# Patient Record
Sex: FEMALE | Race: WHITE | Employment: OTHER | URBAN - METROPOLITAN AREA
[De-identification: names, ages, dates, MRNs, and addresses within clinical notes are randomized per-mention and may not be internally consistent; named-entity substitution may affect disease eponyms.]

---

## 2020-10-04 ENCOUNTER — APPOINTMENT (OUTPATIENT)
Dept: GENERAL RADIOLOGY | Age: 85
DRG: 853 | End: 2020-10-04
Payer: MEDICARE

## 2020-10-04 ENCOUNTER — APPOINTMENT (OUTPATIENT)
Dept: CT IMAGING | Age: 85
DRG: 853 | End: 2020-10-04
Payer: MEDICARE

## 2020-10-04 ENCOUNTER — HOSPITAL ENCOUNTER (INPATIENT)
Age: 85
LOS: 11 days | Discharge: HOSPICE | DRG: 853 | End: 2020-10-15
Attending: EMERGENCY MEDICINE | Admitting: INTERNAL MEDICINE
Payer: MEDICARE

## 2020-10-04 PROBLEM — A41.9 SEPTIC SHOCK (HCC): Status: ACTIVE | Noted: 2020-10-04

## 2020-10-04 PROBLEM — D64.9 ANEMIA: Status: ACTIVE | Noted: 2020-10-04

## 2020-10-04 PROBLEM — R19.7 DIARRHEA: Status: ACTIVE | Noted: 2020-10-04

## 2020-10-04 PROBLEM — R65.21 SEPTIC SHOCK (HCC): Status: ACTIVE | Noted: 2020-10-04

## 2020-10-04 PROBLEM — E87.1 HYPONATREMIA: Status: ACTIVE | Noted: 2020-10-04

## 2020-10-04 PROBLEM — N39.0 UTI (URINARY TRACT INFECTION) WITH PYURIA: Status: ACTIVE | Noted: 2020-10-04

## 2020-10-04 LAB
ABO/RH: NORMAL
ALBUMIN SERPL-MCNC: 1.4 G/DL (ref 3.5–5.2)
ALP BLD-CCNC: 108 U/L (ref 35–104)
ALT SERPL-CCNC: 11 U/L (ref 0–32)
ANION GAP SERPL CALCULATED.3IONS-SCNC: 7 MMOL/L (ref 7–16)
ANISOCYTOSIS: ABNORMAL
ANTIBODY SCREEN: NORMAL
APTT: 25 SEC (ref 24.5–35.1)
AST SERPL-CCNC: 9 U/L (ref 0–31)
BACTERIA: ABNORMAL /HPF
BASOPHILS ABSOLUTE: 0 E9/L (ref 0–0.2)
BASOPHILS RELATIVE PERCENT: 0 % (ref 0–2)
BILIRUB SERPL-MCNC: 0.5 MG/DL (ref 0–1.2)
BILIRUBIN URINE: ABNORMAL
BLOOD, URINE: NEGATIVE
BUN BLDV-MCNC: 34 MG/DL (ref 8–23)
BURR CELLS: ABNORMAL
CALCIUM SERPL-MCNC: 7.3 MG/DL (ref 8.6–10.2)
CHLORIDE BLD-SCNC: 97 MMOL/L (ref 98–107)
CHP ED QC CHECK: NORMAL
CLARITY: ABNORMAL
CO2: 22 MMOL/L (ref 22–29)
COLOR: ABNORMAL
CREAT SERPL-MCNC: 1.3 MG/DL (ref 0.5–1)
CRYSTALS, UA: ABNORMAL /HPF
EOSINOPHILS ABSOLUTE: 0.21 E9/L (ref 0.05–0.5)
EOSINOPHILS RELATIVE PERCENT: 1 % (ref 0–6)
GFR AFRICAN AMERICAN: 47
GFR NON-AFRICAN AMERICAN: 39 ML/MIN/1.73
GLUCOSE BLD-MCNC: 81 MG/DL (ref 74–99)
GLUCOSE URINE: NEGATIVE MG/DL
HCT VFR BLD CALC: 29.8 % (ref 34–48)
HEMOCCULT STL QL: POSITIVE
HEMOGLOBIN: 10 G/DL (ref 11.5–15.5)
INR BLD: 1.4
KETONES, URINE: ABNORMAL MG/DL
LACTIC ACID, SEPSIS: 1.2 MMOL/L (ref 0.5–1.9)
LEUKOCYTE ESTERASE, URINE: ABNORMAL
LYMPHOCYTES ABSOLUTE: 4.01 E9/L (ref 1.5–4)
LYMPHOCYTES RELATIVE PERCENT: 19 % (ref 20–42)
MCH RBC QN AUTO: 28.3 PG (ref 26–35)
MCHC RBC AUTO-ENTMCNC: 33.6 % (ref 32–34.5)
MCV RBC AUTO: 84.4 FL (ref 80–99.9)
MONOCYTES ABSOLUTE: 1.27 E9/L (ref 0.1–0.95)
MONOCYTES RELATIVE PERCENT: 6 % (ref 2–12)
NEUTROPHILS ABSOLUTE: 15.61 E9/L (ref 1.8–7.3)
NEUTROPHILS RELATIVE PERCENT: 74 % (ref 43–80)
NITRITE, URINE: POSITIVE
OVALOCYTES: ABNORMAL
PDW BLD-RTO: 18 FL (ref 11.5–15)
PH UA: 5 (ref 5–9)
PLATELET # BLD: 280 E9/L (ref 130–450)
PMV BLD AUTO: 8.9 FL (ref 7–12)
POIKILOCYTES: ABNORMAL
POTASSIUM REFLEX MAGNESIUM: 4.3 MMOL/L (ref 3.5–5)
PROTEIN UA: ABNORMAL MG/DL
PROTHROMBIN TIME: 16.6 SEC (ref 9.3–12.4)
RBC # BLD: 3.53 E12/L (ref 3.5–5.5)
RBC UA: ABNORMAL /HPF (ref 0–2)
RENAL EPITHELIAL, UA: ABNORMAL /HPF
SARS-COV-2, NAAT: NOT DETECTED
SODIUM BLD-SCNC: 126 MMOL/L (ref 132–146)
SPECIFIC GRAVITY UA: 1.02 (ref 1–1.03)
TOTAL PROTEIN: 4.1 G/DL (ref 6.4–8.3)
TROPONIN: <0.01 NG/ML (ref 0–0.03)
UROBILINOGEN, URINE: 0.2 E.U./DL
WBC # BLD: 21.1 E9/L (ref 4.5–11.5)
WBC UA: >20 /HPF (ref 0–5)

## 2020-10-04 PROCEDURE — 71045 X-RAY EXAM CHEST 1 VIEW: CPT

## 2020-10-04 PROCEDURE — 87040 BLOOD CULTURE FOR BACTERIA: CPT

## 2020-10-04 PROCEDURE — 6360000004 HC RX CONTRAST MEDICATION: Performed by: RADIOLOGY

## 2020-10-04 PROCEDURE — 74177 CT ABD & PELVIS W/CONTRAST: CPT

## 2020-10-04 PROCEDURE — 6370000000 HC RX 637 (ALT 250 FOR IP): Performed by: EMERGENCY MEDICINE

## 2020-10-04 PROCEDURE — 36556 INSERT NON-TUNNEL CV CATH: CPT

## 2020-10-04 PROCEDURE — 2580000003 HC RX 258: Performed by: EMERGENCY MEDICINE

## 2020-10-04 PROCEDURE — 85025 COMPLETE CBC W/AUTO DIFF WBC: CPT

## 2020-10-04 PROCEDURE — P9016 RBC LEUKOCYTES REDUCED: HCPCS

## 2020-10-04 PROCEDURE — 83605 ASSAY OF LACTIC ACID: CPT

## 2020-10-04 PROCEDURE — U0002 COVID-19 LAB TEST NON-CDC: HCPCS

## 2020-10-04 PROCEDURE — 85730 THROMBOPLASTIN TIME PARTIAL: CPT

## 2020-10-04 PROCEDURE — 87088 URINE BACTERIA CULTURE: CPT

## 2020-10-04 PROCEDURE — 86900 BLOOD TYPING SEROLOGIC ABO: CPT

## 2020-10-04 PROCEDURE — 80053 COMPREHEN METABOLIC PANEL: CPT

## 2020-10-04 PROCEDURE — 2580000003 HC RX 258: Performed by: STUDENT IN AN ORGANIZED HEALTH CARE EDUCATION/TRAINING PROGRAM

## 2020-10-04 PROCEDURE — 86850 RBC ANTIBODY SCREEN: CPT

## 2020-10-04 PROCEDURE — 2500000003 HC RX 250 WO HCPCS: Performed by: STUDENT IN AN ORGANIZED HEALTH CARE EDUCATION/TRAINING PROGRAM

## 2020-10-04 PROCEDURE — C9113 INJ PANTOPRAZOLE SODIUM, VIA: HCPCS | Performed by: EMERGENCY MEDICINE

## 2020-10-04 PROCEDURE — 85610 PROTHROMBIN TIME: CPT

## 2020-10-04 PROCEDURE — 99223 1ST HOSP IP/OBS HIGH 75: CPT | Performed by: INTERNAL MEDICINE

## 2020-10-04 PROCEDURE — 99285 EMERGENCY DEPT VISIT HI MDM: CPT

## 2020-10-04 PROCEDURE — 86901 BLOOD TYPING SEROLOGIC RH(D): CPT

## 2020-10-04 PROCEDURE — 86920 COMPATIBILITY TEST SPIN: CPT

## 2020-10-04 PROCEDURE — 81001 URINALYSIS AUTO W/SCOPE: CPT

## 2020-10-04 PROCEDURE — 96365 THER/PROPH/DIAG IV INF INIT: CPT

## 2020-10-04 PROCEDURE — 6360000002 HC RX W HCPCS: Performed by: EMERGENCY MEDICINE

## 2020-10-04 PROCEDURE — 84484 ASSAY OF TROPONIN QUANT: CPT

## 2020-10-04 PROCEDURE — 2000000000 HC ICU R&B

## 2020-10-04 PROCEDURE — 36415 COLL VENOUS BLD VENIPUNCTURE: CPT

## 2020-10-04 RX ORDER — ONDANSETRON 4 MG/1
4 TABLET, FILM COATED ORAL EVERY 6 HOURS PRN
Status: DISCONTINUED | OUTPATIENT
Start: 2020-10-04 | End: 2020-10-07

## 2020-10-04 RX ORDER — LISINOPRIL 20 MG/1
40 TABLET ORAL DAILY
Status: DISCONTINUED | OUTPATIENT
Start: 2020-10-05 | End: 2020-10-05

## 2020-10-04 RX ORDER — PANTOPRAZOLE SODIUM 40 MG/10ML
80 INJECTION, POWDER, LYOPHILIZED, FOR SOLUTION INTRAVENOUS ONCE
Status: COMPLETED | OUTPATIENT
Start: 2020-10-04 | End: 2020-10-04

## 2020-10-04 RX ORDER — ACETAMINOPHEN 325 MG/1
650 TABLET ORAL EVERY 6 HOURS PRN
COMMUNITY

## 2020-10-04 RX ORDER — DICYCLOMINE HYDROCHLORIDE 10 MG/1
10 CAPSULE ORAL 3 TIMES DAILY
Status: DISCONTINUED | OUTPATIENT
Start: 2020-10-05 | End: 2020-10-05

## 2020-10-04 RX ORDER — SENNA PLUS 8.6 MG/1
1 TABLET ORAL DAILY PRN
Status: DISCONTINUED | OUTPATIENT
Start: 2020-10-04 | End: 2020-10-07

## 2020-10-04 RX ORDER — LISINOPRIL 40 MG/1
40 TABLET ORAL DAILY
COMMUNITY

## 2020-10-04 RX ORDER — AMITRIPTYLINE HYDROCHLORIDE 10 MG/1
10 TABLET, FILM COATED ORAL NIGHTLY
COMMUNITY

## 2020-10-04 RX ORDER — CALCIUM CARBONATE 200(500)MG
500 TABLET,CHEWABLE ORAL DAILY
Status: DISCONTINUED | OUTPATIENT
Start: 2020-10-05 | End: 2020-10-07

## 2020-10-04 RX ORDER — PHENOL 1.4 %
1 AEROSOL, SPRAY (ML) MUCOUS MEMBRANE DAILY
COMMUNITY

## 2020-10-04 RX ORDER — DICYCLOMINE HYDROCHLORIDE 10 MG/1
10 CAPSULE ORAL EVERY 6 HOURS PRN
COMMUNITY

## 2020-10-04 RX ORDER — CEFTRIAXONE 1 G/1
1 INJECTION, POWDER, FOR SOLUTION INTRAMUSCULAR; INTRAVENOUS EVERY 24 HOURS
COMMUNITY

## 2020-10-04 RX ORDER — FERROUS SULFATE 325(65) MG
325 TABLET ORAL
COMMUNITY

## 2020-10-04 RX ORDER — FERROUS SULFATE 325(65) MG
325 TABLET ORAL
Status: DISCONTINUED | OUTPATIENT
Start: 2020-10-05 | End: 2020-10-07

## 2020-10-04 RX ORDER — TAMSULOSIN HYDROCHLORIDE 0.4 MG/1
0.4 CAPSULE ORAL NIGHTLY
Status: DISCONTINUED | OUTPATIENT
Start: 2020-10-05 | End: 2020-10-05

## 2020-10-04 RX ORDER — SOTALOL HYDROCHLORIDE 120 MG/1
120 TABLET ORAL 2 TIMES DAILY
COMMUNITY

## 2020-10-04 RX ORDER — SODIUM CHLORIDE 9 MG/ML
INJECTION, SOLUTION INTRAVENOUS CONTINUOUS
Status: DISCONTINUED | OUTPATIENT
Start: 2020-10-05 | End: 2020-10-07

## 2020-10-04 RX ORDER — SODIUM CHLORIDE, SODIUM LACTATE, POTASSIUM CHLORIDE, AND CALCIUM CHLORIDE .6; .31; .03; .02 G/100ML; G/100ML; G/100ML; G/100ML
30 INJECTION, SOLUTION INTRAVENOUS ONCE
Status: COMPLETED | OUTPATIENT
Start: 2020-10-04 | End: 2020-10-04

## 2020-10-04 RX ORDER — LOPERAMIDE HYDROCHLORIDE 2 MG/1
2 CAPSULE ORAL EVERY 12 HOURS PRN
Status: DISCONTINUED | OUTPATIENT
Start: 2020-10-04 | End: 2020-10-05

## 2020-10-04 RX ORDER — ACETAMINOPHEN 325 MG/1
650 TABLET ORAL EVERY 6 HOURS PRN
Status: DISCONTINUED | OUTPATIENT
Start: 2020-10-04 | End: 2020-10-07

## 2020-10-04 RX ORDER — CEFTRIAXONE 1 G/1
1 INJECTION, POWDER, FOR SOLUTION INTRAMUSCULAR; INTRAVENOUS EVERY 24 HOURS
Status: DISCONTINUED | OUTPATIENT
Start: 2020-10-05 | End: 2020-10-04 | Stop reason: CLARIF

## 2020-10-04 RX ORDER — MESALAMINE 500 MG/1
500 CAPSULE, EXTENDED RELEASE ORAL 4 TIMES DAILY
COMMUNITY

## 2020-10-04 RX ORDER — ACETAMINOPHEN 650 MG/1
650 SUPPOSITORY RECTAL EVERY 6 HOURS PRN
Status: DISCONTINUED | OUTPATIENT
Start: 2020-10-04 | End: 2020-10-07

## 2020-10-04 RX ORDER — SODIUM CHLORIDE 9 MG/ML
20 INJECTION INTRAVENOUS ONCE
Status: COMPLETED | OUTPATIENT
Start: 2020-10-04 | End: 2020-10-04

## 2020-10-04 RX ORDER — AMITRIPTYLINE HYDROCHLORIDE 10 MG/1
10 TABLET, FILM COATED ORAL NIGHTLY
Status: DISCONTINUED | OUTPATIENT
Start: 2020-10-05 | End: 2020-10-07

## 2020-10-04 RX ORDER — SOTALOL HYDROCHLORIDE 120 MG/1
120 TABLET ORAL 2 TIMES DAILY
Status: DISCONTINUED | OUTPATIENT
Start: 2020-10-05 | End: 2020-10-07

## 2020-10-04 RX ORDER — LANOLIN ALCOHOL/MO/W.PET/CERES
6 CREAM (GRAM) TOPICAL DAILY
Status: DISCONTINUED | OUTPATIENT
Start: 2020-10-05 | End: 2020-10-07

## 2020-10-04 RX ORDER — SODIUM CHLORIDE 0.9 % (FLUSH) 0.9 %
10 SYRINGE (ML) INJECTION PRN
Status: DISCONTINUED | OUTPATIENT
Start: 2020-10-04 | End: 2020-10-07

## 2020-10-04 RX ORDER — TAMSULOSIN HYDROCHLORIDE 0.4 MG/1
0.4 CAPSULE ORAL NIGHTLY
COMMUNITY

## 2020-10-04 RX ORDER — SODIUM CHLORIDE 0.9 % (FLUSH) 0.9 %
10 SYRINGE (ML) INJECTION EVERY 12 HOURS SCHEDULED
Status: DISCONTINUED | OUTPATIENT
Start: 2020-10-05 | End: 2020-10-07

## 2020-10-04 RX ORDER — LANOLIN ALCOHOL/MO/W.PET/CERES
6 CREAM (GRAM) TOPICAL DAILY
COMMUNITY

## 2020-10-04 RX ORDER — LOPERAMIDE HYDROCHLORIDE 2 MG/1
2 CAPSULE ORAL EVERY 12 HOURS PRN
COMMUNITY

## 2020-10-04 RX ORDER — ONDANSETRON 4 MG/1
4 TABLET, FILM COATED ORAL EVERY 6 HOURS PRN
COMMUNITY

## 2020-10-04 RX ADMIN — PIPERACILLIN SODIUM AND TAZOBACTAM SODIUM 3.38 G: 3; .375 INJECTION, POWDER, LYOPHILIZED, FOR SOLUTION INTRAVENOUS at 17:37

## 2020-10-04 RX ADMIN — SODIUM CHLORIDE, POTASSIUM CHLORIDE, SODIUM LACTATE AND CALCIUM CHLORIDE 1779 ML: 600; 310; 30; 20 INJECTION, SOLUTION INTRAVENOUS at 17:47

## 2020-10-04 RX ADMIN — DEXTROSE MONOHYDRATE 5 MCG/MIN: 50 INJECTION, SOLUTION INTRAVENOUS at 20:19

## 2020-10-04 RX ADMIN — SODIUM CHLORIDE 20 ML: 9 INJECTION, SOLUTION INTRAMUSCULAR; INTRAVENOUS; SUBCUTANEOUS at 17:37

## 2020-10-04 RX ADMIN — Medication 125 MG: at 21:22

## 2020-10-04 RX ADMIN — IOPAMIDOL 80 ML: 755 INJECTION, SOLUTION INTRAVENOUS at 19:53

## 2020-10-04 RX ADMIN — PANTOPRAZOLE SODIUM 80 MG: 40 INJECTION, POWDER, FOR SOLUTION INTRAVENOUS at 17:36

## 2020-10-04 SDOH — HEALTH STABILITY: MENTAL HEALTH: HOW OFTEN DO YOU HAVE A DRINK CONTAINING ALCOHOL?: NEVER

## 2020-10-04 ASSESSMENT — ENCOUNTER SYMPTOMS
VOMITING: 1
ABDOMINAL PAIN: 1
NAUSEA: 1
BACK PAIN: 0
COUGH: 0
SHORTNESS OF BREATH: 0
COLOR CHANGE: 0
BLOOD IN STOOL: 1
RHINORRHEA: 0
DIARRHEA: 1

## 2020-10-04 ASSESSMENT — PAIN SCALES - GENERAL: PAINLEVEL_OUTOF10: 5

## 2020-10-04 ASSESSMENT — PAIN DESCRIPTION - LOCATION: LOCATION: ABDOMEN

## 2020-10-04 ASSESSMENT — PAIN DESCRIPTION - PAIN TYPE: TYPE: ACUTE PAIN

## 2020-10-04 NOTE — ED PROVIDER NOTES
ED PROVIDER NOTE    Chief Complaint   Patient presents with    Hypotension    Diarrhea       HPI:  10/4/20,   Time: 4:33 PM EDT       Jocelin Allan is a 82 y.o. female presenting to the ED for hypotension and diarrhea. Sent from [facility]. Hypotension ongoing since yesterday, 10E systolic on scene, improved to 100s after 1L given by EMS en route. Diarrhea ongoing intermittently for several weeks. Associated dark stools, occasionally w/ red blood, nausea, vomiting, decreased PO intake. Being treated for UTI w/ ceftriaxone, has indwelling caraballo. Recent admission at [facility]. Also hx of afib, no longer on warfarin. Diffuse abdominal cramping pain, intermittent, nonradiating, no aggravating/alleviating factors. No fever, chills, chest pain, cough, shortness of breath. Chart review: hx afib, HTN, GI bleed    Review of Systems:     Review of Systems   Constitutional: Positive for appetite change and fatigue. Negative for chills and fever. HENT: Negative for congestion and rhinorrhea. Eyes: Negative for visual disturbance. Respiratory: Negative for cough and shortness of breath. Cardiovascular: Negative for chest pain. Gastrointestinal: Positive for abdominal pain, blood in stool, diarrhea, nausea and vomiting. Genitourinary: Negative for decreased urine volume, difficulty urinating, dysuria, flank pain, frequency, hematuria and urgency. Musculoskeletal: Negative for back pain and neck pain. Skin: Negative for color change. Neurological: Negative for dizziness, syncope, weakness, light-headedness, numbness and headaches.       --------------------------------------------- PAST HISTORY ---------------------------------------------  Past Medical History:   Past Medical History:   Diagnosis Date    Anemia     Atrial fibrillation ([address]     Colitis     Hypertension        Past Surgical History:   History reviewed. No pertinent surgical history.     Social History:   Social History Pupils: Pupils are equal, round, and reactive to light. Neck:      Musculoskeletal: Normal range of motion and neck supple. Cardiovascular:      Rate and Rhythm: Normal rate and regular rhythm. Pulses: Normal pulses. Heart sounds: Normal heart sounds. No murmur. Pulmonary:      Effort: Pulmonary effort is normal. No respiratory distress. Breath sounds: Normal breath sounds. No wheezing or rales. Abdominal:      General: There is no distension. Palpations: Abdomen is soft. Tenderness: There is abdominal tenderness (mild diffuse nonfocal). There is right CVA tenderness. There is no left CVA tenderness, guarding or rebound. Genitourinary:     Rectum: Guaiac result positive. Comments: Dark brown stool, guaiac positive  Musculoskeletal: Normal range of motion. General: No swelling or tenderness. Skin:     General: Skin is warm and dry. Neurological:      Mental Status: She is alert and oriented to person, place, and time. Comments: Moves all extremities with appropriate strength. Sensation grossly intact in all extremities. -------------------------------------------------- RESULTS -------------------------------------------------  I have personally reviewed all laboratory and imaging results for this patient. Results are listed below.      LABS:  Labs Reviewed   CBC WITH AUTO DIFFERENTIAL - Abnormal; Notable for the following components:       Result Value    WBC 21.1 (*)     Hemoglobin 10.0 (*)     Hematocrit 29.8 (*)     RDW 18.0 (*)     Lymphocytes % 19.0 (*)     Neutrophils Absolute 15.61 (*)     Lymphocytes Absolute 4.01 (*)     Monocytes Absolute 1.27 (*)     All other components within normal limits   COMPREHENSIVE METABOLIC PANEL W/ REFLEX TO MG FOR LOW K - Abnormal; Notable for the following components:    Sodium 126 (*)     Chloride 97 (*)     BUN 34 (*)     CREATININE 1.3 (*)     Calcium 7.3 (*)     Total Protein 4.1 (*)     Alb 1.4 (*) Alkaline Phosphatase 108 (*)     All other components within normal limits   URINALYSIS - Abnormal; Notable for the following components:    Color, UA CASEY (*)     Clarity, UA TURBID (*)     Bilirubin Urine MODERATE (*)     Ketones, Urine TRACE (*)     Nitrite, Urine POSITIVE (*)     Leukocyte Esterase, Urine MODERATE (*)     All other components within normal limits   PROTIME-INR - Abnormal; Notable for the following components:    Protime 16.6 (*)     All other components within normal limits   MICROSCOPIC URINALYSIS - Abnormal; Notable for the following components:    WBC, UA >20 (*)     Bacteria, UA FEW (*)     Crystals, UA Few (*)     All other components within normal limits   POCT OCCULT BLOOD STOOL COLON CA SCREEN 1-3 - Normal   CULTURE, URINE   CULTURE, BLOOD 1   CULTURE, BLOOD 2   CULTURE, STOOL   CLOSTRIDIUM DIFFICILE EIA   CULTURE, MRSA, SCREENING   LACTATE, SEPSIS   COVID-19   APTT   TROPONIN   HEMOGLOBIN AND HEMATOCRIT, BLOOD   TYPE AND SCREEN     Leukocytosis  UA c/w infection in setting of caraballo cath, cx sent and pending  Anemia, Hb 10.0 stable from 10.1 on 9/11/20 (via care everywhere)  QI 0.6 on 9/11/20 > 1.3    RADIOLOGY:  Interpreted personally and by Radiologist.  CT ABDOMEN PELVIS W IV CONTRAST Additional Contrast? None   Final Result   Addendum 1 of 1   ADDENDUM:   In the title of the examination, disregard the CT abdomen and pelvis. Final      CT ABDOMEN PELVIS W IV CONTRAST Additional Contrast? None   Final Result   Diffuse inflammation of the transverse colon, descending and rectosigmoid   colon with wall thickening and edema with active contrast   extravasation/bleeding. Hemorrhagic diverticulitis or colitis are   considered. There is some edema in the presacral area and tiny amount of air   collection in the rectovesical pouch. Walled-off microperforation is   considered. Distended gallbladder. Atelectasis/pleural effusion in the left lung base. RECOMMENDATIONS:   The clinical service was notified         XR CHEST PORTABLE   Final Result   Addendum 1 of 1   ADDENDUM:   In the title of the examination, disregard the CT abdomen and pelvis. Final      XR CHEST 1 VIEW   Final Result   Suspect left pleural effusion. No airspace consolidation. Dual-chamber   pacemaker in place. Follow-up PA and lateral radiographs may be helpful in   further evaluation.               ------------------------- NURSING NOTES AND VITALS REVIEWED ---------------------------   The nursing notes within the ED encounter and vital signs as below have been reviewed by myself. BP (!) 110/52   Pulse 78   Temp 98.9 °F (37.2 °C) (Oral)   Resp 18   Ht 5' 6\" (1.676 m)   Wt 223 lb 3 oz (101.2 kg)   SpO2 95%   BMI 36.02 kg/m²   Oxygen Saturation Interpretation: Normal    The patients available past medical records and past encounters were reviewed.         ------------------------------ ED COURSE/MEDICAL DECISION MAKING----------------------  Medications   norepinephrine (LEVOPHED) 16 mg in dextrose 5 % 250 mL infusion (9 mcg/min Intravenous Rate/Dose Change 10/4/20 2107)   vancomycin (VANCOCIN) oral solution 125 mg (125 mg Oral Given 10/4/20 2122)   piperacillin-tazobactam (ZOSYN) 3.375 g in dextrose 5 % 50 mL IVPB (mini-bag) (0 g Intravenous Stopped 10/4/20 1809)   lactated ringers bolus (0 mL/kg × 59.3 kg (Ideal) Intravenous Stopped 10/4/20 1917)   pantoprazole (PROTONIX) injection 80 mg (80 mg Intravenous Given 10/4/20 1736)     And   sodium chloride (PF) 0.9 % injection 20 mL (20 mLs Intravenous Given 10/4/20 1737)   iopamidol (ISOVUE-370) 76 % injection 80 mL (80 mLs Intravenous Given 10/4/20 1953)     Consultations:             General surgery - Dr Lewis Exon care - Dr Maura Pierre: Please note that the withdrawal or failure to initiate urgent interventions for this patient would likely result in a life threatening deterioration or permanent disability. Accordingly this patient received 45 minutes of critical care time, excluding separately billable procedures. Counseling: The emergency provider has spoken with the patient and discussed todays results, in addition to providing specific details for the plan of care and counseling regarding the diagnosis and prognosis. Questions are answered at this time and they are agreeable with the plan. ED Course/Medical Decision Makin y.o. female here with hypotension from SNF. In setting of recent treatment for UTI at OSH. Associated abdominal cramping and diarrhea. Profuse watery diarrhea, dark brown, guaiac positive. No bright red blood per rectum. Hypotensive despite receiving 30 cc/kg crystalloid bolus. Right IJ central line placed and norepinephrine started with subsequent improvement in blood pressure. Labs and imaging as above, notable for leukocytosis, QI, hemoglobin stable from prior baseline value, hemorrhagic diverticulitis versus colitis. Discussed with general surgery and critical care, agreed with plan for admission to ICU, IV antibiotics, vasopressors. Confirmed patient's CODE STATUS with patient and family, she states that she does desire to have intervention to treat her current issues including vasopressors and IV antibiotics, however she does not want chest compressions or intubation. Admitted to ICU for further evaluation and management.       --------------------------------- IMPRESSION AND DISPOSITION ---------------------------------    IMPRESSION  1. Septic shock ([address]    2. UGIB (upper gastrointestinal bleed)        DISPOSITION  Disposition: Admit to CCU/ICU  Patient condition is critical    NOTE: This report was transcribed using voice recognition software.  Every effort was made to ensure accuracy; however, inadvertent computerized transcription errors may be present    Nicolasa Ibanez MD  Attending Emergency Physician         Nicolasa Ibanez MD  10/04/20 3206

## 2020-10-04 NOTE — ED NOTES
Central Line Placement Procedure Note    Indication: vascular access, poor peripheral access, hypovolemia and centrally administered medications    Consent: The patient provided verbal consent for this procedure. Procedure: The patient was positioned appropriately and the skin over the right internal jugular vein was prepped with Chloraprep and draped in sterile fashion. Local anesthesia was obtained by infiltration using 5cc 1% Lidocaine without epinephrine. Ultrasound and a large bore needle was used to identify the vein. A guide wire was then inserted into the vein through the needle. A triple lumen catheter was then inserted into the vessel over the guide wire using the Seldinger technique. All ports showed good, free flowing blood return and were flushed with saline solution. The catheter was then securely fastened to the skin with suture at 4cm from insertion in skin. Two sutures were placed into the proximal eyelets. An antibiotic disk was placed and the site was then covered with a sterile dressing. A post procedure X-ray was ordered and is still pending at this time. The patient tolerated the procedure well.     Complications: None         [address] DO  Resident  10/04/20 7386

## 2020-10-05 ENCOUNTER — APPOINTMENT (OUTPATIENT)
Dept: ULTRASOUND IMAGING | Age: 85
DRG: 853 | End: 2020-10-05
Payer: MEDICARE

## 2020-10-05 LAB
ALBUMIN SERPL-MCNC: 1.9 G/DL (ref 3.5–5.2)
ALP BLD-CCNC: 126 U/L (ref 35–104)
ALT SERPL-CCNC: 10 U/L (ref 0–32)
ANION GAP SERPL CALCULATED.3IONS-SCNC: 12 MMOL/L (ref 7–16)
ANION GAP SERPL CALCULATED.3IONS-SCNC: 7 MMOL/L (ref 7–16)
ANION GAP SERPL CALCULATED.3IONS-SCNC: 9 MMOL/L (ref 7–16)
ANISOCYTOSIS: ABNORMAL
AST SERPL-CCNC: 11 U/L (ref 0–31)
BASOPHILS ABSOLUTE: 0.11 E9/L (ref 0–0.2)
BASOPHILS RELATIVE PERCENT: 0.3 % (ref 0–2)
BILIRUB SERPL-MCNC: 0.6 MG/DL (ref 0–1.2)
BILIRUBIN DIRECT: 0.4 MG/DL (ref 0–0.3)
BILIRUBIN, INDIRECT: 0.2 MG/DL (ref 0–1)
BUN BLDV-MCNC: 38 MG/DL (ref 8–23)
BUN BLDV-MCNC: 39 MG/DL (ref 8–23)
BUN BLDV-MCNC: 40 MG/DL (ref 8–23)
BURR CELLS: ABNORMAL
C-REACTIVE PROTEIN: 17.5 MG/DL (ref 0–0.4)
CALCIUM SERPL-MCNC: 7 MG/DL (ref 8.6–10.2)
CALCIUM SERPL-MCNC: 7.1 MG/DL (ref 8.6–10.2)
CALCIUM SERPL-MCNC: 7.2 MG/DL (ref 8.6–10.2)
CHLORIDE BLD-SCNC: 94 MMOL/L (ref 98–107)
CHLORIDE BLD-SCNC: 96 MMOL/L (ref 98–107)
CHLORIDE BLD-SCNC: 97 MMOL/L (ref 98–107)
CHLORIDE URINE RANDOM: <20 MMOL/L
CO2: 18 MMOL/L (ref 22–29)
CO2: 19 MMOL/L (ref 22–29)
CO2: 21 MMOL/L (ref 22–29)
CORTISOL TOTAL: 94.8 MCG/DL (ref 2.68–18.4)
CREAT SERPL-MCNC: 1.5 MG/DL (ref 0.5–1)
CREAT SERPL-MCNC: 1.6 MG/DL (ref 0.5–1)
CREAT SERPL-MCNC: 1.6 MG/DL (ref 0.5–1)
CREATININE URINE: 71 MG/DL (ref 29–226)
EOSINOPHILS ABSOLUTE: 0.02 E9/L (ref 0.05–0.5)
EOSINOPHILS RELATIVE PERCENT: 0.1 % (ref 0–6)
GFR AFRICAN AMERICAN: 37
GFR AFRICAN AMERICAN: 37
GFR AFRICAN AMERICAN: 40
GFR NON-AFRICAN AMERICAN: 31 ML/MIN/1.73
GFR NON-AFRICAN AMERICAN: 31 ML/MIN/1.73
GFR NON-AFRICAN AMERICAN: 33 ML/MIN/1.73
GLUCOSE BLD-MCNC: 106 MG/DL (ref 74–99)
GLUCOSE BLD-MCNC: 90 MG/DL (ref 74–99)
GLUCOSE BLD-MCNC: 95 MG/DL (ref 74–99)
HCT VFR BLD CALC: 31.3 % (ref 34–48)
HEMOGLOBIN: 10.1 G/DL (ref 11.5–15.5)
IMMATURE GRANULOCYTES #: 1.2 E9/L
IMMATURE GRANULOCYTES %: 3.7 % (ref 0–5)
LACTIC ACID: 1.3 MMOL/L (ref 0.5–2.2)
LYMPHOCYTES ABSOLUTE: 3.1 E9/L (ref 1.5–4)
LYMPHOCYTES RELATIVE PERCENT: 9.5 % (ref 20–42)
MAGNESIUM: 1.7 MG/DL (ref 1.6–2.6)
MCH RBC QN AUTO: 27.1 PG (ref 26–35)
MCHC RBC AUTO-ENTMCNC: 32.3 % (ref 32–34.5)
MCV RBC AUTO: 83.9 FL (ref 80–99.9)
MONOCYTES ABSOLUTE: 1.47 E9/L (ref 0.1–0.95)
MONOCYTES RELATIVE PERCENT: 4.5 % (ref 2–12)
NEUTROPHILS ABSOLUTE: 26.74 E9/L (ref 1.8–7.3)
NEUTROPHILS RELATIVE PERCENT: 81.9 % (ref 43–80)
OSMOLALITY URINE: 304 MOSM/KG (ref 300–900)
OSMOLALITY: 271 MOSM/KG (ref 285–310)
OVALOCYTES: ABNORMAL
PDW BLD-RTO: 18.6 FL (ref 11.5–15)
PHOSPHORUS: 4.6 MG/DL (ref 2.5–4.5)
PLATELET # BLD: 364 E9/L (ref 130–450)
PMV BLD AUTO: 8.8 FL (ref 7–12)
POIKILOCYTES: ABNORMAL
POLYCHROMASIA: ABNORMAL
POTASSIUM REFLEX MAGNESIUM: 4.3 MMOL/L (ref 3.5–5)
POTASSIUM REFLEX MAGNESIUM: 4.3 MMOL/L (ref 3.5–5)
POTASSIUM REFLEX MAGNESIUM: 4.4 MMOL/L (ref 3.5–5)
POTASSIUM, UR: 38.9 MMOL/L
PROCALCITONIN: 30.06 NG/ML (ref 0–0.08)
RBC # BLD: 3.73 E12/L (ref 3.5–5.5)
SEDIMENTATION RATE, ERYTHROCYTE: 10 MM/HR (ref 0–20)
SODIUM BLD-SCNC: 122 MMOL/L (ref 132–146)
SODIUM BLD-SCNC: 124 MMOL/L (ref 132–146)
SODIUM BLD-SCNC: 127 MMOL/L (ref 132–146)
SODIUM URINE: <20 MMOL/L
TEAR DROP CELLS: ABNORMAL
TOTAL CK: 16 U/L (ref 20–180)
TOTAL CK: 29 U/L (ref 20–180)
TOTAL PROTEIN: 4.2 G/DL (ref 6.4–8.3)
TOXIC GRANULATION: ABNORMAL
UREA NITROGEN, UR: 129 MG/DL (ref 800–1666)
WBC # BLD: 32.6 E9/L (ref 4.5–11.5)

## 2020-10-05 PROCEDURE — 85025 COMPLETE CBC W/AUTO DIFF WBC: CPT

## 2020-10-05 PROCEDURE — 83605 ASSAY OF LACTIC ACID: CPT

## 2020-10-05 PROCEDURE — 99233 SBSQ HOSP IP/OBS HIGH 50: CPT | Performed by: INTERNAL MEDICINE

## 2020-10-05 PROCEDURE — 76705 ECHO EXAM OF ABDOMEN: CPT

## 2020-10-05 PROCEDURE — 87081 CULTURE SCREEN ONLY: CPT

## 2020-10-05 PROCEDURE — 80076 HEPATIC FUNCTION PANEL: CPT

## 2020-10-05 PROCEDURE — 83935 ASSAY OF URINE OSMOLALITY: CPT

## 2020-10-05 PROCEDURE — 84100 ASSAY OF PHOSPHORUS: CPT

## 2020-10-05 PROCEDURE — 6360000002 HC RX W HCPCS

## 2020-10-05 PROCEDURE — 82436 ASSAY OF URINE CHLORIDE: CPT

## 2020-10-05 PROCEDURE — 82550 ASSAY OF CK (CPK): CPT

## 2020-10-05 PROCEDURE — 93005 ELECTROCARDIOGRAM TRACING: CPT | Performed by: STUDENT IN AN ORGANIZED HEALTH CARE EDUCATION/TRAINING PROGRAM

## 2020-10-05 PROCEDURE — 36592 COLLECT BLOOD FROM PICC: CPT

## 2020-10-05 PROCEDURE — 2580000003 HC RX 258: Performed by: INTERNAL MEDICINE

## 2020-10-05 PROCEDURE — 6360000002 HC RX W HCPCS: Performed by: INTERNAL MEDICINE

## 2020-10-05 PROCEDURE — 86140 C-REACTIVE PROTEIN: CPT

## 2020-10-05 PROCEDURE — 80048 BASIC METABOLIC PNL TOTAL CA: CPT

## 2020-10-05 PROCEDURE — 2000000000 HC ICU R&B

## 2020-10-05 PROCEDURE — 82570 ASSAY OF URINE CREATININE: CPT

## 2020-10-05 PROCEDURE — 84540 ASSAY OF URINE/UREA-N: CPT

## 2020-10-05 PROCEDURE — 92610 EVALUATE SWALLOWING FUNCTION: CPT | Performed by: SPEECH-LANGUAGE PATHOLOGIST

## 2020-10-05 PROCEDURE — 36415 COLL VENOUS BLD VENIPUNCTURE: CPT

## 2020-10-05 PROCEDURE — 2580000003 HC RX 258

## 2020-10-05 PROCEDURE — 6360000002 HC RX W HCPCS: Performed by: SPECIALIST

## 2020-10-05 PROCEDURE — 99222 1ST HOSP IP/OBS MODERATE 55: CPT | Performed by: SURGERY

## 2020-10-05 PROCEDURE — 92526 ORAL FUNCTION THERAPY: CPT | Performed by: SPEECH-LANGUAGE PATHOLOGIST

## 2020-10-05 PROCEDURE — 84145 PROCALCITONIN (PCT): CPT

## 2020-10-05 PROCEDURE — 6370000000 HC RX 637 (ALT 250 FOR IP): Performed by: INTERNAL MEDICINE

## 2020-10-05 PROCEDURE — 85651 RBC SED RATE NONAUTOMATED: CPT

## 2020-10-05 PROCEDURE — 6360000002 HC RX W HCPCS: Performed by: FAMILY MEDICINE

## 2020-10-05 PROCEDURE — 84300 ASSAY OF URINE SODIUM: CPT

## 2020-10-05 PROCEDURE — 83735 ASSAY OF MAGNESIUM: CPT

## 2020-10-05 PROCEDURE — P9047 ALBUMIN (HUMAN), 25%, 50ML: HCPCS | Performed by: INTERNAL MEDICINE

## 2020-10-05 PROCEDURE — C9113 INJ PANTOPRAZOLE SODIUM, VIA: HCPCS | Performed by: FAMILY MEDICINE

## 2020-10-05 PROCEDURE — 82533 TOTAL CORTISOL: CPT

## 2020-10-05 PROCEDURE — 2580000003 HC RX 258: Performed by: SPECIALIST

## 2020-10-05 PROCEDURE — 83930 ASSAY OF BLOOD OSMOLALITY: CPT

## 2020-10-05 PROCEDURE — 84133 ASSAY OF URINE POTASSIUM: CPT

## 2020-10-05 RX ORDER — DICYCLOMINE HYDROCHLORIDE 10 MG/1
10 CAPSULE ORAL 2 TIMES DAILY
Status: DISCONTINUED | OUTPATIENT
Start: 2020-10-05 | End: 2020-10-05

## 2020-10-05 RX ORDER — ALBUMIN (HUMAN) 12.5 G/50ML
25 SOLUTION INTRAVENOUS EVERY 6 HOURS
Status: COMPLETED | OUTPATIENT
Start: 2020-10-05 | End: 2020-10-07

## 2020-10-05 RX ORDER — MIDODRINE HYDROCHLORIDE 5 MG/1
5 TABLET ORAL
Status: DISCONTINUED | OUTPATIENT
Start: 2020-10-05 | End: 2020-10-05

## 2020-10-05 RX ORDER — 0.9 % SODIUM CHLORIDE 0.9 %
1000 INTRAVENOUS SOLUTION INTRAVENOUS ONCE
Status: COMPLETED | OUTPATIENT
Start: 2020-10-05 | End: 2020-10-05

## 2020-10-05 RX ORDER — PANTOPRAZOLE SODIUM 40 MG/10ML
40 INJECTION, POWDER, LYOPHILIZED, FOR SOLUTION INTRAVENOUS DAILY
Status: DISCONTINUED | OUTPATIENT
Start: 2020-10-05 | End: 2020-10-09

## 2020-10-05 RX ORDER — MAGNESIUM SULFATE IN WATER 40 MG/ML
2 INJECTION, SOLUTION INTRAVENOUS ONCE
Status: COMPLETED | OUTPATIENT
Start: 2020-10-05 | End: 2020-10-05

## 2020-10-05 RX ADMIN — AMITRIPTYLINE HYDROCHLORIDE 10 MG: 10 TABLET, FILM COATED ORAL at 20:18

## 2020-10-05 RX ADMIN — WATER 10 ML: 1 INJECTION INTRAMUSCULAR; INTRAVENOUS; SUBCUTANEOUS at 11:52

## 2020-10-05 RX ADMIN — ALBUMIN (HUMAN) 25 G: 0.25 INJECTION, SOLUTION INTRAVENOUS at 20:18

## 2020-10-05 RX ADMIN — HYDROCORTISONE SODIUM SUCCINATE 100 MG: 100 INJECTION, POWDER, FOR SOLUTION INTRAMUSCULAR; INTRAVENOUS at 11:56

## 2020-10-05 RX ADMIN — AMITRIPTYLINE HYDROCHLORIDE 10 MG: 10 TABLET, FILM COATED ORAL at 00:56

## 2020-10-05 RX ADMIN — SODIUM CHLORIDE: 9 INJECTION, SOLUTION INTRAVENOUS at 00:55

## 2020-10-05 RX ADMIN — Medication 125 MG: at 01:04

## 2020-10-05 RX ADMIN — MESALAMINE 500 MG: 250 CAPSULE ORAL at 17:41

## 2020-10-05 RX ADMIN — CALCIUM CARBONATE 500 MG: 500 TABLET, CHEWABLE ORAL at 11:54

## 2020-10-05 RX ADMIN — SODIUM CHLORIDE 1000 ML: 9 INJECTION, SOLUTION INTRAVENOUS at 11:49

## 2020-10-05 RX ADMIN — ENOXAPARIN SODIUM 30 MG: 30 INJECTION SUBCUTANEOUS at 12:02

## 2020-10-05 RX ADMIN — SODIUM CHLORIDE, PRESERVATIVE FREE 10 ML: 5 INJECTION INTRAVENOUS at 08:17

## 2020-10-05 RX ADMIN — ALBUMIN (HUMAN) 25 G: 0.25 INJECTION, SOLUTION INTRAVENOUS at 15:11

## 2020-10-05 RX ADMIN — PANTOPRAZOLE SODIUM 40 MG: 40 INJECTION, POWDER, FOR SOLUTION INTRAVENOUS at 08:17

## 2020-10-05 RX ADMIN — SOTALOL HYDROCHLORIDE 120 MG: 120 TABLET ORAL at 20:19

## 2020-10-05 RX ADMIN — MESALAMINE 500 MG: 250 CAPSULE ORAL at 13:00

## 2020-10-05 RX ADMIN — Medication 125 MG: at 05:48

## 2020-10-05 RX ADMIN — SODIUM CHLORIDE, PRESERVATIVE FREE 10 ML: 5 INJECTION INTRAVENOUS at 20:18

## 2020-10-05 RX ADMIN — HYDROCORTISONE SODIUM SUCCINATE 100 MG: 100 INJECTION, POWDER, FOR SOLUTION INTRAMUSCULAR; INTRAVENOUS at 20:18

## 2020-10-05 RX ADMIN — CEFTRIAXONE 1 G: 1 INJECTION, POWDER, FOR SOLUTION INTRAMUSCULAR; INTRAVENOUS at 00:57

## 2020-10-05 RX ADMIN — DICYCLOMINE HYDROCHLORIDE 10 MG: 10 CAPSULE ORAL at 00:56

## 2020-10-05 RX ADMIN — TAMSULOSIN HYDROCHLORIDE 0.4 MG: 0.4 CAPSULE ORAL at 00:56

## 2020-10-05 RX ADMIN — MAGNESIUM SULFATE 2 G: 2 INJECTION INTRAVENOUS at 08:17

## 2020-10-05 RX ADMIN — SODIUM CHLORIDE: 9 INJECTION, SOLUTION INTRAVENOUS at 23:18

## 2020-10-05 RX ADMIN — MESALAMINE 500 MG: 250 CAPSULE ORAL at 20:19

## 2020-10-05 RX ADMIN — CEFEPIME 2 G: 2 INJECTION, POWDER, FOR SOLUTION INTRAVENOUS at 11:17

## 2020-10-05 RX ADMIN — MESALAMINE 500 MG: 250 CAPSULE ORAL at 00:57

## 2020-10-05 ASSESSMENT — ENCOUNTER SYMPTOMS
VOMITING: 0
SHORTNESS OF BREATH: 0
CONSTIPATION: 0
ABDOMINAL PAIN: 1
DIARRHEA: 0
COUGH: 0
NAUSEA: 0

## 2020-10-05 ASSESSMENT — PAIN SCALES - GENERAL
PAINLEVEL_OUTOF10: 1
PAINLEVEL_OUTOF10: 0

## 2020-10-05 NOTE — PROGRESS NOTES
.Patient admitted from ER room 25 to ICU room 217, with the following belongings eye glasses, hearing aids in both ears, dentures top and bottoms, and neck pillow, placed on monitor, patient oriented to room and unit visiting hours. Patient guide at bedside, reviewed patient rights and responsibilities. MRSA nasal swab obtained. Bed alarm on. Call light within reach.

## 2020-10-05 NOTE — H&P
[facility] History and Physical      CHIEF COMPLAINT: Diarrhea    History of Present Illness: This is a 79-year-old female who is a chronic nursing home resident who has had a urinary tract infection and has developed diarrhea while on antibiotics for the urinary tract infection she was found to be hypotensive at the [facility] care facility with marked leukocytosis. White count on admission running 1100. Pyuria is noted on her urinalysis. Leukocyte esterase is positive. Consideration that she may have C. difficile secondary to her antibiotics is present she was started on vancomycin in addition to continuing her Rocephin for her urinary tract infection. Informant(s) for H&P patient, daughter    REVIEW OF SYSTEMS:  A comprehensive review of systems was negative except for: what is in the HPI  12 point review of systems was performed and was not further contributory she complains of feeling fairly well except very tired. She denies having any fever prior to coming to the hospital.    PMH:  Past Medical History:   Diagnosis Date    Anemia     Atrial fibrillation ([address]     Colitis     Hypertension        Surgical History:  History reviewed. No pertinent surgical history. Medications Prior to Admission:    Prior to Admission medications    Medication Sig Start Date End Date Taking?  Authorizing Provider   acetaminophen (TYLENOL) 325 MG tablet Take 650 mg by mouth every 6 hours as needed for Pain   Yes Historical Provider, MD   amitriptyline (ELAVIL) 10 MG tablet Take 10 mg by mouth nightly   Yes Historical Provider, MD   dicyclomine (BENTYL) 10 MG capsule Take 10 mg by mouth every 6 hours as needed   Yes Historical Provider, MD   calcium carbonate 600 MG TABS tablet Take 1 tablet by mouth daily   Yes Historical Provider, MD   ferrous sulfate (IRON 325) 325 (65 Fe) MG tablet Take 325 mg by mouth daily (with breakfast)   Yes Historical Provider, MD   lisinopril (PRINIVIL;ZESTRIL) 40 MG tablet Take 40 mg by mouth daily   Yes Historical Provider, MD   melatonin 3 MG TABS tablet Take 6 mg by mouth daily   Yes Historical Provider, MD   tamsulosin (FLOMAX) 0.4 MG capsule Take 0.4 mg by mouth nightly    Historical Provider, MD   loperamide (IMODIUM) 2 MG capsule Take 2 mg by mouth every 12 hours as needed for Diarrhea    Historical Provider, MD   mesalamine (PENTASA) 500 MG extended release capsule Take 500 mg by mouth 4 times daily    Historical Provider, MD   cefTRIAXone (ROCEPHIN) 1 g injection Infuse 1 g intravenously every 24 hours    Historical Provider, MD   sotalol (BETAPACE) 120 MG tablet Take 120 mg by mouth 2 times daily    Historical Provider, MD   ondansetron (ZOFRAN) 4 MG tablet Take 4 mg by mouth every 6 hours as needed for Nausea or Vomiting    Historical Provider, MD       Allergies:    Codeine    Social History:    reports that she has never smoked. She has never used smokeless tobacco. She reports that she does not drink alcohol or use drugs. Family History:   family history is not on file. No further family history available      PHYSICAL EXAM:  Vitals:  BP (!) 89/52   Pulse 78   Temp 97.4 °F (36.3 °C) (Oral)   Resp 18   Ht 5' 6\" (1.676 m)   Wt 223 lb 3 oz (101.2 kg)   SpO2 97%   BMI 36.02 kg/m²     General Appearance: alert and oriented to person, place and time and in no acute distress.   Mildly pale  Skin: warm and dry  Head: normocephalic and atraumatic  Eyes: pupils equal, round, and reactive to light, extraocular eye movements intact, conjunctivae normal  Neck: neck supple and non tender without mass   Pulmonary/Chest: clear to auscultation bilaterally- no wheezes, rales or rhonchi, normal air movement, no respiratory distress  Cardiovascular: normal rate, normal S1 and S2 and no carotid bruits  Abdomen: soft, tender in the suprapubic and right lower quadrant areas, non-distended, normal bowel sounds, no masses or organomegaly  Extremities: no cyanosis, no clubbing and no edema, feet are cool to the touch. Dorsalis pedis and posterior tibials are present but weak  Neurologic: no cranial nerve deficit and speech normal        LABS:  Recent Labs     10/04/20  1708   *   K 4.3   CL 97*   CO2 22   BUN 34*   CREATININE 1.3*   GLUCOSE 81   CALCIUM 7.3*       Recent Labs     10/04/20  1708   WBC 21.1*   RBC 3.53   HGB 10.0*   HCT 29.8*   MCV 84.4   MCH 28.3   MCHC 33.6   RDW 18.0*      MPV 8.9       No results for input(s): POCGLU in the last 72 hours. Radiology:   CT ABDOMEN PELVIS W IV CONTRAST Additional Contrast? None   Final Result   Addendum 1 of 1   ADDENDUM:   In the title of the examination, disregard the CT abdomen and pelvis. Final      CT ABDOMEN PELVIS W IV CONTRAST Additional Contrast? None   Final Result   Diffuse inflammation of the transverse colon, descending and rectosigmoid   colon with wall thickening and edema with active contrast   extravasation/bleeding. Hemorrhagic diverticulitis or colitis are   considered. There is some edema in the presacral area and tiny amount of air   collection in the rectovesical pouch. Walled-off microperforation is   considered. Distended gallbladder. Atelectasis/pleural effusion in the left lung base. RECOMMENDATIONS:   The clinical service was notified         XR CHEST PORTABLE   Final Result   Addendum 1 of 1   ADDENDUM:   In the title of the examination, disregard the CT abdomen and pelvis. Final      XR CHEST 1 VIEW   Final Result   Suspect left pleural effusion. No airspace consolidation. Dual-chamber   pacemaker in place. Follow-up PA and lateral radiographs may be helpful in   further evaluation. ASSESSMENT:      Active Problems:    Septic shock (HCC)    Hyponatremia    Diarrhea    UTI (urinary tract infection) with pyuria    Anemia  Resolved Problems:    * No resolved hospital problems. *      PLAN:      1.   Septic shock (HCC)-she has received fluid bolus in the emergency room and is been started on Levophed she will be going to the ICU. She has normal lactic acid pressor support with Levophed will be continued until her blood pressure improves. 2.  Hyponatremia serum sodium is 126 mg percent. IV fluids will be given that contain normal saline. 3.  Diarrhea-history of diarrhea for the last several days prior to admission to the hospital.  She has been on antibiotics for treatment of a urinary tract infection. Stool for C. difficile has been ordered but result has not been returned. 4.  UTI (urinary tract infection) with pyuria-she was found to have a urinary tract infection several days ago at the nursing home has been started on antibiotics. The diarrhea has started after treatment for the urinary tract infection. 5.  Anemia- anemia is present with her initial hemoglobin of 10 grams%. Normochromic normocytic. She is on iron which will be continued. Follow-up CBC will be done to monitor stability. Brian Pacheco Resolved Problems:    * No resolved hospital problems. *    Code Status: Full  DVT prophylaxis: Lovenox, compression hosiery      NOTE: This report was transcribed using voice recognition software. Every effort was made to ensure accuracy; however, inadvertent computerized transcription errors may be present.   Electronically signed by Amarilys Bolden MD on 10/4/2020 at 10:07 PM

## 2020-10-05 NOTE — CONSULTS
Gastroenterology Consult Note   Tima Franks [facility] with Angel Castillo M.D. Consult Note        Date of Service: 10/5/2020  Reason for Consult: Ulcerative colitis hx, GI bleed   Requesting Physician: Dr Manuela Easton:  Diarrhea and hypotension    History Obtained From:  patient, electronic medical record    HISTORY OF PRESENT ILLNESS:       Wenona Meigs is a 82 y.o. female with significant past medical history of Colitis, diarrhea, blood in stool, A-Fib, anemia, and HTN admitted via ED for hypotension and diarrhea. Pt with SBP in 80's in ED, she was treated with IV fluids. Pt has been having diarrhea for > 3 months stating stools are dark with red blood associated nausea, vomiting, and poor oral intake. Pt had stools done in [facility] during admission 9/2020, [facility] charts reviewed, all stools negative. She had Sigmoidoscopy done 9/8/2020, full Colonoscopy unable to be done due to significant inflammation rectosigmoid region, biopsies showed pseudomembranous colitis, unable to collagenous colitis. Pt is being treated for UTI with Ceftriaxone, she has a caraballo cath. Pt and daughter reports patient has had diarrhea for over 3 months. Patient's daughter states she has taken her mother to ER 5 times in the past month at [facility]. She was initially diagnosed with diverticulitis, started on antibiotics and at that time her Coumadin was held due to bloody diarrhea. Patient has history of A. fib with pacemaker. She was discharged home doing well, had an episode of atrial fibrillation and her Coumadin was resumed followed by blood in the diarrhea so she went back to emergency. Her Coumadin was stopped at that time and has been off for \"a long time now. \"  Patient was in ED 1 time for TIA also per patient's daughter. Patient's daughter states 3 weeks ago when they were in [facility] for her mom's foul-smelling bloody diarrhea a colonoscopy as stated above was done.   Patient's daughter states she was told by the doctor at [facility] that her mother had ulcerative colitis. Patient reports continuous right lower quadrant abdominal pain described as sharp, worse with movement rated 5/10. Patient's daughter patient's diarrhea is a little darker now that she was started on iron, prior it was lighter in color. Patient's daughter states she had learned regarding her mother because she has been having hallucinations, confusion, and slurred words along with increasing weakness and fatigue. Patient states she has lost 18#/August.  Patient denies nausea, vomiting, chills, fever, hematemesis, or melena. Admission labs: WBC 21.1; H&H 10 & 29.8 (he was 9.4 & 29.1 at [facility] 10/2/2020); RDW 18; lymphs 19%; absolute neuts 15.61; absolute limb 4.01; absolute monos 1.27; INR 1.4; albumin 1.4; alk phos 108; total protein 4.1; sodium 126; chloride 97; BUN 34; creatinine 1.3; calcium 7.3. CT Abd/Pelvis W IV Contrast - Diffuse inflammation of the transverse colon, descending and rectosigmoid colon with wall thickening and edema with active contrast extravasation/bleeding. Hemorrhagic diverticulitis or colitis are considered. There is some edema in the presacral area and tiny amount of air collection in the rectovesical pouch. Walled-off microperforation is considered. Distended gallbladder. Atelectasis/pleural effusion in the left lung base. Consultation for Ulcerative colitis hx, GI bleed . Currently, pt reports continuous abdominal pain RLQ rated 5/10 described as sharp and worse with movement. Patient's last BM was in ED yesterday, bloody loose foul-smelling dark brown. Hemoccult positive in ED. Labs today: H&H 10.1 & 31.3; WBC 32.6; RDW 18.6 neutrophil 81.9%; lymphs 9.5%; absolute new 26.74; abs mono 1.47; absolute eos 0.027; sodium 122; chloride 96; CO2 19; BUN 39; creatinine 1.6; calcium 7; glucose 106.     Past Medical History:        Diagnosis Date    Anemia     Atrial fibrillation (HCC)     Colitis     Hypertension valve replacement. 1 daughter living with thyroid disease and osteoarthritis. Paternal grandmother had CAD. REVIEW OF SYSTEMS:    Aside from what was mentioned in the PMH and HPI, essentially unremarkable, all others negative. PHYSICAL EXAM:      Vitals:    BP (!) 101/54   Pulse 76   Temp 97.5 °F (36.4 °C) (Oral)   Resp 19   Ht 5' 6\" (1.676 m)   Wt 195 lb 12.3 oz (88.8 kg)   SpO2 99%   BMI 31.60 kg/m²       CONSTITUTIONAL:  awake, alert, pale, fatigued apperaing, cooperative, and appears stated age  EYES:  pupils equal, round and reactive to light, sclera anicteric and conjunctiva pale  ENT:  normocephalic, oral pharynx with moist mucous membranes  NECK:  supple   LUNGS:  clear to auscultation bilaterally.   CARDIOVASCULAR:  irregular rate and rhythm, no murmur noted; 2+ pulses; 1+ BLE edema  ABDOMEN:  normal bowel sounds, softly distended, RLQ tenderness to palpation without guarding or rebound, no masses or hepatosplenomegaly noted, difficult to assess in lieu of pain and position  MUSCULOSKELETAL:  full range of motion noted  motor strength is 4 out of 5 all extremities bilaterally  NEUROLOGIC:  Mental Status Exam:  Level of Alertness:   awake  Orientation:   person, place, time -slurred, slow speech  Motor Exam:  Motor exam is symmetrical 4 out of 5 all extremities bilaterally  SKIN:  pale skin color     DATA:    CBC with Differential:    Lab Results   Component Value Date    WBC 32.6 10/05/2020    RBC 3.73 10/05/2020    HGB 10.1 10/05/2020    HCT 31.3 10/05/2020     10/05/2020    MCV 83.9 10/05/2020    MCH 27.1 10/05/2020    MCHC 32.3 10/05/2020    RDW 18.6 10/05/2020    LYMPHOPCT 9.5 10/05/2020    MONOPCT 4.5 10/05/2020    BASOPCT 0.3 10/05/2020    MONOSABS 1.47 10/05/2020    LYMPHSABS 3.10 10/05/2020    EOSABS 0.02 10/05/2020    BASOSABS 0.11 10/05/2020     CMP:    Lab Results   Component Value Date     10/05/2020    K 4.3 10/05/2020    CL 96 10/05/2020    CO2 19 10/05/2020    BUN 39 10/05/2020    CREATININE 1.6 10/05/2020    GFRAA 37 10/05/2020    LABGLOM 31 10/05/2020    GLUCOSE 106 10/05/2020    PROT 4.1 10/04/2020    LABALBU 1.4 10/04/2020    CALCIUM 7.0 10/05/2020    BILITOT 0.5 10/04/2020    ALKPHOS 108 10/04/2020    AST 9 10/04/2020    ALT 11 10/04/2020     Hepatic Function Panel:    Lab Results   Component Value Date    ALKPHOS 108 10/04/2020    ALT 11 10/04/2020    AST 9 10/04/2020    PROT 4.1 10/04/2020    BILITOT 0.5 10/04/2020    LABALBU 1.4 10/04/2020     PT/INR:    Lab Results   Component Value Date    PROTIME 16.6 10/04/2020    INR 1.4 10/04/2020     PTT:    Lab Results   Component Value Date    APTT 25.0 10/04/2020   [APTT}  Last 3 Troponin:    Lab Results   Component Value Date    TROPONINI <0.01 10/04/2020         Ct Abdomen Pelvis W Iv Contrast Additional Contrast? None    Result Date: 10/4/2020  EXAMINATION: CT OF THE ABDOMEN AND PELVIS WITH CONTRAST 10/4/2020 7:53 pm TECHNIQUE: CT of the abdomen and pelvis was performed with the administration of intravenous contrast. Multiplanar reformatted images are provided for review. Dose modulation, iterative reconstruction, and/or weight based adjustment of the mA/kV was utilized to reduce the radiation dose to as low as reasonably achievable. COMPARISON: None. HISTORY: ORDERING SYSTEM PROVIDED HISTORY: abd pain, hypotension, gi bleed TECHNOLOGIST PROVIDED HISTORY: Reason for exam:->abd pain, hypotension, gi bleed Additional Contrast?->None FINDINGS: The lung bases demonstrate cardiomegaly with a tiny pericardial effusion. There is minimal atelectasis and pleural effusion in the lung bases left more than right. The liver is fatty infiltrated. The gallbladder is distended without acute inflammation. Consider ultrasonography for better assessment of gallbladder. Spleen, pancreas, the adrenals and the kidneys are normal except for a 1.5 cm cystic lesion in the right kidney. There is degenerative changes in the lumbar spine. Betsy Wilson The urinary bladder is contracted with a Martin catheter and wall thickening. There is diffuse thickening and inflammation of the transverse colon, descending and rectosigmoid colon with hyperdensity concerning for active bleeding/contrast extravasation. There is inflammatory changes in the presacral area. A tiny air bubble is identified in the rectovesical pouch which could represent waled off microperforation. Appendix cannot be identified. There is some inflammatory changes in the gluteus region     Diffuse inflammation of the transverse colon, descending and rectosigmoid colon with wall thickening and edema with active contrast extravasation/bleeding. Hemorrhagic diverticulitis or colitis are considered. There is some edema in the presacral area and tiny amount of air collection in the rectovesical pouch. Walled-off microperforation is considered. Distended gallbladder. Atelectasis/pleural effusion in the left lung base. RECOMMENDATIONS: The clinical service was notified     Ct Abdomen Pelvis W Iv Contrast Additional Contrast? None    Addendum Date: 10/4/2020    ADDENDUM: In the title of the examination, disregard the CT abdomen and pelvis. Result Date: 10/4/2020  EXAMINATION: ONE XRAY VIEW OF THE CHEST; CT OF THE ABDOMEN AND PELVIS WITH CONTRAST 10/4/2020 7:06 pm COMPARISON: October 4, 2020 HISTORY: ORDERING SYSTEM PROVIDED HISTORY: post R IJ CVC placement TECHNOLOGIST PROVIDED HISTORY: Reason for exam:->post R IJ CVC placement     There is a borderline cardiac size. There is atelectasis in the lung bases with a small left pleural effusion. Left subclavian pacemaker is unchanged. Right IJ central line is noted with the tip at the atrial caval junction. There is no pneumothorax. RECOMMENDATION: Atelectasis and pleural effusion in the lung bases more on the left side. Right IJ central line has noted without complications.      Xr Chest Portable    Addendum Date: 10/4/2020    ADDENDUM: In the title of the examination, disregard the CT abdomen and pelvis. Result Date: 10/4/2020  EXAMINATION: ONE XRAY VIEW OF THE CHEST; CT OF THE ABDOMEN AND PELVIS WITH CONTRAST 10/4/2020 7:06 pm COMPARISON: October 4, 2020 HISTORY: ORDERING SYSTEM PROVIDED HISTORY: post R IJ CVC placement TECHNOLOGIST PROVIDED HISTORY: Reason for exam:->post R IJ CVC placement     There is a borderline cardiac size. There is atelectasis in the lung bases with a small left pleural effusion. Left subclavian pacemaker is unchanged. Right IJ central line is noted with the tip at the atrial caval junction. There is no pneumothorax. RECOMMENDATION: Atelectasis and pleural effusion in the lung bases more on the left side. Right IJ central line has noted without complications. Xr Chest 1 View    Result Date: 10/4/2020  EXAMINATION: ONE XRAY VIEW OF THE CHEST 10/4/2020 4:06 pm COMPARISON: None. HISTORY: ORDERING SYSTEM PROVIDED HISTORY: fatigue TECHNOLOGIST PROVIDED HISTORY: Reason for exam:->fatigue FINDINGS: Cardiac silhouette is not enlarged. Dual-chamber pacemaker in place. Pulmonary vasculature within normal limits. Blunting of the left costophrenic angle and opacity along the left costal margin most likely small left pleural effusion. No airspace consolidation. Suspect left pleural effusion. No airspace consolidation. Dual-chamber pacemaker in place. Follow-up PA and lateral radiographs may be helpful in further evaluation.        IMPRESSION:  · Pancolitis, infectious versus inflammatory, recent sigmoidoscopy with likely pseudomembranous colitis likely secondary to C. difficile -prior stool studies negative, will repeat   · Bloody diarrhea  · Abdominal pain, RLQ  · Unintentional weight  · Hypotension on Levophed-defer to ICU  · Weakness and fatigue  · Severe leukocytosis -ID following  · Hyponatremia, hypochloremia-defer to PCP  · UTI with sepsis-defer to ID  · Diverticulosis with recent diverticulitis  · Walled off microperforation per CT  · Anemia, microcytic, hypochromic  · Dehydration with QI    RECOMMENDATIONS:    · Critical care management per ICU   · Consider surgical consultation, microperforation   · stool studies as ordered to include C. difficile and calprotectin  · CRP and sed rate  · Defer antibiotics to ID  · Blood pressure management per ICU  · IV fluids per ICU  · Electrolyte management per ICU  · DC Bentyl    · Continue Pentasa  · Continue pantoprazole  · Monitor CBC, CMP  · Supportive care  · Continue to monitor    Note: This report was completed utilizing computer voice recognition software. Every effort has been made to ensure accuracy, however; inadvertent computerized transcription errors may be present. Thank you very much for your consultation. We will follow closely with you.     Discussed with Dr. Simon Jaffe developed by Dr. Deana Cheng [id number], FNP-BC 10/5/2020 12:47 PM for Dr. Yesenia Frias

## 2020-10-05 NOTE — PROGRESS NOTES
SPEECH/LANGUAGE PATHOLOGY  CLINICAL ASSESSMENT OF SWALLOWING FUNCTION    PATIENT NAME:  Gaby Myers      :  1935      TODAY'S DATE:  10/5/2020  ROOM:  0217/0217-A    SUMMARY OF EVALUATION    Chart reviewed including most recent chest radiograph. Pt and daughter report occasional coughing on both solids and liquids and pt reports some puree and solids \"get stuck\". Nursing reports coughing after thin liquid per straw. Slurred speech noted during this evaluation. Daughter reports this has been noted for last few days with today being most slurred. DYSPHAGIA DIAGNOSIS:  Oropharyngeal dysphagia      DIET RECOMMENDATIONS:  Dysphagia 2,  Mechanical Soft (Minced & Moist) solids with small sips thin liquids-NO STRAWS    If coughing noted on thin liquid per cup, recommend initiating nectar thick liquids as tolerated at that time. FEEDING RECOMMENDATIONS:       Assistance level:  Full assistance is needed during all oral intake at this time due to weakness/confusion      Compensatory strategies recommended: Small bites/sips, Alternate solids and liquids and No straw    THERAPY RECOMMENDATIONS:      Dysphagia therapy is recommended 3-5 times per week for LOS or when goals are met. Pt will complete oral motor strength/ coordination exercises to improve bolus prep/ control and mastication with  moderate verbal prompts . Pt will complete BOTR strength/ ROM exercises to reduce pharyngeal residuals and improve epiglottic inversion with  moderate verbal prompts.    Pt will complete laryngeal strength/ ROM therapeutic exercises to improve airway protection for the least restrictive PO diet with  moderate verbal prompts   Ongoing meal endurance analysis to provide diet modification and compensatory strategy implementation to minimize risk of aspiration associated with PO intake                   PROCEDURE     Consistencies Administered During the Evaluation   Liquids: thin liquid   Solids:  pureed foods and solid foods      Method of Intake:   cup, straw, spoon  Self fed, Fed by clinician      Position:   Seated, upright                  RESULTS     Oral Stage:         Decreased mastication due to:  cognitive function, Delayed A-P transit due to: cognitive function  and Oral residuals were noted :  throughout the oral cavity      Pharyngeal Stage:      No signs of aspiration were noted during this evaluation for solid, puree or small sips thin liquid however, silent aspiration cannot be ruled out at bedside. If silent aspiration is suspected, a Videofluoroscopic Study of Swallowing (MBS) is recommended and requires a physician order. Throat clearing present after presentation of thin liquid per straw                    The Speech Language Pathologist (SLP) completed education with the patient regarding results of evaluation. Explained that Speech Pathology intervention is warranted  at this time   Prognosis for improvements is fair     This plan will be re-evaluated and revised in 1 week  if warranted. Patient stated goals: Agreed with above   Treatment goals discussed with Patient and Family   The Patient and Family understand(s) the diagnosis, prognosis and plan of care         INTERVENTION/EDUCATION    Pt/family educated on above results and plan of care. Pt/family trained on compensatory strategies for safe swallow with good outcome. Pt encouraged to engage in question/answer session. All questions answered and pt verbalized understanding of above. CPT code:  42820  bedside swallow eval, 29924 dysphagia therapy 15 mins      [x]The admitting diagnosis and active problem list, as listed below have been reviewed prior to initiation of this evaluation.      ADMITTING DIAGNOSIS: Septic shock (HCC) [A41.9, R65.21]  Septic shock ([facility] [address] [A41.9, R65.21]     ACTIVE PROBLEM LIST:   Patient Active Problem List   Diagnosis    Septic shock ([address]    Hyponatremia    Diarrhea    UTI (urinary tract infection) with pyuria    Anemia    Leukocytosis    Acidosis    Hypotension

## 2020-10-05 NOTE — PATIENT CARE CONFERENCE
Intensive Care Daily Quality Rounding Checklist      ICU Team Members: Dr. Santa Rascon, Jasen Mcdowell, Jacklyn Mendez (residents), clinical pharmacist, charge nurse, bedside nurse, respiratory therapist    ICU Day #: 1    Intubation Date:  n/a    Ventilator Day #: n/a    Central Line Insertion Date: October 4,2020        Day #: NUMBER: 2     Arterial Line Insertion Date:  n/a      Day #: n/a    DVT Prophylaxis: Lovenox    GI Prophylaxis:     Martin Catheter Insertion Date: October 4,2020       Day #: 2      Continued need (if yes, reason documented and discussed with physician): yes, strict Is and Os    Skin Issues/ Wounds and ordered treatment discussed on rounds: no issues    Goals/ Plans for the Day: Daily labs, wean Levophed as able, start Hydrocortisone

## 2020-10-05 NOTE — DISCHARGE INSTR - COC
Continuity of Care Form    Patient Name: Trudy Ram   :  1935  MRN:  [id number]    Admit date:  10/4/2020  Discharge date:  ***    Code Status Order: Limited   Advance Directives:   [address] [facility] Documentation     Date/Time Healthcare Directive Type of Healthcare Directive Copy in [address] Agent's Name Healthcare Agent's Phone Number    10/05/20 0021  Yes, patient has an advance directive for healthcare treatment  --  --  --  --  --          Admitting Physician:  Veronica Uribe MD  PCP: Rosa Isela Mosley MD    Discharging Nurse: [facility] Unit/Room#: 0217/0217-A  Discharging Unit Phone Number: ***    Emergency Contact:   Extended Emergency Contact Information  Primary Emergency Contact: [address] [facility] Phone: [phone]  Relation: Child  Secondary Emergency Contact: [address] Phone: [phone]  Relation: Child    Past Surgical History:  History reviewed. No pertinent surgical history. Immunization History: There is no immunization history on file for this patient.     Active Problems:  Patient Active Problem List   Diagnosis Code    Septic shock ([facility] [address] A41.9, R65.21    Hyponatremia E87.1    Diarrhea R19.7    UTI (urinary tract infection) with pyuria N39.0    Anemia D64.9    Leukocytosis D72.829    Acidosis E87.2    Hypotension I95.9       Isolation/Infection:   Isolation          No Isolation        Patient Infection Status     Infection Onset Added Last Indicated Last Indicated By Review Planned Expiration Resolved Resolved By    C-diff Rule Out 10/05/20 10/05/20 10/05/20 Clostridium difficile EIA (Ordered)        Resolved    C-diff Rule Out 10/04/20 10/04/20 10/04/20 Clostridium difficile, EIA (Ordered)   10/05/20 Gabe Leon RN    Order canceled    [id number] Rule Out 10/04/20 10/04/20 10/04/20 COVID-19 (Ordered)   10/04/20 Rule-Out Test Resulted          Nurse Assessment:  Last Vital Signs: BP (!) Speech/Language Therapy  Weight Bearing Status/Restrictions: [facility] Weight Bearin}  Other Medical Equipment (for information only, NOT a DME order):  {EQUIPMENT:800165657}  Other Treatments: ***    Patient's personal belongings (please select all that are sent with patient):  {P DME Belongings:455390807}    RN SIGNATURE:  {Esignature:[id number]}    CASE MANAGEMENT/SOCIAL WORK SECTION    Inpatient Status Date: ***    Readmission Risk Assessment Score:  Readmission Risk              Risk of Unplanned Readmission:        19           Discharging to Facility/ Agency   · Name: [facility]  · [address] [facility]  · Phone:[phone]  · Fax:[phone]    Dialysis Facility (if applicable)   · Name:  · Address:  · Dialysis Schedule:  · Phone:  · Fax:    / signature: {Esignature:[id number]} Electronically signed by USMAN Lopez on 10/5/20 at 2:29 PM EDT      PHYSICIAN SECTION    Prognosis: {Prognosis:3549528687}    Condition at Discharge: Stable    Rehab Potential (if transferring to Rehab): {Prognosis:0737183251}    Recommended Labs or Other Treatments After Discharge: ***    Physician Certification: I certify the above information and transfer of Mariam Lopez  is necessary for the continuing treatment of the diagnosis listed and that she requires [facility] for less 30 days.      Update Admission H&P: {CHP DME Changes in PCJEI:432529443}    PHYSICIAN SIGNATURE:  {Esignature:[id number]}

## 2020-10-05 NOTE — FLOWSHEET NOTE
.  Stage  CI HR SV SVI TFC   Baseline 7.9 75  105 57   Challenge 7.7 74  106 55   Result (%Ä) -2.8% -1.9  0.1% -3.4%

## 2020-10-05 NOTE — CONSULTS
Department of Internal Medicine  Nephrology Attending Consult Note      Reason for Consult: QI  Requesting Physician:  Dr Renae Hearn:  weakness    History Obtained From:  patient, family member - daughter    HISTORY OF PRESENT ILLNESS:    Trudy Ram is a 82 y.o. female with significant past medical history of Colitis, diarrhea, blood in stool, A-Fib, anemia, and HTN admitted via ED for hypotension and diarrhea. Pt with SBP in 80's in ED, she was treated with IV fluids. Pt has been having diarrhea for > 3 months stating stools are dark with red blood associated nausea, vomiting, and poor oral intake. Daughter reports that patient was discharged from [facility] and was living with her. Patient got progressively worse therefore she brought her to ED. Initial labs done showed serum sodium 126 and creatinine of 1.3 mg/dl yesterday which worsened to 122 and 1.6 mg/dl respectively therefore this consultation was ordered. Patient is now lying in bed and denies chest pain or SOB. Past Medical History:        Diagnosis Date    Anemia     Atrial fibrillation ([address]     Colitis     Hypertension      Past Surgical History:    History reviewed. No pertinent surgical history.   Current Medications:    Current Facility-Administered Medications: pantoprazole (PROTONIX) injection 40 mg, 40 mg, Intravenous, Daily  enoxaparin (LOVENOX) injection 30 mg, 30 mg, Subcutaneous, Daily  cefepime (MAXIPIME) 2 g IVPB extended (mini-bag), 2 g, Intravenous, Once  hydrocortisone sodium succinate PF (SOLU-CORTEF) injection 100 mg, 100 mg, Intravenous, Q8H  norepinephrine (LEVOPHED) 16 mg in dextrose 5 % 250 mL infusion, 5 mcg/min, Intravenous, Continuous  acetaminophen (TYLENOL) tablet 650 mg, 650 mg, Oral, Q6H PRN  amitriptyline (ELAVIL) tablet 10 mg, 10 mg, Oral, Nightly  calcium carbonate (TUMS) chewable tablet 500 mg, 500 mg, Oral, Daily  ferrous sulfate (IRON 325) tablet 325 mg, 325 mg, Oral, Daily with breakfast  melatonin tablet 6 mg, 6 mg, Oral, Daily  mesalamine (PENTASA) extended release capsule 500 mg, 500 mg, Oral, 4x Daily  ondansetron (ZOFRAN) tablet 4 mg, 4 mg, Oral, Q6H PRN  sotalol (BETAPACE) tablet 120 mg, 120 mg, Oral, BID  sodium chloride flush 0.9 % injection 10 mL, 10 mL, Intravenous, 2 times per day  sodium chloride flush 0.9 % injection 10 mL, 10 mL, Intravenous, PRN  acetaminophen (TYLENOL) tablet 650 mg, 650 mg, Oral, Q6H PRN **OR** acetaminophen (TYLENOL) suppository 650 mg, 650 mg, Rectal, Q6H PRN  senna (SENOKOT) tablet 8.6 mg, 1 tablet, Oral, Daily PRN  0.9 % sodium chloride infusion, , Intravenous, Continuous  Allergies:  Codeine    Social History:    TOBACCO:  Never used tobacco  ETOH:  Never drank alcohol  DRUGS:  Never used recreational drugs    Family History:   History reviewed. No pertinent family history.   REVIEW OF SYSTEMS:    CONSTITUTIONAL:  positive for  fatigue  EYES:  negative  HEENT:  negative  RESPIRATORY:  negative  CARDIOVASCULAR:  negative  GASTROINTESTINAL:  positive for change in bowel habits and diarrhea  GENITOURINARY:  negative  ENDOCRINE:  positive for weight changes  MUSCULOSKELETAL:  positive for  muscle weakness  PHYSICAL EXAM:      Vitals:    VITALS:  BP (!) 91/48   Pulse 78   Temp 97.4 °F (36.3 °C) (Oral)   Resp 18   Ht 5' 6\" (1.676 m)   Wt 195 lb 12.3 oz (88.8 kg)   SpO2 99%   BMI 31.60 kg/m²   24HR BLOOD PRESSURE RANGE:  Systolic (96YXR), KDA:50 , Min:61 , SSK:240   ; Diastolic (39ZVK), TFD:45, Min:38, Max:76    24HR INTAKE/OUTPUT:    Intake/Output Summary (Last 24 hours) at 10/5/2020 1422  Last data filed at 10/5/2020 1412  Gross per 24 hour   Intake 4026.7 ml   Output 150 ml   Net 3876.7 ml       Constitutional:  Well developed and nourished , no acute distress  HEENT:  NC, PERRL, oral mucosa dry , neck supple, no JVD  Respiratory:  Clear bilaterally except decreased BS at bases  Cardiovascular/Edema: RRR, s1,s2 no gallop  Gastrointestinal:  Soft, benign, bowel sounds increased, no organomegaly  Neurologic:  Alert and Ox 3 , nonfocal  Skin:  Decreased turgor  Other:  No rash    DATA:    CBC:   Lab Results   Component Value Date    WBC 32.6 10/05/2020    RBC 3.73 10/05/2020    HGB 10.1 10/05/2020    HCT 31.3 10/05/2020    MCV 83.9 10/05/2020    MCH 27.1 10/05/2020    MCHC 32.3 10/05/2020    RDW 18.6 10/05/2020     10/05/2020    MPV 8.8 10/05/2020     CMP:    Lab Results   Component Value Date     10/05/2020    K 4.3 10/05/2020    CL 96 10/05/2020    CO2 19 10/05/2020    BUN 39 10/05/2020    CREATININE 1.6 10/05/2020    GFRAA 37 10/05/2020    LABGLOM 31 10/05/2020    GLUCOSE 106 10/05/2020    PROT 4.1 10/04/2020    LABALBU 1.4 10/04/2020    CALCIUM 7.0 10/05/2020    BILITOT 0.5 10/04/2020    ALKPHOS 108 10/04/2020    AST 9 10/04/2020    ALT 11 10/04/2020     Magnesium:    Lab Results   Component Value Date    MG 1.7 10/05/2020     Phosphorus:    Lab Results   Component Value Date    PHOS 4.6 10/05/2020     Uric Acid:  No results found for: Jackson Chávez  U/A:    Lab Results   Component Value Date    COLORU CASEY 10/04/2020    PROTEINU TRACE 10/04/2020    PHUR 5.0 10/04/2020    WBCUA >20 10/04/2020    RBCUA NONE 10/04/2020    BACTERIA FEW 10/04/2020    CLARITYU TURBID 10/04/2020    SPECGRAV 1.025 10/04/2020    LEUKOCYTESUR MODERATE 10/04/2020    UROBILINOGEN 0.2 10/04/2020    [facility] [place] MODERATE 10/04/2020    BLOODU Negative 10/04/2020    GLUCOSEU Negative 10/04/2020     Radiology Review:    Atelectasis and pleural effusion in the lung bases more on the left side.         Right IJ central line has noted without complications.             IMPRESSION/RECOMMENDATIONS:      Kelin Oliver is a 82 y.o. female with significant past medical history of Colitis, diarrhea, blood in stool, A-Fib, anemia, and HTN admitted via ED for hypotension and diarrhea. Pt with SBP in 80's in ED, she was treated with IV fluids.  Pt has been having diarrhea for > 3

## 2020-10-05 NOTE — PROGRESS NOTES
Tosin Rowan,    Your patient is on a medication that requires a renal dose adjustment. Renal Function Assessment:    Date Body Weight IBW Adj. Body Weight SCr CrCl Dialysis status   10/5/2020 88.8 kg 59.3 kg   1.5 27 ml/min         Pharmacy has renally dose-adjusted the following medication(s):    Date Medication Original Dosing Regimen New Dosing Regimen   10/5/2020 Enoxaparin 40 mg daily 30 mg daily           These changes were made per protocol according to the [facility] Renal Function-Based Dose Adjustments Policy    *Please note this dose may need readjusted if your patient's renal function significantly improves. Please contact pharmacy with any questions regarding these changes.

## 2020-10-05 NOTE — CONSULTS
GENERAL SURGERY  CONSULT NOTE  10/5/2020    Physician Consulted: Dr. Aurelio Antunez  Reason for Consult: colitis  Referring Physician: Dr. Lorie Pyle    Chief Complaint   Patient presents with    Hypotension    Diarrhea     HPI  Jessica Burdick is a 82 y.o. female with history of AF previously on coumadin with pacemaker who presents for evaluation of hypotension, diarrhea, speech slurring at [place]. Has had multiple presentations at [facility] over the past month for diarrhea and abodminal pain. Has had diarrhea and intermittent abdominal pain, intermittent bloody stools for about 3 months. Recent antibiotics for UTI urinary retention, and diverticulitis. 9/10 at [place] had aborted colonoscopy for evaluation that should pseudomembranes, biopsies consitent with idiopathic colitis vs inflammatory colitis, She was started on mesalamine for ulcerative colitis. Multiple stools studies and C diff cultures have been negative. The last 4 days has been increasingly fatigued, dry heaving, unable to get out of bed, and came to ED yesterday. Has been hypotensive requiring levophed, with WBC 21 ->30, normal lactic acid, procalcitonin 30. CT AP consistent with colitis, some thickening in descending colon and possible contrast extravasation in the rectosigmoid area. She received 3 doses of PO vancomycin but this has since been held, now on cefepime. Today she has pain mostly in her right abdomen. Has not had any bowel movements or blood per rectum since arriving but has tested FOBT +. No nausea or vomiting. Past Medical History:   Diagnosis Date    Anemia     Atrial fibrillation ([address]     Colitis     Hypertension        History reviewed. No pertinent surgical history. Medications Prior to Admission:    Prior to Admission medications    Medication Sig Start Date End Date Taking?  Authorizing Provider   acetaminophen (TYLENOL) 325 MG tablet Take 650 mg by mouth every 6 hours as needed for Pain   Yes Historical Provider, MD amitriptyline (ELAVIL) 10 MG tablet Take 10 mg by mouth nightly   Yes Historical Provider, MD   dicyclomine (BENTYL) 10 MG capsule Take 10 mg by mouth every 6 hours as needed   Yes Historical Provider, MD   calcium carbonate 600 MG TABS tablet Take 1 tablet by mouth daily   Yes Historical Provider, MD   ferrous sulfate (IRON 325) 325 (65 Fe) MG tablet Take 325 mg by mouth daily (with breakfast)   Yes Historical Provider, MD   lisinopril (PRINIVIL;ZESTRIL) 40 MG tablet Take 40 mg by mouth daily   Yes Historical Provider, MD   melatonin 3 MG TABS tablet Take 6 mg by mouth daily   Yes Historical Provider, MD   tamsulosin (FLOMAX) 0.4 MG capsule Take 0.4 mg by mouth nightly    Historical Provider, MD   loperamide (IMODIUM) 2 MG capsule Take 2 mg by mouth every 12 hours as needed for Diarrhea    Historical Provider, MD   mesalamine (PENTASA) 500 MG extended release capsule Take 500 mg by mouth 4 times daily    Historical Provider, MD   cefTRIAXone (ROCEPHIN) 1 g injection Infuse 1 g intravenously every 24 hours    Historical Provider, MD   sotalol (BETAPACE) 120 MG tablet Take 120 mg by mouth 2 times daily    Historical Provider, MD   ondansetron (ZOFRAN) 4 MG tablet Take 4 mg by mouth every 6 hours as needed for Nausea or Vomiting    Historical Provider, MD       Allergies   Allergen Reactions    Codeine        History reviewed. No pertinent family history.     Social History     Tobacco Use    Smoking status: Never Smoker    Smokeless tobacco: Never Used   Substance Use Topics    Alcohol use: Never     Frequency: Never    Drug use: Never         Review of Systems   General ROS: positive for  - fatigue and weight loss  negative for - chills or fever  Hematological and Lymphatic ROS: positive for - bleeding problems and weight loss  Respiratory ROS: negative for - cough or shortness of breath  Cardiovascular ROS: negative for - chest pain  Gastrointestinal ROS: as above  Genito-Urinary ROS: positive for - indwelling caraballo catheter, urinary retention  Musculoskeletal ROS: negative      PHYSICAL EXAM:    Vitals:    10/05/20 1500   BP: (!) 96/51   Pulse: 74   Resp: 16   Temp:    SpO2:      General appearance: slow to answer but oriented, cooperative, fatigued. Eyes: grossly normal  Lungs: nonlabored breathing  Heart: regular rate  Abdomen: soft, mildly distended, moderate tenderness epigastrium and right abdomen  Rectal: normal sphincter tone, no masses. Thin dark brown stool output  Skin: bilateral lower extremity edema  Neurologic: no focal deficitis    LABS:  CBC  Recent Labs     10/05/20  0600   WBC 32.6*   HGB 10.1*   HCT 31.3*        BMP  Recent Labs     10/05/20  1200   *   K 4.3   CL 96*   CO2 19*   BUN 39*   CREATININE 1.6*   CALCIUM 7.0*     Liver Function  Recent Labs     10/04/20  1708   BILITOT 0.5   AST 9   ALT 11   ALKPHOS 108*   PROT 4.1*   LABALBU 1.4*     No results for input(s): LACTATE in the last 72 hours. Recent Labs     10/04/20  1708   INR 1.4       RADIOLOGY  Ct Abdomen Pelvis W Iv Contrast Additional Contrast? None    Result Date: 10/4/2020  EXAMINATION: CT OF THE ABDOMEN AND PELVIS WITH CONTRAST 10/4/2020 7:53 pm TECHNIQUE: CT of the abdomen and pelvis was performed with the administration of intravenous contrast. Multiplanar reformatted images are provided for review. Dose modulation, iterative reconstruction, and/or weight based adjustment of the mA/kV was utilized to reduce the radiation dose to as low as reasonably achievable. COMPARISON: None. HISTORY: ORDERING SYSTEM PROVIDED HISTORY: abd pain, hypotension, gi bleed TECHNOLOGIST PROVIDED HISTORY: Reason for exam:->abd pain, hypotension, gi bleed Additional Contrast?->None FINDINGS: The lung bases demonstrate cardiomegaly with a tiny pericardial effusion. There is minimal atelectasis and pleural effusion in the lung bases left more than right. The liver is fatty infiltrated.   The gallbladder is distended without acute There is no pneumothorax. RECOMMENDATION: Atelectasis and pleural effusion in the lung bases more on the left side. Right IJ central line has noted without complications. Xr Chest Portable    Addendum Date: 10/4/2020    ADDENDUM: In the title of the examination, disregard the CT abdomen and pelvis. Result Date: 10/4/2020  EXAMINATION: ONE XRAY VIEW OF THE CHEST; CT OF THE ABDOMEN AND PELVIS WITH CONTRAST 10/4/2020 7:06 pm COMPARISON: October 4, 2020 HISTORY: ORDERING SYSTEM PROVIDED HISTORY: post R IJ CVC placement TECHNOLOGIST PROVIDED HISTORY: Reason for exam:->post R IJ CVC placement     There is a borderline cardiac size. There is atelectasis in the lung bases with a small left pleural effusion. Left subclavian pacemaker is unchanged. Right IJ central line is noted with the tip at the atrial caval junction. There is no pneumothorax. RECOMMENDATION: Atelectasis and pleural effusion in the lung bases more on the left side. Right IJ central line has noted without complications. Xr Chest 1 View    Result Date: 10/4/2020  EXAMINATION: ONE XRAY VIEW OF THE CHEST 10/4/2020 4:06 pm COMPARISON: None. HISTORY: ORDERING SYSTEM PROVIDED HISTORY: fatigue TECHNOLOGIST PROVIDED HISTORY: Reason for exam:->fatigue FINDINGS: Cardiac silhouette is not enlarged. Dual-chamber pacemaker in place. Pulmonary vasculature within normal limits. Blunting of the left costophrenic angle and opacity along the left costal margin most likely small left pleural effusion. No airspace consolidation. Suspect left pleural effusion. No airspace consolidation. Dual-chamber pacemaker in place. Follow-up PA and lateral radiographs may be helpful in further evaluation. ASSESSMENT:  82 y.o. female with septic shock - urosepsis vs. Fulminant colitis. Recent extensive workup at [place] - at that time had negative stool cultures and c diff.     PLAN:  Continue ICU resuscitation  Recommend NPO while on significant doses of

## 2020-10-05 NOTE — PROGRESS NOTES
PROGRESS NOTE    Patient Presents with/Seen in Consultation For      *Ulcerative colitis hx, GI bleed   CHIEF COMPLAINT:  Diarrhea and hypotension    Subjective:     Patient states she has diffuse abdominal pain, worse right abdomen. Patient is on the bedpan, assisted her off and like to assist cleaned patient for watery dark green-brown foul-smelling stool-specimen sent to lab. Patient reports no nausea or vomiting. Review of Systems  Aside from what was mentioned in the PMH and HPI, essentially unremarkable, all others negative. Objective:     BP (!) 115/55   Pulse 78   Temp 98.1 °F (36.7 °C) (Oral)   Resp 18   Ht 5' 6\" (1.676 m)   Wt 205 lb 14.6 oz (93.4 kg)   SpO2 98%   BMI 33.23 kg/m²     General appearance: alert, awake, pale, fatigued appearing, laying in bed, and cooperative  Eyes: conjunctiva L sclera anicteric. PERRL. Lungs: Diminished to auscultation bilaterally  Heart: regular rate and rhythm, no murmur, 2+ pulses; 1+ BLE edema and LUE edema  Abdomen: Tight, distended, diffusely tender to palpation > right abdomen with guarding, without rebound; bowel sounds normal; no masses,  no organomegaly  Extremities: extremities with 1+ BLE and LUE edema  Pulses: 2+ and symmetric  Skin: Skin color pale.    Neurologic: Grossly normal    sodium bicarbonate 8.4 % injection 50 mEq, Q5 Min  pantoprazole (PROTONIX) injection 40 mg, Daily  enoxaparin (LOVENOX) injection 30 mg, Daily  albumin human 25 % IV solution 25 g, Q6H  acetaminophen (TYLENOL) tablet 650 mg, Q6H PRN  amitriptyline (ELAVIL) tablet 10 mg, Nightly  calcium carbonate (TUMS) chewable tablet 500 mg, Daily  ferrous sulfate (IRON 325) tablet 325 mg, Daily with breakfast  melatonin tablet 6 mg, Daily  mesalamine (PENTASA) extended release capsule 500 mg, 4x Daily  ondansetron (ZOFRAN) tablet 4 mg, Q6H PRN  sotalol (BETAPACE) tablet 120 mg, BID  sodium chloride flush 0.9 % injection 10 mL, 2 times per day  sodium chloride flush 0.9 % injection 10 mL, PRN  acetaminophen (TYLENOL) tablet 650 mg, Q6H PRN    Or  acetaminophen (TYLENOL) suppository 650 mg, Q6H PRN  senna (SENOKOT) tablet 8.6 mg, Daily PRN  0.9 % sodium chloride infusion, Continuous         Data Review  CBC:   Lab Results   Component Value Date    WBC 30.2 10/06/2020    RBC 2.57 10/06/2020    HGB 7.3 10/06/2020    HCT 22.3 10/06/2020    MCV 86.8 10/06/2020    MCH 28.4 10/06/2020    MCHC 32.7 10/06/2020    RDW 18.5 10/06/2020     10/06/2020    MPV 8.8 10/06/2020     CMP:    Lab Results   Component Value Date     10/06/2020    K 3.8 10/06/2020    K 3.8 10/06/2020    CL 97 10/06/2020    CO2 17 10/06/2020    BUN 42 10/06/2020    CREATININE 1.4 10/06/2020    GFRAA 43 10/06/2020    LABGLOM 36 10/06/2020    GLUCOSE 86 10/06/2020    PROT 4.5 10/06/2020    LABALBU 2.6 10/06/2020    CALCIUM 7.5 10/06/2020    BILITOT 0.7 10/06/2020    ALKPHOS 91 10/06/2020    AST 10 10/06/2020    ALT 8 10/06/2020     Hepatic Function Panel:    Lab Results   Component Value Date    ALKPHOS 91 10/06/2020    ALT 8 10/06/2020    AST 10 10/06/2020    PROT 4.5 10/06/2020    BILITOT 0.7 10/06/2020    BILIDIR 0.4 10/05/2020    IBILI 0.2 10/05/2020    LABALBU 2.6 10/06/2020       PT/INR:    Lab Results   Component Value Date    PROTIME 16.6 10/04/2020    INR 1.4 10/04/2020       Assessment:     Active Problems:  ? Pancolitis, infectious versus inflammatory, recent sigmoidoscopy with likely pseudomembranous colitis likely secondary to C. difficile -prior stool studies negative, will repeat   ? Bloody diarrhea  ? Abdominal pain, diffuse >RLQ  ? Unintentional weight  ? Hypotension on Levophed-defer to ICU  ? Weakness and fatigue  ? Severe leukocytosis -ID following  ? Hyponatremia, hypochloremia-defer to PCP  ? UTI with sepsis-defer to ID  ? Diverticulosis with recent diverticulitis  ? Walled off microperforation per CT  ? Anemia, microcytic, hypochromic  ?  Dehydration with QI      Plan:     ? Critical care management

## 2020-10-05 NOTE — CONSULTS
[address] [place] [address] [facility]  Consultation Note     Admit Date: 10/4/2020  4:26 PM    Reason for Consult:   UTI/diarrhea rule out C. difficile    Attending Physician:  Jhonny Mcclendon DO    HISTORY OF PRESENT ILLNESS:             The history is obtained from extensive review of available past medical records. The patient is a 82 y.o. female who presents to the ED at [facility] from a SNF with hypotension, diarrhea. She was given IV fluids and blood pressure improved. She was being treated for a urinary tract infection with Ceftriaxone. She has a chronic indwelling Martin catheter. The patient says it was placed because she could not urinate. White count was 21.1 and has gone up to 32.6. She was treated with Vancomycin and Zosyn. Admitted to the ICU with septic shock. Past Medical History:        Diagnosis Date    Anemia     Atrial fibrillation ([address]     Colitis     Hypertension      Past Surgical History:    History reviewed. No pertinent surgical history.   Current Medications:   Scheduled Meds:   magnesium sulfate  2 g Intravenous Once    pantoprazole  40 mg Intravenous Daily    enoxaparin  30 mg Subcutaneous Daily    midodrine  5 mg Oral TID WC    amitriptyline  10 mg Oral Nightly    calcium carbonate  500 mg Oral Daily    dicyclomine  10 mg Oral TID    ferrous sulfate  325 mg Oral Daily with breakfast    melatonin  6 mg Oral Daily    mesalamine  500 mg Oral 4x Daily    sotalol  120 mg Oral BID    sodium chloride flush  10 mL Intravenous 2 times per day    cefTRIAXone (ROCEPHIN) IV  1 g Intravenous Q24H     Continuous Infusions:   norepinephrine 18 mcg/min (10/05/20 0900)    sodium chloride 75 mL/hr at 10/05/20 0804     PRN Meds:acetaminophen, ondansetron, sodium chloride flush, acetaminophen **OR** acetaminophen, senna    Allergies:  Codeine    Social History:   Social History     Socioeconomic History    Marital status:      Spouse name: None    Number of children: None    Years of education: None    Highest education level: None   Occupational History    None   Social Needs    Financial resource strain: None    Food insecurity     Worry: None     Inability: None    Transportation needs     Medical: None     Non-medical: None   Tobacco Use    Smoking status: Never Smoker    Smokeless tobacco: Never Used   Substance and Sexual Activity    Alcohol use: Never     Frequency: Never    Drug use: Never    Sexual activity: None   Lifestyle    Physical activity     Days per week: None     Minutes per session: None    Stress: None   Relationships    Social connections     Talks on phone: None     Gets together: None     Attends Jain service: None     Active member of club or organization: None     Attends meetings of clubs or organizations: None     Relationship status: None    Intimate partner violence     Fear of current or ex partner: None     Emotionally abused: None     Physically abused: None     Forced sexual activity: None   Other Topics Concern    None   Social History Narrative    None      Nursing home resident    Family History:   History reviewed. No pertinent family history. . Otherwise non-pertinent to the chief complaint. REVIEW OF SYSTEMS:    Constitutional: Negative for fevers, chills, diaphoresis  Neurologic: Negative   Psychiatric: Negative  Rheumatologic: Negative   Endocrine: Negative  Hematologic: Negative  Immunologic: Negative  ENT: Negative  Respiratory: Negative   Cardiovascular: Negative  GI: As in the HPI  : As in the HPI  Musculoskeletal: Negative  Skin: No rashes. PHYSICAL EXAM:    Vitals:   BP (!) 87/48   Pulse 75   Temp 97.5 °F (36.4 °C) (Oral)   Resp 16   Ht 5' 6\" (1.676 m)   Wt 195 lb 12.3 oz (88.8 kg)   SpO2 99%   BMI 31.60 kg/m²   Constitutional: The patient is awake, alert, and oriented. Skin: Warm and dry. No rashes were noted. HEENT: Eyes show round, and reactive pupils. No jaundice. Moist mucous membranes, no ulcerations, no thrush. Neck: Supple to movements. No lymphadenopathy. Chest: No use of accessory muscles to breathe. Symmetrical expansion. Auscultation reveals no wheezing, crackles, or rhonchi. Cardiovascular: S1 and S2 are rhythmic and regular. No murmurs appreciated. Abdomen: Positive bowel sounds to auscultation. Benign to palpation. No masses felt. No hepatosplenomegaly. Extremities: No clubbing, no cyanosis, no edema. Lines: Right IJ TLC 10/4/2020. Martin catheter.     CBC+dif:  Recent Labs     10/04/20  1708 10/05/20  0600   WBC 21.1* 32.6*   HGB 10.0* 10.1*   HCT 29.8* 31.3*   MCV 84.4 83.9    364   NEUTROABS 15.61* 26.74*     No results found for: CRP   No results found for: CRPHS  No results found for: SEDRATE  Lab Results   Component Value Date    ALT 11 10/04/2020    AST 9 10/04/2020    ALKPHOS 108 (H) 10/04/2020    BILITOT 0.5 10/04/2020     Lab Results   Component Value Date     10/05/2020    K 4.3 10/05/2020    CL 97 10/05/2020    CO2 21 10/05/2020    BUN 38 10/05/2020    CREATININE 1.5 10/05/2020    GFRAA 40 10/05/2020    LABGLOM 33 10/05/2020    GLUCOSE 90 10/05/2020    PROT 4.1 10/04/2020    LABALBU 1.4 10/04/2020    CALCIUM 7.2 10/05/2020    BILITOT 0.5 10/04/2020    ALKPHOS 108 10/04/2020    AST 9 10/04/2020    ALT 11 10/04/2020       Lab Results   Component Value Date    PROTIME 16.6 10/04/2020    INR 1.4 10/04/2020       No results found for: TSH    Lab Results   Component Value Date    COLORU CASEY 10/04/2020    PHUR 5.0 10/04/2020    WBCUA >20 10/04/2020    RBCUA NONE 10/04/2020    BACTERIA FEW 10/04/2020    CLARITYU TURBID 10/04/2020    SPECGRAV 1.025 10/04/2020    LEUKOCYTESUR MODERATE 10/04/2020    UROBILINOGEN 0.2 10/04/2020    BILIRUBINUR MODERATE 10/04/2020    BLOODU Negative 10/04/2020    GLUCOSEU Negative 10/04/2020       No results found for: HCO3, BE, O2SAT, PH, THGB, PCO2, PO2, TCO2  Radiology:  CT of the abdomen and pelvis

## 2020-10-05 NOTE — PROGRESS NOTES
[facility] Hospitalist   Progress Note    Admitting Date and Time: 10/4/2020  4:26 PM  Admit Dx: Septic shock ([facility] [A41.9, R65.21]  Septic shock ([facility] [A41.9, R65.21]    Subjective:    Patient was admitted with Septic shock ([facility] [A41.9, R65.21]  Septic shock ([facility] [A41.9, R65.21]. Patient denies fever, chills, cp, sob, n/v. C/o abd pain     magnesium sulfate  2 g Intravenous Once    pantoprazole  40 mg Intravenous Daily    vancomycin  125 mg Oral 4 times per day    amitriptyline  10 mg Oral Nightly    calcium carbonate  500 mg Oral Daily    dicyclomine  10 mg Oral TID    ferrous sulfate  325 mg Oral Daily with breakfast    melatonin  6 mg Oral Daily    mesalamine  500 mg Oral 4x Daily    sotalol  120 mg Oral BID    tamsulosin  0.4 mg Oral Nightly    sodium chloride flush  10 mL Intravenous 2 times per day    enoxaparin  40 mg Subcutaneous Daily    cefTRIAXone (ROCEPHIN) IV  1 g Intravenous Q24H     acetaminophen, 650 mg, Q6H PRN  ondansetron, 4 mg, Q6H PRN  sodium chloride flush, 10 mL, PRN  acetaminophen, 650 mg, Q6H PRN    Or  acetaminophen, 650 mg, Q6H PRN  senna, 1 tablet, Daily PRN         Objective:        PHYSICAL EXAM:    Vitals:  BP (!) 85/47   Pulse 72   Temp 97.5 °F (36.4 °C) (Oral)   Resp 17   Ht 5' 6\" (1.676 m)   Wt 195 lb 12.3 oz (88.8 kg)   SpO2 99%   BMI 31.60 kg/m²     General:  Appears mildly uncomfortable. Answers questions appropriately and cooperative with exam. [place]  HEENT:  Mucous membranes moist. No erythema, rhinorrhea, or post-nasal drip noted. Neck:  No carotid bruits. Heart:  Rhythm regular at rate of 76  Lungs:  CTA. No wheeze, rales, or rhonchi  Abdomen:  Positive bowel sounds positive. Soft. Mild tenderness to lower quadrants. No guarding, rebound or rigidity. Breast/Rectal/Genitourinary: not pertinent. Extremities:  positive for 1+ b/l lower extremity edema  Skin:  Warm and dry  Vascular: 2/4 Dorsalis Pedis pulses bilaterally.   Neuro: No focal changes noted              Recent Labs     10/04/20  1708 10/05/20  0600   * 127*   K 4.3 4.3   CL 97* 97*   CO2 22 21*   BUN 34* 38*   CREATININE 1.3* 1.5*   GLUCOSE 81 90   CALCIUM 7.3* 7.2*       Recent Labs     10/04/20  1708 10/05/20  0600   WBC 21.1* 32.6*   RBC 3.53 3.73   HGB 10.0* 10.1*   HCT 29.8* 31.3*   MCV 84.4 83.9   MCH 28.3 27.1   MCHC 33.6 32.3   RDW 18.0* 18.6*    364   MPV 8.9 8.8       CBC with Differential:    Lab Results   Component Value Date    WBC 32.6 10/05/2020    RBC 3.73 10/05/2020    HGB 10.1 10/05/2020    HCT 31.3 10/05/2020     10/05/2020    MCV 83.9 10/05/2020    MCH 27.1 10/05/2020    MCHC 32.3 10/05/2020    RDW 18.6 10/05/2020    LYMPHOPCT 9.5 10/05/2020    MONOPCT 4.5 10/05/2020    BASOPCT 0.3 10/05/2020    MONOSABS 1.47 10/05/2020    LYMPHSABS 3.10 10/05/2020    EOSABS 0.02 10/05/2020    BASOSABS 0.11 10/05/2020     BMP:    Lab Results   Component Value Date     10/05/2020    K 4.3 10/05/2020    CL 97 10/05/2020    CO2 21 10/05/2020    BUN 38 10/05/2020    LABALBU 1.4 10/04/2020    CREATININE 1.5 10/05/2020    CALCIUM 7.2 10/05/2020    GFRAA 40 10/05/2020    LABGLOM 33 10/05/2020    GLUCOSE 90 10/05/2020     Magnesium:    Lab Results   Component Value Date    MG 1.7 10/05/2020     Phosphorus:    Lab Results   Component Value Date    PHOS 4.6 10/05/2020     Lactic Acid  1.3           Radiology:   CT ABDOMEN PELVIS W IV CONTRAST Additional Contrast? None   Final Result   Addendum 1 of 1   ADDENDUM:   In the title of the examination, disregard the CT abdomen and pelvis. Final      CT ABDOMEN PELVIS W IV CONTRAST Additional Contrast? None   Final Result   Diffuse inflammation of the transverse colon, descending and rectosigmoid   colon with wall thickening and edema with active contrast   extravasation/bleeding. Hemorrhagic diverticulitis or colitis are   considered.   There is some edema in the presacral area and tiny amount of air collection in the rectovesical pouch. Walled-off microperforation is   considered. Distended gallbladder. Atelectasis/pleural effusion in the left lung base. RECOMMENDATIONS:   The clinical service was notified         XR CHEST PORTABLE   Final Result   Addendum 1 of 1   ADDENDUM:   In the title of the examination, disregard the CT abdomen and pelvis. Final      XR CHEST 1 VIEW   Final Result   Suspect left pleural effusion. No airspace consolidation. Dual-chamber   pacemaker in place. Follow-up PA and lateral radiographs may be helpful in   further evaluation. Assessment:    Active Problems:    Septic shock (HCC)    Hyponatremia    Diarrhea    UTI (urinary tract infection) with pyuria    Anemia  Resolved Problems:    * No resolved hospital problems. *      Plan:  1. Hypotension (septic shock vs hypovolemia) NICOM noted to not be fluid responsive although hx does suggest volume loss. intensivist following. Pressors per intensivist. Currently on levo. 2. SIRS with concern for sepsis(leukocytosis, RR noted to go up to 21 and 33, infection)POA supportive care and tx underlying infection  3. uti possibly due to catheter treatment started as outpt and continued here for now. Will consult ID, in light of possible c. Diff infection. 4. Possible diverticulitis/colitis on rocephin currently. Will defer to ID  5. UC continue mesalamine. bx reviewed and appears to be idiopathic colitis on bx. D/w gi. Pseudomembranous colitis likely and recommended surgical consult with notation of microperforation  6. Diarrhea no episodes so far here. Will defer to ID.   7. Hyponatremia pt given fluids already will continue to closely monitor Na and will ask renal to see. Also, of note, creatinine is elevated and unclear if baseline. 8.  leukocytosis possibly multifactorial monitor closely  9. Atelectasis contributing to leukocytosis  Will order IS  10.  Anemia will monitor closely may drop given hydration  11. Acidosis monitor  12. Hypoalbuminemia edema likely due to third spacing  13. Pleural effusion monitor low albumin may also be contributing  14.  Atrial fib continue sotalol  15. htn stop lisinopril    Chart reviewed and updated by nursing    Time spent is 35 min      Electronically signed by Jeannine Bui DO on 10/5/2020 at 7:59 AM

## 2020-10-06 ENCOUNTER — APPOINTMENT (OUTPATIENT)
Dept: GENERAL RADIOLOGY | Age: 85
DRG: 853 | End: 2020-10-06
Payer: MEDICARE

## 2020-10-06 LAB
ALBUMIN SERPL-MCNC: 2.6 G/DL (ref 3.5–5.2)
ALP BLD-CCNC: 91 U/L (ref 35–104)
ALT SERPL-CCNC: 8 U/L (ref 0–32)
AMMONIA: 47.5 UMOL/L (ref 11–51)
ANION GAP SERPL CALCULATED.3IONS-SCNC: 10 MMOL/L (ref 7–16)
ANION GAP SERPL CALCULATED.3IONS-SCNC: 13 MMOL/L (ref 7–16)
ANION GAP SERPL CALCULATED.3IONS-SCNC: 14 MMOL/L (ref 7–16)
ANISOCYTOSIS: ABNORMAL
AST SERPL-CCNC: 10 U/L (ref 0–31)
BASOPHILS ABSOLUTE: 0 E9/L (ref 0–0.2)
BASOPHILS RELATIVE PERCENT: 0 % (ref 0–2)
BILIRUB SERPL-MCNC: 0.7 MG/DL (ref 0–1.2)
BUN BLDV-MCNC: 39 MG/DL (ref 8–23)
BUN BLDV-MCNC: 42 MG/DL (ref 8–23)
BUN BLDV-MCNC: 42 MG/DL (ref 8–23)
BURR CELLS: ABNORMAL
C DIFF TOXIN/ANTIGEN: ABNORMAL
C-REACTIVE PROTEIN: 14.3 MG/DL (ref 0–0.4)
CALCIUM SERPL-MCNC: 7 MG/DL (ref 8.6–10.2)
CALCIUM SERPL-MCNC: 7.4 MG/DL (ref 8.6–10.2)
CALCIUM SERPL-MCNC: 7.5 MG/DL (ref 8.6–10.2)
CHLORIDE BLD-SCNC: 97 MMOL/L (ref 98–107)
CHLORIDE BLD-SCNC: 98 MMOL/L (ref 98–107)
CHLORIDE BLD-SCNC: 99 MMOL/L (ref 98–107)
CO2: 17 MMOL/L (ref 22–29)
CO2: 18 MMOL/L (ref 22–29)
CO2: 19 MMOL/L (ref 22–29)
CREAT SERPL-MCNC: 1.1 MG/DL (ref 0.5–1)
CREAT SERPL-MCNC: 1.4 MG/DL (ref 0.5–1)
CREAT SERPL-MCNC: 1.5 MG/DL (ref 0.5–1)
DOHLE BODIES: ABNORMAL
EKG ATRIAL RATE: 78 BPM
EKG P AXIS: 87 DEGREES
EKG P-R INTERVAL: 150 MS
EKG Q-T INTERVAL: 456 MS
EKG QRS DURATION: 96 MS
EKG QTC CALCULATION (BAZETT): 519 MS
EKG R AXIS: -5 DEGREES
EKG T AXIS: -20 DEGREES
EKG VENTRICULAR RATE: 78 BPM
EOSINOPHILS ABSOLUTE: 0 E9/L (ref 0.05–0.5)
EOSINOPHILS RELATIVE PERCENT: 0 % (ref 0–6)
GFR AFRICAN AMERICAN: 40
GFR AFRICAN AMERICAN: 43
GFR AFRICAN AMERICAN: 57
GFR NON-AFRICAN AMERICAN: 33 ML/MIN/1.73
GFR NON-AFRICAN AMERICAN: 36 ML/MIN/1.73
GFR NON-AFRICAN AMERICAN: 47 ML/MIN/1.73
GLUCOSE BLD-MCNC: 86 MG/DL (ref 74–99)
GLUCOSE BLD-MCNC: 88 MG/DL (ref 74–99)
GLUCOSE BLD-MCNC: 94 MG/DL (ref 74–99)
HCT VFR BLD CALC: 20.4 % (ref 34–48)
HCT VFR BLD CALC: 21 % (ref 34–48)
HCT VFR BLD CALC: 22.3 % (ref 34–48)
HEMOGLOBIN: 6.6 G/DL (ref 11.5–15.5)
HEMOGLOBIN: 7 G/DL (ref 11.5–15.5)
HEMOGLOBIN: 7.3 G/DL (ref 11.5–15.5)
LACTIC ACID: 0.6 MMOL/L (ref 0.5–2.2)
LYMPHOCYTES ABSOLUTE: 1.81 E9/L (ref 1.5–4)
LYMPHOCYTES RELATIVE PERCENT: 6 % (ref 20–42)
MAGNESIUM: 2.1 MG/DL (ref 1.6–2.6)
MCH RBC QN AUTO: 28.4 PG (ref 26–35)
MCHC RBC AUTO-ENTMCNC: 32.7 % (ref 32–34.5)
MCV RBC AUTO: 86.8 FL (ref 80–99.9)
METAMYELOCYTES RELATIVE PERCENT: 3 % (ref 0–1)
MONOCYTES ABSOLUTE: 0.3 E9/L (ref 0.1–0.95)
MONOCYTES RELATIVE PERCENT: 1 % (ref 2–12)
MRSA CULTURE ONLY: NORMAL
MYELOCYTE PERCENT: 1 % (ref 0–0)
NEUTROPHILS ABSOLUTE: 27.78 E9/L (ref 1.8–7.3)
NEUTROPHILS RELATIVE PERCENT: 88 % (ref 43–80)
PDW BLD-RTO: 18.5 FL (ref 11.5–15)
PHOSPHORUS: 3.9 MG/DL (ref 2.5–4.5)
PLATELET # BLD: 207 E9/L (ref 130–450)
PMV BLD AUTO: 8.8 FL (ref 7–12)
POIKILOCYTES: ABNORMAL
POLYCHROMASIA: ABNORMAL
POTASSIUM REFLEX MAGNESIUM: 3.6 MMOL/L (ref 3.5–5)
POTASSIUM REFLEX MAGNESIUM: 3.8 MMOL/L (ref 3.5–5)
POTASSIUM SERPL-SCNC: 3.8 MMOL/L (ref 3.5–5)
POTASSIUM SERPL-SCNC: 4.3 MMOL/L (ref 3.5–5)
PROMYELOCYTES PERCENT: 1 % (ref 0–0)
RBC # BLD: 2.57 E12/L (ref 3.5–5.5)
ROTAVIRUS ANTIGEN: NORMAL
SEDIMENTATION RATE, ERYTHROCYTE: 6 MM/HR (ref 0–20)
SODIUM BLD-SCNC: 126 MMOL/L (ref 132–146)
SODIUM BLD-SCNC: 127 MMOL/L (ref 132–146)
SODIUM BLD-SCNC: 132 MMOL/L (ref 132–146)
TOTAL PROTEIN: 4.5 G/DL (ref 6.4–8.3)
TOXIC GRANULATION: ABNORMAL
URINE CULTURE, ROUTINE: NORMAL
WBC # BLD: 30.2 E9/L (ref 4.5–11.5)

## 2020-10-06 PROCEDURE — C9113 INJ PANTOPRAZOLE SODIUM, VIA: HCPCS | Performed by: FAMILY MEDICINE

## 2020-10-06 PROCEDURE — 6370000000 HC RX 637 (ALT 250 FOR IP): Performed by: INTERNAL MEDICINE

## 2020-10-06 PROCEDURE — 87449 NOS EACH ORGANISM AG IA: CPT

## 2020-10-06 PROCEDURE — 6360000002 HC RX W HCPCS: Performed by: SPECIALIST

## 2020-10-06 PROCEDURE — 6360000002 HC RX W HCPCS: Performed by: FAMILY MEDICINE

## 2020-10-06 PROCEDURE — 74018 RADEX ABDOMEN 1 VIEW: CPT

## 2020-10-06 PROCEDURE — 86140 C-REACTIVE PROTEIN: CPT

## 2020-10-06 PROCEDURE — 2500000003 HC RX 250 WO HCPCS: Performed by: INTERNAL MEDICINE

## 2020-10-06 PROCEDURE — 6370000000 HC RX 637 (ALT 250 FOR IP): Performed by: SPECIALIST

## 2020-10-06 PROCEDURE — 83735 ASSAY OF MAGNESIUM: CPT

## 2020-10-06 PROCEDURE — 87077 CULTURE AEROBIC IDENTIFY: CPT

## 2020-10-06 PROCEDURE — 84100 ASSAY OF PHOSPHORUS: CPT

## 2020-10-06 PROCEDURE — 6360000002 HC RX W HCPCS: Performed by: INTERNAL MEDICINE

## 2020-10-06 PROCEDURE — 2580000003 HC RX 258: Performed by: INTERNAL MEDICINE

## 2020-10-06 PROCEDURE — 87328 CRYPTOSPORIDIUM AG IA: CPT

## 2020-10-06 PROCEDURE — 6370000000 HC RX 637 (ALT 250 FOR IP)

## 2020-10-06 PROCEDURE — 2580000003 HC RX 258: Performed by: SPECIALIST

## 2020-10-06 PROCEDURE — P9047 ALBUMIN (HUMAN), 25%, 50ML: HCPCS | Performed by: INTERNAL MEDICINE

## 2020-10-06 PROCEDURE — 87045 FECES CULTURE AEROBIC BACT: CPT

## 2020-10-06 PROCEDURE — 83605 ASSAY OF LACTIC ACID: CPT

## 2020-10-06 PROCEDURE — 83993 ASSAY FOR CALPROTECTIN FECAL: CPT

## 2020-10-06 PROCEDURE — 36592 COLLECT BLOOD FROM PICC: CPT

## 2020-10-06 PROCEDURE — 80053 COMPREHEN METABOLIC PANEL: CPT

## 2020-10-06 PROCEDURE — 97110 THERAPEUTIC EXERCISES: CPT

## 2020-10-06 PROCEDURE — 87425 ROTAVIRUS AG IA: CPT

## 2020-10-06 PROCEDURE — 99233 SBSQ HOSP IP/OBS HIGH 50: CPT | Performed by: INTERNAL MEDICINE

## 2020-10-06 PROCEDURE — 85018 HEMOGLOBIN: CPT

## 2020-10-06 PROCEDURE — 82140 ASSAY OF AMMONIA: CPT

## 2020-10-06 PROCEDURE — 85025 COMPLETE CBC W/AUTO DIFF WBC: CPT

## 2020-10-06 PROCEDURE — 87324 CLOSTRIDIUM AG IA: CPT

## 2020-10-06 PROCEDURE — 92526 ORAL FUNCTION THERAPY: CPT | Performed by: SPEECH-LANGUAGE PATHOLOGIST

## 2020-10-06 PROCEDURE — 80048 BASIC METABOLIC PNL TOTAL CA: CPT

## 2020-10-06 PROCEDURE — 2060000000 HC ICU INTERMEDIATE R&B

## 2020-10-06 PROCEDURE — 97161 PT EVAL LOW COMPLEX 20 MIN: CPT

## 2020-10-06 PROCEDURE — 99232 SBSQ HOSP IP/OBS MODERATE 35: CPT | Performed by: SURGERY

## 2020-10-06 PROCEDURE — 2700000000 HC OXYGEN THERAPY PER DAY

## 2020-10-06 PROCEDURE — 85014 HEMATOCRIT: CPT

## 2020-10-06 PROCEDURE — 2500000003 HC RX 250 WO HCPCS: Performed by: SPECIALIST

## 2020-10-06 PROCEDURE — 36415 COLL VENOUS BLD VENIPUNCTURE: CPT

## 2020-10-06 PROCEDURE — 85651 RBC SED RATE NONAUTOMATED: CPT

## 2020-10-06 RX ORDER — DIMETHICONE, OXYBENZONE, AND PADIMATE O 2; 2.5; 6.6 G/100G; G/100G; G/100G
STICK TOPICAL
Status: COMPLETED
Start: 2020-10-06 | End: 2020-10-06

## 2020-10-06 RX ORDER — SODIUM CHLORIDE 9 MG/ML
INJECTION, SOLUTION INTRAVENOUS EVERY 24 HOURS
Status: DISCONTINUED | OUTPATIENT
Start: 2020-10-06 | End: 2020-10-07

## 2020-10-06 RX ADMIN — ALBUMIN (HUMAN) 25 G: 0.25 INJECTION, SOLUTION INTRAVENOUS at 15:42

## 2020-10-06 RX ADMIN — Medication: at 09:34

## 2020-10-06 RX ADMIN — MESALAMINE 500 MG: 250 CAPSULE ORAL at 21:13

## 2020-10-06 RX ADMIN — SOTALOL HYDROCHLORIDE 120 MG: 120 TABLET ORAL at 09:59

## 2020-10-06 RX ADMIN — PANTOPRAZOLE SODIUM 40 MG: 40 INJECTION, POWDER, FOR SOLUTION INTRAVENOUS at 09:29

## 2020-10-06 RX ADMIN — METRONIDAZOLE 500 MG: 500 INJECTION, SOLUTION INTRAVENOUS at 13:14

## 2020-10-06 RX ADMIN — HYDROCORTISONE SODIUM SUCCINATE 100 MG: 100 INJECTION, POWDER, FOR SOLUTION INTRAMUSCULAR; INTRAVENOUS at 04:11

## 2020-10-06 RX ADMIN — METRONIDAZOLE 500 MG: 500 INJECTION, SOLUTION INTRAVENOUS at 20:00

## 2020-10-06 RX ADMIN — ALBUMIN (HUMAN) 25 G: 0.25 INJECTION, SOLUTION INTRAVENOUS at 21:00

## 2020-10-06 RX ADMIN — CEFEPIME HYDROCHLORIDE 2 G: 2 INJECTION, POWDER, FOR SOLUTION INTRAVENOUS at 13:14

## 2020-10-06 RX ADMIN — SODIUM CHLORIDE: 9 INJECTION, SOLUTION INTRAVENOUS at 09:29

## 2020-10-06 RX ADMIN — SODIUM BICARBONATE 50 MEQ: 84 INJECTION, SOLUTION INTRAVENOUS at 10:50

## 2020-10-06 RX ADMIN — ALBUMIN (HUMAN) 25 G: 0.25 INJECTION, SOLUTION INTRAVENOUS at 09:29

## 2020-10-06 RX ADMIN — Medication 250 MG: at 18:04

## 2020-10-06 RX ADMIN — CALCIUM CARBONATE 500 MG: 500 TABLET, CHEWABLE ORAL at 09:29

## 2020-10-06 RX ADMIN — MESALAMINE 500 MG: 250 CAPSULE ORAL at 18:04

## 2020-10-06 RX ADMIN — SODIUM CHLORIDE, PRESERVATIVE FREE 10 ML: 5 INJECTION INTRAVENOUS at 21:10

## 2020-10-06 RX ADMIN — AMITRIPTYLINE HYDROCHLORIDE 10 MG: 10 TABLET, FILM COATED ORAL at 21:13

## 2020-10-06 RX ADMIN — Medication 250 MG: at 15:14

## 2020-10-06 RX ADMIN — Medication 6 MG: at 21:13

## 2020-10-06 RX ADMIN — ALBUMIN (HUMAN) 25 G: 0.25 INJECTION, SOLUTION INTRAVENOUS at 03:30

## 2020-10-06 RX ADMIN — SODIUM CHLORIDE, PRESERVATIVE FREE 10 ML: 5 INJECTION INTRAVENOUS at 09:30

## 2020-10-06 RX ADMIN — ACETAMINOPHEN 650 MG: 325 TABLET ORAL at 23:57

## 2020-10-06 RX ADMIN — SODIUM BICARBONATE 50 MEQ: 84 INJECTION, SOLUTION INTRAVENOUS at 10:45

## 2020-10-06 RX ADMIN — SOTALOL HYDROCHLORIDE 120 MG: 120 TABLET ORAL at 21:13

## 2020-10-06 RX ADMIN — SODIUM CHLORIDE: 9 INJECTION, SOLUTION INTRAVENOUS at 23:22

## 2020-10-06 RX ADMIN — FERROUS SULFATE TAB 325 MG (65 MG ELEMENTAL FE) 325 MG: 325 (65 FE) TAB at 09:29

## 2020-10-06 RX ADMIN — MESALAMINE 500 MG: 250 CAPSULE ORAL at 09:34

## 2020-10-06 RX ADMIN — MESALAMINE 500 MG: 250 CAPSULE ORAL at 13:22

## 2020-10-06 ASSESSMENT — PAIN SCALES - GENERAL
PAINLEVEL_OUTOF10: 0
PAINLEVEL_OUTOF10: 6
PAINLEVEL_OUTOF10: 0

## 2020-10-06 NOTE — PROGRESS NOTES
Department of Internal Medicine  Nephrology Attending Progress Note      Reason for Consult: QI  Requesting Physician:  Dr Joelle Lee:  weakness    History Obtained From:  patient, family member - daughter    Sub: Patient seen and examined bedside in the ICU, events reviewed with the ICU RN, her blood pressure is stable, she denies any shortness of breath, saturating well on room 8. Past Medical History:        Diagnosis Date    Anemia     Atrial fibrillation ([address]     Colitis     Hypertension      Past Surgical History:    History reviewed. No pertinent surgical history.   Current Medications:    Current Facility-Administered Medications: sodium bicarbonate 8.4 % injection 50 mEq, 50 mEq, Intravenous, Q5 Min  pantoprazole (PROTONIX) injection 40 mg, 40 mg, Intravenous, Daily  enoxaparin (LOVENOX) injection 30 mg, 30 mg, Subcutaneous, Daily  albumin human 25 % IV solution 25 g, 25 g, Intravenous, Q6H  acetaminophen (TYLENOL) tablet 650 mg, 650 mg, Oral, Q6H PRN  amitriptyline (ELAVIL) tablet 10 mg, 10 mg, Oral, Nightly  calcium carbonate (TUMS) chewable tablet 500 mg, 500 mg, Oral, Daily  ferrous sulfate (IRON 325) tablet 325 mg, 325 mg, Oral, Daily with breakfast  melatonin tablet 6 mg, 6 mg, Oral, Daily  mesalamine (PENTASA) extended release capsule 500 mg, 500 mg, Oral, 4x Daily  ondansetron (ZOFRAN) tablet 4 mg, 4 mg, Oral, Q6H PRN  sotalol (BETAPACE) tablet 120 mg, 120 mg, Oral, BID  sodium chloride flush 0.9 % injection 10 mL, 10 mL, Intravenous, 2 times per day  sodium chloride flush 0.9 % injection 10 mL, 10 mL, Intravenous, PRN  acetaminophen (TYLENOL) tablet 650 mg, 650 mg, Oral, Q6H PRN **OR** acetaminophen (TYLENOL) suppository 650 mg, 650 mg, Rectal, Q6H PRN  senna (SENOKOT) tablet 8.6 mg, 1 tablet, Oral, Daily PRN  0.9 % sodium chloride infusion, , Intravenous, Continuous  Allergies:  Codeine    Social History:    TOBACCO:  Never used tobacco  ETOH:  Never drank alcohol  DRUGS: Never used recreational drugs    Family History:   History reviewed. No pertinent family history.   REVIEW OF SYSTEMS:    CONSTITUTIONAL:  positive for  fatigue  EYES:  negative  HEENT:  negative  RESPIRATORY:  negative  CARDIOVASCULAR:  negative  GASTROINTESTINAL:  positive for change in bowel habits and diarrhea  GENITOURINARY:  negative  ENDOCRINE:  positive for weight changes  MUSCULOSKELETAL:  positive for  muscle weakness  PHYSICAL EXAM:      Vitals:    VITALS:  BP (!) 115/55   Pulse 78   Temp 98.1 °F (36.7 °C) (Oral)   Resp 18   Ht 5' 6\" (1.676 m)   Wt 205 lb 14.6 oz (93.4 kg)   SpO2 98%   BMI 33.23 kg/m²   24HR BLOOD PRESSURE RANGE:  Systolic (41DPZ), WAGNER:194 , Min:89 , MOP:003   ; Diastolic (83BNV), DPY:22, Min:47, Max:64    24HR INTAKE/OUTPUT:      Intake/Output Summary (Last 24 hours) at 10/6/2020 1016  Last data filed at 10/6/2020 0815  Gross per 24 hour   Intake 3833.7 ml   Output 577 ml   Net 3256.7 ml       Constitutional:  Well developed and nourished , no acute distress  HEENT:  NC, PERRL, oral mucosa dry , neck supple, no JVD  Respiratory:  Clear bilaterally except decreased BS at bases  Cardiovascular/Edema: RRR, s1,s2 no gallop  Gastrointestinal:  Soft, benign, bowel sounds increased, no organomegaly  Neurologic:  Alert and Ox 3 , nonfocal  Skin:  Decreased turgor  Other:  No rash    DATA:    CBC:   Lab Results   Component Value Date    WBC 30.2 10/06/2020    RBC 2.57 10/06/2020    HGB 7.3 10/06/2020    HCT 22.3 10/06/2020    MCV 86.8 10/06/2020    MCH 28.4 10/06/2020    MCHC 32.7 10/06/2020    RDW 18.5 10/06/2020     10/06/2020    MPV 8.8 10/06/2020     CMP:    Lab Results   Component Value Date     10/06/2020    K 3.8 10/06/2020    K 3.8 10/06/2020    CL 97 10/06/2020    CO2 17 10/06/2020    BUN 42 10/06/2020    CREATININE 1.4 10/06/2020    GFRAA 43 10/06/2020    LABGLOM 36 10/06/2020    GLUCOSE 86 10/06/2020    PROT 4.5 10/06/2020    LABALBU 2.6 10/06/2020    CALCIUM 7.5 10/06/2020    BILITOT 0.7 10/06/2020    ALKPHOS 91 10/06/2020    AST 10 10/06/2020    ALT 8 10/06/2020     Magnesium:    Lab Results   Component Value Date    MG 2.1 10/06/2020     Phosphorus:    Lab Results   Component Value Date    PHOS 3.9 10/06/2020     Uric Acid:  No results found for: Chely Burch  U/A:    Lab Results   Component Value Date    COLORU CASEY 10/04/2020    PROTEINU TRACE 10/04/2020    PHUR 5.0 10/04/2020    WBCUA >20 10/04/2020    RBCUA NONE 10/04/2020    BACTERIA FEW 10/04/2020    CLARITYU TURBID 10/04/2020    SPECGRAV 1.025 10/04/2020    LEUKOCYTESUR MODERATE 10/04/2020    UROBILINOGEN 0.2 10/04/2020    BILIRUBINUR MODERATE 10/04/2020    BLOODU Negative 10/04/2020    GLUCOSEU Negative 10/04/2020     Radiology Review:    Atelectasis and pleural effusion in the lung bases more on the left side.         Right IJ central line has noted without complications.             IMPRESSION/RECOMMENDATIONS:      Corwin Vance is a 82 y.o. female with significant past medical history of Colitis, diarrhea, blood in stool, A-Fib, anemia, and HTN admitted via ED for hypotension and diarrhea. Pt with SBP in 80's in ED, she was treated with IV fluids. Pt has been having diarrhea for > 3 months stating stools are dark with red blood associated nausea, vomiting, and poor oral intake. Daughter reports that patient was discharged from [facility] and was living with her. Patient got progressively worse therefore she brought her to ED. Initial labs done showed serum sodium 126 and creatinine of 1.3 mg/dl yesterday which worsened to 122 and 1.6 mg/dl respectively therefore this consultation was ordered. 1. QI secondary to volume responsive prerenal state versus ischemic ATN    2. Hyponatremia probably secondary to GI losses  3. Pancolitis/c.diff - ID following  4. Anemia - rule out iron deficiency  5. Severe hypoalbuminemia/malnutrition  6.   Non-anion gap metabolic acidosis secondary to bicarbonate losses from diarrhea    PLAN:    Hypovolemic hyponatremia, sodium continues to improve with IV normal saline  Continue with normal saline at 100 mL/h  Change BMP checks to twice daily now, nurse to call if the sodium is less than 125 or greater than 130  Renal function continues to improve  Obtain anemia panel with a.m. labs  Started on oral vancomycin and Flagyl for C. difficile colitis.   Received IV bicarbonate supplementation per ICU team    Lorraine Bee MD

## 2020-10-06 NOTE — PROGRESS NOTES
Priti Cornejo Hospitalist   Progress Note    Admitting Date and Time: 10/4/2020  4:26 PM  Admit Dx: Septic shock ([facility] [address] [A41.9, R65.21]  Septic shock ([facility] [address] [A41.9, R65.21]    Subjective:    Patient was admitted with Septic shock ([facility] [address] [A41.9, R65.21]  Septic shock ([address] [A41.9, R65.21]. Patient denies fever, chills, cp, sob, n/v.     metroNIDAZOLE  500 mg Intravenous Q8H    vancomycin  250 mg Oral 4 times per day    pantoprazole  40 mg Intravenous Daily    enoxaparin  30 mg Subcutaneous Daily    albumin human  25 g Intravenous Q6H    amitriptyline  10 mg Oral Nightly    calcium carbonate  500 mg Oral Daily    ferrous sulfate  325 mg Oral Daily with breakfast    melatonin  6 mg Oral Daily    mesalamine  500 mg Oral 4x Daily    sotalol  120 mg Oral BID    sodium chloride flush  10 mL Intravenous 2 times per day     acetaminophen, 650 mg, Q6H PRN  ondansetron, 4 mg, Q6H PRN  sodium chloride flush, 10 mL, PRN  acetaminophen, 650 mg, Q6H PRN    Or  acetaminophen, 650 mg, Q6H PRN  senna, 1 tablet, Daily PRN         Objective:          PHYSICAL EXAM:    Vitals:  BP (!) 121/57   Pulse 87   Temp 97.8 °F (36.6 °C) (Oral)   Resp 19   Ht 5' 6\" (1.676 m)   Wt 205 lb 14.6 oz (93.4 kg)   SpO2 97%   BMI 33.23 kg/m²     General:  Appears comfortable. Answers questions appropriately and cooperative with exam  HEENT:  Mucous membranes moist. No erythema, rhinorrhea, or post-nasal drip noted. Neck:  No carotid bruits. Heart:  Rhythm regular at rate of 88  Lungs:  CTA. No wheeze, rales, or rhonchi  Abdomen:  Positive bowel sounds positive. Soft. Non-tender. No guarding, rebound or rigidity. Breast/Rectal/Genitourinary: not pertinent. Extremities:  positive for 1+ b/l lower extremity edema  Skin:  Warm and dry  Vascular: 2/4 Dorsalis Pedis pulses bilaterally. Neuro:  Limited due to pt's status.  No focal changes noted          Recent Labs     10/05/20  1748 10/06/20  0045 10/06/20  0605   * 126* 127*   K 4.4 4.3 3.8  3.8   CL 94* 98 97*   CO2 18* 18* 17*   BUN 40* 42* 42*   CREATININE 1.6* 1.5* 1.4*   GLUCOSE 95 94 86   CALCIUM 7.1* 7.0* 7.5*       Recent Labs     10/04/20  1708 10/05/20  0600 10/06/20  0605 10/06/20  1205   WBC 21.1* 32.6* 30.2*  --    RBC 3.53 3.73 2.57*  --    HGB 10.0* 10.1* 7.3* 7.0*   HCT 29.8* 31.3* 22.3* 21.0*   MCV 84.4 83.9 86.8  --    MCH 28.3 27.1 28.4  --    MCHC 33.6 32.3 32.7  --    RDW 18.0* 18.6* 18.5*  --     364 207  --    MPV 8.9 8.8 8.8  --        CBC with Differential:    Lab Results   Component Value Date    WBC 30.2 10/06/2020    RBC 2.57 10/06/2020    HGB 7.0 10/06/2020    HCT 21.0 10/06/2020     10/06/2020    MCV 86.8 10/06/2020    MCH 28.4 10/06/2020    MCHC 32.7 10/06/2020    RDW 18.5 10/06/2020    METASPCT 3.0 10/06/2020    LYMPHOPCT 6.0 10/06/2020    PROMYELOPCT 1.0 10/06/2020    MONOPCT 1.0 10/06/2020    MYELOPCT 1.0 10/06/2020    BASOPCT 0.0 10/06/2020    MONOSABS 0.30 10/06/2020    LYMPHSABS 1.81 10/06/2020    EOSABS 0.00 10/06/2020    BASOSABS 0.00 10/06/2020     CMP:    Lab Results   Component Value Date     10/06/2020    K 3.8 10/06/2020    K 3.8 10/06/2020    CL 97 10/06/2020    CO2 17 10/06/2020    BUN 42 10/06/2020    CREATININE 1.4 10/06/2020    GFRAA 43 10/06/2020    LABGLOM 36 10/06/2020    GLUCOSE 86 10/06/2020    PROT 4.5 10/06/2020    LABALBU 2.6 10/06/2020    CALCIUM 7.5 10/06/2020    BILITOT 0.7 10/06/2020    ALKPHOS 91 10/06/2020    AST 10 10/06/2020    ALT 8 10/06/2020     BMP:    Lab Results   Component Value Date     10/06/2020    K 3.8 10/06/2020    K 3.8 10/06/2020    CL 97 10/06/2020    CO2 17 10/06/2020    BUN 42 10/06/2020    LABALBU 2.6 10/06/2020    CREATININE 1.4 10/06/2020    CALCIUM 7.5 10/06/2020    GFRAA 43 10/06/2020    LABGLOM 36 10/06/2020    GLUCOSE 86 10/06/2020     Magnesium:    Lab Results   Component Value Date    MG 2.1 10/06/2020     Phosphorus:    Lab Results   Component Value Date PHOS 3.9 10/06/2020        Radiology:   XR ABDOMEN (KUB) (SINGLE AP VIEW)   Final Result   Thickening of the transverse colon wall. Consistent with inflammation   identified on prior CT. US GALLBLADDER RUQ   Final Result   Cholelithiasis with positive sonographic Hopson's sign but no wall thickening   or pericholecystic inflammatory changes, altogether equivocal for   cholecystitis. Consider functional analysis with nuclear medicine HIDA scan. Hepatic steatosis. 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 Suspected small right pleural effusion. CT ABDOMEN PELVIS W IV CONTRAST Additional Contrast? None   Final Result   Addendum 1 of 1   ADDENDUM:   In the title of the examination, disregard the CT abdomen and pelvis. Final      CT ABDOMEN PELVIS W IV CONTRAST Additional Contrast? None   Final Result   Diffuse inflammation of the transverse colon, descending and rectosigmoid   colon with wall thickening and edema with active contrast   extravasation/bleeding. Hemorrhagic diverticulitis or colitis are   considered. There is some edema in the presacral area and tiny amount of air   collection in the rectovesical pouch. Walled-off microperforation is   considered. Distended gallbladder. Atelectasis/pleural effusion in the left lung base. RECOMMENDATIONS:   The clinical service was notified         XR CHEST PORTABLE   Final Result   Addendum 1 of 1   ADDENDUM:   In the title of the examination, disregard the CT abdomen and pelvis. Final      XR CHEST 1 VIEW   Final Result   Suspect left pleural effusion. No airspace consolidation. Dual-chamber   pacemaker in place. Follow-up PA and lateral radiographs may be helpful in   further evaluation.          FL MODIFIED BARIUM SWALLOW W VIDEO    (Results Pending)       Assessment:    Active Problems:    Septic shock (HCC)    Hyponatremia    Diarrhea    UTI (urinary tract infection) with pyuria Anemia    Leukocytosis    Acidosis    Hypotension  Resolved Problems:    * No resolved hospital problems. *      Plan:  1. Hypotension (septic shock vs hypovolemia) NICOM noted to not be fluid responsive although hx does suggest volume loss. intensivist following. Pressors per intensivist.   2. sepsis(leukocytosis, RR noted to go up to 21 and 33, infection)POA supportive care and tx underlying infection  3. uti possibly due to catheter treatment started as outpt and continued here for now. ID following. 4. C. Diff colitis, severe abx per ID   Surgery following. Gi following  5. UC less likely but will continue mesalamine per GI. Nadine Sheldon bx reviewed and appears to be idiopathic colitis on bx. D/w gi. Pseudomembranous colitis likely and recommended surgical consult with notation of microperforation  6. Hyponatremia pt given fluids already will continue to closely monitor Na and will ask renal to see. 7. mary renal following  Fluids per renal  8.  leukocytosis possibly multifactorial monitor closely  9. Atelectasis contributing to leukocytosis  Will order IS  10. Anemia will monitor closely may drop given hydration  11. Acidosis/lactic acidosis monitor  12. Hypoalbuminemia edema likely due to third spacing  13. Pleural effusion monitor low albumin may also be contributing  14.  Atrial fib continue sotalol  15. htn stop lisinopril  Monitor bp    Chart reviewed and updated by nursing    Time spent is 35 min        Electronically signed by Maricarmen Alvarado DO on 10/6/2020 at 3:29 PM

## 2020-10-06 NOTE — PROGRESS NOTES
SURGERY  DAILY PROGRESS NOTE  10/6/2020    Chief Complaint:    Chief Complaint   Patient presents with    Hypotension    Diarrhea       Subjective:  Patient states she is feeling better this morning. Off pressors. Denies nausea, vomiting. No BM per nursing    Objective:  BP (!) 119/52   Pulse 74   Temp 98.1 °F (36.7 °C) (Oral)   Resp 25   Ht 5' 6\" (1.676 m)   Wt 205 lb 14.6 oz (93.4 kg)   SpO2 99%   BMI 33.23 kg/m²     GENERAL:  Laying in bed, awake, alert, cooperative, no apparent distress  LUNGS:  No increased work of breathing  CARDIOVASCULAR:  Regular rate   ABDOMEN:  Soft, mildly tender RUQ and RLQ, non distended      Assessment/Plan:  82 y.o. female with septic shock, abdominal exam improving, unlikely fulminant colitis    - Improving, off pressure support  - Continue to monitor abdominal exam  - Continue care as per ICU  - Appreciate consultant recommendations    Electronically signed by Leila Gutiérrez MD on 10/6/2020 at 6:33 AM     Attending Physician Statement:    Chief Complaint:   Chief Complaint   Patient presents with    Hypotension    Diarrhea       I have examined the patient and performed the key aspects of physical exam, reviewed the record (including all pertinent and new radiology images and laboratory findings), and discussed the case with the surgical team.  I agree with the assessment and plan with the following additions, corrections, and changes. 14pt review of symptoms completed and negative except as mentioned. C diff is positive. Abd is benign. WBC 30 from 32. Pressors are off. Severe c diff colitis. She is responding to resuscitation and appropriate abx are now ordered. Given improvement, no need at this time for OR unless fails current management. Continue. Will Follow. Mnig Daley MD  10/06/20  4:44 PM    NOTE: This report, in part or full, may have been transcribed using voice recognition software.  Every effort was made to ensure accuracy; however, inadvertent computerized transcription errors may be present. Please excuse any transcriptional grammatical or spelling errors that may have escaped my editorial review.

## 2020-10-06 NOTE — PROGRESS NOTES
Patient given dose of mesalamine PO followed by sip of thin liquid water. Immediately after, patient begins to cough and gag, stating \"It feels like there is just mucous sitting right here (pointing to mid chest area). \" Coughing and gagging subsides, but patient's pulse ox drops to 91% on room air. Lungs clear in all fields on auscultation. Patient denies any dyspnea at this time and appears to be breathing comfortably. Advised patient's daughter to keep head of bed elevated at this time and that patient will remain NPO until case is discussed with physician. Dr. David Aranda updated on case, including possible aspiration and current H&H. Orders obtained for video swallow study and to trend H&H Q6H. Patient's daughter updated. Patient's pulse ox 89% on room air. Placed on 2L NC. Pulse ox continues to sustain between 89 and 90% on 2L. Oxygen titrated up to 3L NC. Pulse ox initially increases to 93% then rapidly improves to 100%. Will continue to titrate oxygen as patient condition warrants. All additional patient needs met at this time. Will continue to monitor.

## 2020-10-06 NOTE — PROGRESS NOTES
Physician Progress Note      Duane Rubio  CSN #:                  [id number]  :                       1935  ADMIT DATE:       10/4/2020 4:26 PM  [address] [place] DATE:  RESPONDING  PROVIDER #:        VENANCIO CASON DO          QUERY TEXT:    Dear Attending,    Pt admitted with UTI. Pt noted to have chronic indwelling urinary catheter. If possible, please document in the progress notes and discharge summary if   you are evaluating and/or treating any of the following: The medical record reflects the following:  Risk Factors: chronic indwelling urinary catheter  Clinical Indicators: per ED provider note from 10/4, \". Juancarlos Mejía Being treated for UTI   w/ ceftriaxone, has indwelling caraballo. Juancarlos Mejía Leukocytosis UA c/w infection in   setting of caraballo cath, cx sent and pending. Juancarlos Mejía \"; per ID consult on 10/5,   \". Juancarlos Mejía She was being treated for a urinary tract infection with Ceftriaxone. She   has a chronic indwelling Caraballo catheter. The patient says it was placed   because she could not urinate. White count was 21.1 and has gone up to 32.6. She was treated with Vancomycin and Zosyn. Admitted to the ICU with septic   shock. Juancarlos Mejía Septic shock associated to the above. Juancarlos Mejía Cassandra  Treatment: IV Zosyn; IV Rocephin;  Options provided:  -- UTI due to chronic indwelling urinary catheter  -- UTI not due to indwelling urinary catheter  -- Other - I will add my own diagnosis  -- Disagree - Not applicable / Not valid  -- Disagree - Clinically unable to determine / Unknown  -- Refer to Clinical Documentation Reviewer    PROVIDER RESPONSE TEXT:    Please see progress note    Query created by: Fab Trevino on 10/5/2020 3:45 PM      Electronically signed by:  Tapan Leonard DO 10/5/2020 10:21 PM

## 2020-10-06 NOTE — PLAN OF CARE
Problem: Skin Integrity:  Goal: Will show no infection signs and symptoms  Description: Will show no infection signs and symptoms  10/6/2020 0914 by Jameel Ramos RN  Outcome: Met This Shift  10/6/2020 0433 by Maria Elena Christiansen RN  Outcome: Not Met This Shift  Goal: Absence of new skin breakdown  Description: Absence of new skin breakdown  10/6/2020 0433 by Maria Elena Christiansen RN  Outcome: Met This Shift     Problem: Falls - Risk of:  Goal: Will remain free from falls  Description: Will remain free from falls  10/6/2020 0914 by Jameel Ramos RN  Outcome: Met This Shift  10/6/2020 0433 by Maria Elena Christiansen RN  Outcome: Met This Shift  Goal: Absence of physical injury  Description: Absence of physical injury  10/6/2020 0433 by Maria Elena Christiansen RN  Outcome: Met This Shift     Problem: Diarrhea:  Goal: Bowel elimination is within specified parameters  Description: Bowel elimination is within specified parameters  Outcome: Not Met This Shift  Goal: Passage of soft, formed stool  Description: Passage of soft, formed stool  Outcome: Not Met This Shift  Goal: Establishment of normal bowel function will improve to within specified parameters  Description: Establishment of normal bowel function will improve to within specified parameters  Outcome: Not Met This Shift

## 2020-10-06 NOTE — PROGRESS NOTES
quadrants. Extremities: No clubbing, no cyanosis, no edema. Lines: Right IJ TLC 10/4/2020. Martin catheter with clear urine. Laboratory and Tests Review:  Lab Results   Component Value Date    WBC 30.2 (H) 10/06/2020    WBC 32.6 (H) 10/05/2020    WBC 21.1 (H) 10/04/2020    HGB 7.0 (L) 10/06/2020    HCT 21.0 (L) 10/06/2020    MCV 86.8 10/06/2020     10/06/2020     Lab Results   Component Value Date    NEUTROABS 27.78 (H) 10/06/2020    NEUTROABS 26.74 (H) 10/05/2020    NEUTROABS 15.61 (H) 10/04/2020     No results found for: [facility]  Lab Results   Component Value Date    ALT 8 10/06/2020    AST 10 10/06/2020    ALKPHOS 91 10/06/2020    BILITOT 0.7 10/06/2020     Lab Results   Component Value Date     10/06/2020    K 3.8 10/06/2020    K 3.8 10/06/2020    CL 97 10/06/2020    CO2 17 10/06/2020    BUN 42 10/06/2020    CREATININE 1.4 10/06/2020    CREATININE 1.5 10/06/2020    CREATININE 1.6 10/05/2020    GFRAA 43 10/06/2020    LABGLOM 36 10/06/2020    GLUCOSE 86 10/06/2020    PROT 4.5 10/06/2020    LABALBU 2.6 10/06/2020    CALCIUM 7.5 10/06/2020    BILITOT 0.7 10/06/2020    ALKPHOS 91 10/06/2020    AST 10 10/06/2020    ALT 8 10/06/2020     Lab Results   Component Value Date    CRP 14.3 (H) 10/06/2020    CRP 17.5 (H) 10/05/2020     Lab Results   Component Value Date    SEDRATE 6 10/06/2020    SEDRATE 10 10/05/2020     Radiology:  Ultrasound reviewed.   Positive Hopson sign but no gallbladder thickening    Microbiology:   Nares screen for MRSA: Negative  Urine culture 10/4/2020 <10,000 E. coli, Corynebacterium, yeast  Blood cultures 10/4/2020: Negative so far  Stools for C. difficile: Positive    Recent Labs     10/05/20  0600   PROCAL 30.06*       ASSESSMENT:  · Acute C. difficile infection with colitis  · Leukocytosis associated to the above  · Possible UTI, being treated with Ceftriaxone at the nursing facility  · Acute kidney injury    PLAN:  · Discontinue Cefepime  · Add Metronidazole  · Start oral Vancomycin  · Stools for C. difficile  · Monitor labs    Discussed with ICU team    Luciano Sosa  4:05 PM  10/6/2020

## 2020-10-06 NOTE — FLOWSHEET NOTE
Intensive Care Daily Quality Rounding Checklist        ICU Team Members: Dr. Santa Rascon, Jasen Mcdowell, Jacklyn Mendez (residents), clinical pharmacist, charge nurse, bedside nurse, respiratory therapist     ICU Day #: 2     Intubation Date:  n/a     Ventilator Day #: n/a     Central Line Insertion Date: October 4,2020                                                    Day #: NUMBER: 3      Arterial Line Insertion Date:  n/a                             Day #: n/a     DVT Prophylaxis: Lovenox    GI Prophylaxis: protonix     Martin Catheter Insertion Date: October 4,2020                                        Day #: 3                           Continued need (if yes, reason documented and discussed with physician): yes, strict Is and Os     Skin Issues/ Wounds and ordered treatment discussed on rounds: no issues     Goals/ Plans for the Day: Daily labs replace e- as needed, awaiting stool studies for r/o cdiff, replace bicarb, check a KUB, stop steroids      Revision History

## 2020-10-06 NOTE — PROGRESS NOTES
SPEECH LANGUAGE PATHOLOGY  DAILY PROGRESS NOTE        PATIENT NAME:  Chesley Sever      :  1935          TODAY'S DATE:  10/6/2020 ROOM:  0217/0217-A        SWALLOWING    Pt in bed, daughter present initially. Nursing reports pt continues to demonstrate inconsistent difficulty swallowing. Oral trial of puree and thin liquid per cup completed. Eructation noted after most thin liquid swallows and following puree X1. Pt says liquid and food are getting stuck while pointing to mid esophagus. Pt reports this is not new during this hospital stay. Delayed coughing noted during evaluation. Video swallow to be completed tomorrow to r/o aspiration. May recommend esophagram following depending on results    DYSPHAGIA DIAGNOSIS:  Oropharyngeal dysphagia                            DIET RECOMMENDATIONS:  NPO until video swallow eval completed                   FEEDING RECOMMENDATIONS:                              Assistance level: Not applicable                              Compensatory strategies recommended:no applicable         Oral- adequate labial seal and A-P transfer, no oral residue      Pharyngeal- delayed cough approximately 10 minutes into evalution,  multiple swallows noted, frequent eructation noted      Education- Pt educated on results and POC. Questions answered. Will continue SP intervention as per previously established POC. Radha JALLOH CCC/SLP [id number]  Speech Pathologist              CPT code(s) 80229  dysphagia tx  Total minutes :  30 minutes

## 2020-10-06 NOTE — PROGRESS NOTES
Physical Therapy    Facility/Department: [facility] ICU  Initial Assessment    NAME: Liliane Valiente  : 1935  MRN: [id number]    Date of Service: 10/6/2020       REQUIRES PT FOLLOW UP: Yes       Patient Diagnosis(es): The primary encounter diagnosis was Septic shock ([facility] [address] A diagnosis of UGIB (upper gastrointestinal bleed) was also pertinent to this visit. has a past medical history of Anemia, Atrial fibrillation ([facility] [address] Colitis, and Hypertension. has no past surgical history on file. Evaluating Therapist: Lluvia Wills, PT     Referring Provider:  Dr. Natanael Guevara #:  [id number]    DIAGNOSIS: septic shock   PRECAUTIONS: falls, O2, contact isolation     Social:  Pt admitted from SNF   Prior to admission: pt reports she needed assist with transfers and has been unable to walk      Initial Evaluation  Date:  10/6/2020 Treatment      Short Term/ Long Term   Goals   Was pt agreeable to Eval/treatment? yes      Does pt have pain? R side pain      Bed Mobility  Rolling:  Max assist   Supine to sit:  Dep assist to long sit   Sit to supine:  NT  Scooting:  Dep assist    max assist   Transfers Sit to stand: Nt    max assist x 2    Ambulation   NT   N/A    Sitting balance  Dep in long sit   Mod assist sitting EOB    LE ROM  AAROM WFL      LE strength  2- to 3+/ 5   Increase by 1-2/ 3 MMT grade    AM- PAC RAW score  7/ 24            Pt is alert and Oriented x  3, some confusion noted throughout eval      Balance: dep assist for long sitting   Sensation: reports B feet are numb  Endurance: poor        ASSESSMENT  Pt displays functional ability as noted in the objective portion of this evaluation. Treatment/Education:    Mobility as above. RN approved eval. Pt refused attempting to sit EOB. Reports R side pain and weakness. Dep assist for long sit.    Performed supine AAROM APS, QS, SAQs, and heel slides 20 x 1     Pt educated on fall risk,  LE exercises, importance of mobility        Patient response to education:   Pt verbalized understanding Pt demonstrated skill Pt requires further education in this area   x Needs cues /assist   x       Comments:  Pt left  In bed after session, with call light in reach. Rehab potential is Good for reaching above PT goals. Pts/ family goals   1. None stated     Patient and or family understand(s) diagnosis, prognosis, and plan of care. -  Yes ? PLAN  PT care will be provided in accordance with the objectives noted above. Whenever appropriate, clear delegation orders will be provided for nursing staff. Exercises and functional mobility practice will be used as well as appropriate assistive devices or modalities to obtain goals. Patient and family education will also be administered as needed. PLAN OF CARE:    Current Treatment Recommendations     [x] Strengthening     [] ROM   [x] Balance Training   [x] Endurance Training   [x] Transfer Training   [x] Gait Training   [] Stair Training   [x] Positioning   [x] Safety and Education Training   [x] Patient/Caregiver Education   [] HEP  [] Other       Frequency of treatments will be 2-5x/week x  7-14 days. Time in: 1450   Time out: 1510      Evaluation Time includes thorough review of current medical information, gathering information on past medical history/social history and prior level of function, completion of standardized testing/informal observation of tasks, assessment of data and education on plan of care and goals.     CPT codes:  [x] Low Complexity PT evaluation 07345  [] Moderate Complexity PT evaluation 78380  [] High Complexity PT evaluation 40958  [] PT Re-evaluation 76132  [] Gait training 66861  minutes  [] Therapeutic activities 97184  minutes  [x] Therapeutic exercises 58972 10 minutes  [] Neuromuscular reeducation 88831  minutes       Aaron Verduzco number:  PT [id number]

## 2020-10-06 NOTE — PROGRESS NOTES
Critical Care Team - Daily Progress Note      Date and time: 10/6/2020 9:26 AM  Patient's name:  Yoshi Schreiber  Medical Record Number: [id number]  Patient's account/billing number: [de-identified]  Patient's YOB: 1935  Age: 82 y.o. Date of Admission: 10/4/2020  4:26 PM  Length of stay during current admission: 2      Primary Care Physician: Derick Pardo MD  ICU Attending Physician: Dr. Neelam Otto Status: Limited    Reason for ICU admission: Hypotension      SUBJECTIVE:   The patient is a 82 y.o. female with significant past medical history of A-fib, HTN, and anemia that presented to the ER due to hypotension and diarrhea. Patient was recently diagnosed with UTI and was placed on antibiotics. Patient has been having diarrhea for a few weeks. Patient has been having diffuse cramping abdominal pain with no radiation. Patient was found to be guaiac positive in the ER. Patient was admitted to the ICU due to hypotension    OVERNIGHT EVENTS:         10/06/2020  No acute events overnight. Off pressors, off oxygen. Patient states that the abdominal pain has improved. Per nurse, no diarrhea since admission. Patient states that she is at her baseline mentation.      AWAKE & FOLLOWING COMMANDS:  [] No   [x] Yes    CURRENT VENTILATION STATUS:     [] Ventilator  [] BIPAP  [] Nasal Cannula [x] Room Air      IF INTUBATED, ET TUBE MARKING AT LOWER LIP:       cms    SECRETIONS Amount:  [] Small [] Moderate  [] Large  [x] None  Color:     [] White [] Colored  [] Bloody    SEDATION:  RAAS Score:  [] Propofol gtt  [] Versed gtt  [] Ativan gtt   [x] No Sedation    PARALYZED:  [x] No    [] Yes    DIARRHEA:                [x] No                [] Yes  (C. Difficile status: [] positive                                                                                                                       [] negative [] pending)    VASOPRESSORS:  [x] No    [] Yes    If yes -   [] Levophed       [] Dopamine     [] Vasopressin       [] Dobutamine  [] Phenylephrine         [] Epinephrine    CENTRAL LINES:     [] No   [x] Yes   (Date of Insertion:  10/04 )           If yes -     [x] Right IJ     [] Left IJ [] Right Femoral [] Left Femoral                   [] Right Subclavian [] Left Subclavian       WEST'S CATHETER:   [] No   [x] Yes  (Date of Insertion: 10/04  )     URINE OUTPUT:            [] Good   [] Low              [] Anuric      OBJECTIVE:     VITAL SIGNS:  BP (!) 115/55   Pulse 78   Temp 98.1 °F (36.7 °C) (Oral)   Resp 18   Ht 5' 6\" (1.676 m)   Wt 205 lb 14.6 oz (93.4 kg)   SpO2 98%   BMI 33.23 kg/m²   Tmax over 24 hours:  Temp (24hrs), Av.1 °F (36.7 °C), Min:97.4 °F (36.3 °C), Max:98.4 °F (36.9 °C)      Patient Vitals for the past 6 hrs:   BP Temp Temp src Pulse Resp SpO2 Weight   10/06/20 0800 (!) 115/55 -- -- 78 18 98 % --   10/06/20 0700 (!) 114/54 -- -- 78 25 97 % --   10/06/20 0600 (!) 119/52 -- -- 74 25 99 % 205 lb 14.6 oz (93.4 kg)   10/06/20 0500 (!) 109/54 -- -- 72 18 98 % --   10/06/20 0400 (!) 107/54 98.1 °F (36.7 °C) Oral 73 24 98 % --         Intake/Output Summary (Last 24 hours) at 10/6/2020 0926  Last data filed at 10/6/2020 0815  Gross per 24 hour   Intake 3833.7 ml   Output 577 ml   Net 3256.7 ml     Wt Readings from Last 2 Encounters:   10/06/20 205 lb 14.6 oz (93.4 kg)     Body mass index is 33.23 kg/m². PHYSICAL EXAMINATION:    Physical Exam  Vitals signs reviewed. Constitutional:       Appearance: Normal appearance. She is not ill-appearing. HENT:      Head: Normocephalic. Mouth/Throat:      Mouth: Mucous membranes are moist.   Eyes:      Conjunctiva/sclera: Conjunctivae normal.      Pupils: Pupils are equal, round, and reactive to light. Cardiovascular:      Rate and Rhythm: Normal rate and regular rhythm. Heart sounds: Normal heart sounds. No murmur. Pulmonary:      Effort: Pulmonary effort is normal.      Breath sounds: Normal breath sounds. No wheezing. Abdominal:      General: Bowel sounds are normal.      Palpations: Abdomen is soft. Tenderness: There is abdominal tenderness in the right upper quadrant, right lower quadrant and left lower quadrant. Musculoskeletal:      Right lower le+ Pitting Edema present. Left lower le+ Pitting Edema present. Skin:     General: Skin is warm. Capillary Refill: Capillary refill takes less than 2 seconds. Coloration: Skin is not pale. Neurological:      Mental Status: She is alert. Mental status is at baseline. Psychiatric:         Mood and Affect: Mood normal.         Behavior: Behavior normal.         Thought Content:  Thought content normal.         Judgment: Judgment normal.         Any additional physical findings:    MEDICATIONS:    Scheduled Meds:   medicated lip balm        pantoprazole  40 mg Intravenous Daily    enoxaparin  30 mg Subcutaneous Daily    hydrocortisone sodium succinate PF  100 mg Intravenous Q8H    albumin human  25 g Intravenous Q6H    amitriptyline  10 mg Oral Nightly    calcium carbonate  500 mg Oral Daily    ferrous sulfate  325 mg Oral Daily with breakfast    melatonin  6 mg Oral Daily    mesalamine  500 mg Oral 4x Daily    sotalol  120 mg Oral BID    sodium chloride flush  10 mL Intravenous 2 times per day     Continuous Infusions:   norepinephrine Stopped (10/06/20 0600)    sodium chloride 100 mL/hr at 10/05/20 2318     PRN Meds:   acetaminophen, 650 mg, Q6H PRN  ondansetron, 4 mg, Q6H PRN  sodium chloride flush, 10 mL, PRN  acetaminophen, 650 mg, Q6H PRN    Or  acetaminophen, 650 mg, Q6H PRN  senna, 1 tablet, Daily PRN          VENT SETTINGS (Comprehensive) (if applicable):  Vent Information  SpO2: 98 %  Additional Respiratory  Assessments  Pulse: 78  Resp: 18  SpO2: 98 %  Oral Care: Mouth swabbed, Lip moisturizer applied    ABGs:     No results for input(s): PH, PCO2, PO2, HCO3, BE, O2SAT in the last 72 hours.       Laboratory findings:    Complete Blood Count:   Recent Labs     10/04/20  1708 10/05/20  0600 10/06/20  0605   WBC 21.1* 32.6* 30.2*   HGB 10.0* 10.1* 7.3*   HCT 29.8* 31.3* 22.3*    364 207        Last 3 Blood Glucose:   Recent Labs     10/05/20  1748 10/06/20  0045 10/06/20  0605   GLUCOSE 95 94 86        PT/INR:    Lab Results   Component Value Date    PROTIME 16.6 10/04/2020    INR 1.4 10/04/2020     PTT:    Lab Results   Component Value Date    APTT 25.0 10/04/2020       Comprehensive Metabolic Profile:   Recent Labs     10/05/20  1748 10/06/20  0045 10/06/20  0605   * 126* 127*   K 4.4 4.3 3.8   CL 94* 98 97*   CO2 18* 18* 17*   BUN 40* 42* 42*   CREATININE 1.6* 1.5* 1.4*   GLUCOSE 95 94 86   CALCIUM 7.1* 7.0* 7.5*   PROT 4.2*  --  4.5*   LABALBU 1.9*  --  2.6*   BILITOT 0.6  --  0.7   ALKPHOS 126*  --  91   AST 11  --  10   ALT 10  --  8      Magnesium:   Lab Results   Component Value Date    MG 2.1 10/06/2020     Phosphorus:   Lab Results   Component Value Date    PHOS 3.9 10/06/2020     Ionized Calcium: No results found for: CAION     Urinalysis:     Troponin:   Recent Labs     10/04/20  1708   TROPONINI <0.01       Microbiology:    Cultures during this admission:     Blood cultures:                 [] None drawn      [x] Negative             []  Positive (Details:  )  Urine Culture:                   [] None drawn      [x] Negative             []  Positive (Details:  )  Sputum Culture:               [x] None drawn       [] Negative             []  Positive (Details:  )   Endotracheal aspirate:     [x] None drawn       [] Negative             []  Positive (Details:  )     Other pertinent Labs:       Radiology/Imaging:   Ct Abdomen Pelvis W Iv Contrast Additional Contrast? None    Result Date: 10/4/2020  EXAMINATION: CT OF THE ABDOMEN AND PELVIS WITH CONTRAST 10/4/2020 7:53 pm TECHNIQUE: CT of the abdomen and pelvis was performed with the administration of intravenous contrast. Multiplanar reformatted images are provided for review. Dose modulation, iterative reconstruction, and/or weight based adjustment of the mA/kV was utilized to reduce the radiation dose to as low as reasonably achievable. COMPARISON: None. HISTORY: ORDERING SYSTEM PROVIDED HISTORY: abd pain, hypotension, gi bleed TECHNOLOGIST PROVIDED HISTORY: Reason for exam:->abd pain, hypotension, gi bleed Additional Contrast?->None FINDINGS: The lung bases demonstrate cardiomegaly with a tiny pericardial effusion. There is minimal atelectasis and pleural effusion in the lung bases left more than right. The liver is fatty infiltrated. The gallbladder is distended without acute inflammation. Consider ultrasonography for better assessment of gallbladder. Spleen, pancreas, the adrenals and the kidneys are normal except for a 1.5 cm cystic lesion in the right kidney. There is degenerative changes in the lumbar spine. .  The urinary bladder is contracted with a Martin catheter and wall thickening. There is diffuse thickening and inflammation of the transverse colon, descending and rectosigmoid colon with hyperdensity concerning for active bleeding/contrast extravasation. There is inflammatory changes in the presacral area. A tiny air bubble is identified in the rectovesical pouch which could represent waled off microperforation. Appendix cannot be identified. There is some inflammatory changes in the gluteus region     Diffuse inflammation of the transverse colon, descending and rectosigmoid colon with wall thickening and edema with active contrast extravasation/bleeding. Hemorrhagic diverticulitis or colitis are considered. There is some edema in the presacral area and tiny amount of air collection in the rectovesical pouch. Walled-off microperforation is considered. Distended gallbladder. Atelectasis/pleural effusion in the left lung base.  RECOMMENDATIONS: The clinical service was notified     Ct Abdomen Pelvis W Iv Contrast Additional Contrast? None    Addendum Date: 10/4/2020    ADDENDUM: In the title of the examination, disregard the CT abdomen and pelvis. Result Date: 10/4/2020  EXAMINATION: ONE XRAY VIEW OF THE CHEST; CT OF THE ABDOMEN AND PELVIS WITH CONTRAST 10/4/2020 7:06 pm COMPARISON: October 4, 2020 HISTORY: ORDERING SYSTEM PROVIDED HISTORY: post R IJ CVC placement TECHNOLOGIST PROVIDED HISTORY: Reason for exam:->post R IJ CVC placement     There is a borderline cardiac size. There is atelectasis in the lung bases with a small left pleural effusion. Left subclavian pacemaker is unchanged. Right IJ central line is noted with the tip at the atrial caval junction. There is no pneumothorax. RECOMMENDATION: Atelectasis and pleural effusion in the lung bases more on the left side. Right IJ central line has noted without complications. Us Gallbladder Ruq    Result Date: 10/6/2020  EXAMINATION: RIGHT UPPER QUADRANT ULTRASOUND 10/5/2020 10:46 pm COMPARISON: CT abdomen pelvis 10/04/2020 HISTORY: ORDERING SYSTEM PROVIDED HISTORY: Elevated alk phos, LUQ pain TECHNOLOGIST PROVIDED HISTORY: Reason for exam:->Elevated alk phos, LUQ pain What reading provider will be dictating this exam?->CRC FINDINGS: LIVER:  Normal liver size and contour. Increased parenchymal echogenicity. No focal lesion. BILIARY SYSTEM:  No intra or extrahepatic bile duct dilatation. Common bile duct measures 5 mm. Gallbladder contains shadowing dependent gallstones. No wall thickening or pericholecystic fluid. Reported positive sonographic Hopson's sign. RIGHT KIDNEY: Right kidney measures 10.1 x 4.5 x 5.2 cm. Anechoic cyst at the superior pole measures up to 15 mm. An echogenic mid polar region focus measures 9 x 9 x 9 mm and correlates with hypodensity on CT, probably a tiny angiomyolipoma. PANCREAS:  Visualized portions of the pancreas are unremarkable.  OTHER: Right pleural effusions suspected. No ascites. Cholelithiasis with positive sonographic Hopson's sign but no wall thickening or pericholecystic inflammatory changes, altogether equivocal for cholecystitis. Consider functional analysis with nuclear medicine HIDA scan. Hepatic steatosis. 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 Suspected small right pleural effusion. Xr Chest Portable    Addendum Date: 10/4/2020    ADDENDUM: In the title of the examination, disregard the CT abdomen and pelvis. Result Date: 10/4/2020  EXAMINATION: ONE XRAY VIEW OF THE CHEST; CT OF THE ABDOMEN AND PELVIS WITH CONTRAST 10/4/2020 7:06 pm COMPARISON: October 4, 2020 HISTORY: ORDERING SYSTEM PROVIDED HISTORY: post R IJ CVC placement TECHNOLOGIST PROVIDED HISTORY: Reason for exam:->post R IJ CVC placement     There is a borderline cardiac size. There is atelectasis in the lung bases with a small left pleural effusion. Left subclavian pacemaker is unchanged. Right IJ central line is noted with the tip at the atrial caval junction. There is no pneumothorax. RECOMMENDATION: Atelectasis and pleural effusion in the lung bases more on the left side. Right IJ central line has noted without complications. Xr Chest 1 View    Result Date: 10/4/2020  EXAMINATION: ONE XRAY VIEW OF THE CHEST 10/4/2020 4:06 pm COMPARISON: None. HISTORY: ORDERING SYSTEM PROVIDED HISTORY: fatigue TECHNOLOGIST PROVIDED HISTORY: Reason for exam:->fatigue FINDINGS: Cardiac silhouette is not enlarged. Dual-chamber pacemaker in place. Pulmonary vasculature within normal limits. Blunting of the left costophrenic angle and opacity along the left costal margin most likely small left pleural effusion. No airspace consolidation. Suspect left pleural effusion. No airspace consolidation. Dual-chamber pacemaker in place. Follow-up PA and lateral radiographs may be helpful in further evaluation.         Chest Xray (10/6/2020): none    RUQ US  Cholelithiasis with positive sonographic Hopson's sign but no wall thickening    or pericholecystic inflammatory changes, altogether equivocal for    cholecystitis.  Consider functional analysis with nuclear medicine HIDA scan.         Hepatic steatosis.         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         Suspected small right pleural effusion. ASSESSMENT:     SYSTEMS ASSESSMENT     Neuro   Awake, alert and oriented to person and time, not place  Very hard of hearing      Respiratory   No acute issues  On 3 L NC  Wean oxygen as tolerated.  Keep O2 sat 90-92%     Cardiovascular   Hx of HTN and A-fib rhythm controlled               -Hold Lisinopril 40 mg QD due to hypotension              -Continue Sotalol 120 mg BID              -Monitor Qtc      Septic Shock  Hypotension resolved               -Hold Antihypertensives               -Off Levophed              -Stopped Solu-cortef     DVT prophylaxis- Lovenox     Gastrointestinal   Hx of Ulcerative Colitis              -GI following- continue Pentasa, stopped Bentyl    -General surgery following- no surgery     RUQ abdominal pain   -Alk Phos elevated    -RUQ showed cholelithiasis with positive hopson's sign, but no wall thickening or pericholecystic inflammatory changes   -General surgery following      Protonix PPx     Renal   QI               -Cre improving; Cre 1.5 today               -Continue IVF @ 100 mL/hour    -FeUrea 7.5%-pre-renal              -Nephrology following     Hypovolemic Hyponatremia              -Sodium improving slowly, Na 127 today              -1 L NS bolus ordered               -Continue IVF @ 100 mL/hour    -Nephrology following    Metabolic acidosis  Likely due to diarrhea   -Give 2 amps of bicarb        Infectious Disease   Septic Shock due to UTI  Improving              -WBC decreasing; Wbc 30.2    -Off pressors               -Given Zosyn x1, Cefepime x1               -ID following     Diarrhea  Hx of Ulcerative colitis -C-diff ordered     -Diarrhea this AM    -KUB this AM     Hematology/Oncology   Anemia  Likely multi-factorial- GI bleed, anemia of chronic disease              -Hgb 7.3              -Transfuse < 7               -CBC daily     -H&H this afternoon              -Continue to monitor               -GI following     Endocrine   No acute issues  Keep blood glucose < 180     Social/Spiritual/DNR/Other   Limited code  Advance diet to clear liquid diet     Lines:  CVC RIJ 10/04/2020- day 3  Martin 10/04/2020- day 3        PLAN:     WEAN PER PROTOCOL:  [] No   [] Yes  [x] N/A    DISCONTINUE ANY LABS:   [x] No   [] Yes    ICU PROPHYLAXIS:  Stress ulcer:  [] PPI Agent  [] [id number] [] Sucralfate  [] Other:  VTE:   [] Enoxaparin  [] Unfract. Heparin Subcut  [] EPC Cuffs    NUTRITION:  [] NPO [] Tube Feeding (Specify: ) [] TPN  [x] PO (Diet: DIET CLEAR LIQUID;)    HOME MEDICATIONS RECONCILED: [x] No  [] Yes    INSULIN DRIP:   [x] No   [] Yes    CONSULTATION NEEDED:  [x] No   [] Yes    FAMILY UPDATED:    [x] No   [] Yes    TRANSFER OUT OF ICU:   [x] No   [] Yes    ADDITIONAL PLAN:        Nathaniel Cuenca MD, PGY-2                       10/6/2020, 9:26 AM     I personally saw, examined and provided care for the patient. Radiographs, labs and medication list were reviewed by me independently. I spoke with bedside nursing, therapists and consultants. Critical care services and times documented are independent of procedures and multidisciplinary rounds with Residents. Additionally comprehensive, multidisciplinary rounds were conducted with the MICU team. The case was discussed in detail and plans for care were established. Review of Residents documentation was conducted and revisions were made as appropriate. I agree with the above documented exam, problem list and plan of care.     Follow up c.diff cx  Start oral vanco  ivf   Lactic acid improved doubt there is any indication for surgery   Ok for step down    Tereso Cornelius M.D. Pulmonary/Critical Care Medicine   35 min cct excluding procedures

## 2020-10-06 NOTE — CARE COORDINATION
COVID negative 10/4. On room air. Iv pressor weaned off this am. Limited code status. From [place] PTA- plan remains to return to [place] on discharge- will need precert- awaiting PT/OT evals.  Will follow Stephen Coyle

## 2020-10-06 NOTE — PLAN OF CARE
Problem: Skin Integrity:  Goal: Absence of new skin breakdown  Description: Absence of new skin breakdown  Outcome: Met This Shift     Problem: Falls - Risk of:  Goal: Will remain free from falls  Description: Will remain free from falls  Outcome: Met This Shift  Goal: Absence of physical injury  Description: Absence of physical injury  Outcome: Met This Shift     Problem: Skin Integrity:  Goal: Will show no infection signs and symptoms  Description: Will show no infection signs and symptoms  Outcome: Not Met This Shift

## 2020-10-07 ENCOUNTER — APPOINTMENT (OUTPATIENT)
Dept: GENERAL RADIOLOGY | Age: 85
DRG: 853 | End: 2020-10-07
Payer: MEDICARE

## 2020-10-07 ENCOUNTER — ANESTHESIA EVENT (OUTPATIENT)
Dept: OPERATING ROOM | Age: 85
DRG: 853 | End: 2020-10-07
Payer: MEDICARE

## 2020-10-07 ENCOUNTER — ANESTHESIA (OUTPATIENT)
Dept: OPERATING ROOM | Age: 85
DRG: 853 | End: 2020-10-07
Payer: MEDICARE

## 2020-10-07 VITALS
RESPIRATION RATE: 8 BRPM | TEMPERATURE: 95.9 F | SYSTOLIC BLOOD PRESSURE: 130 MMHG | OXYGEN SATURATION: 89 % | DIASTOLIC BLOOD PRESSURE: 58 MMHG

## 2020-10-07 LAB
ALBUMIN SERPL-MCNC: 3.2 G/DL (ref 3.5–5.2)
ALBUMIN SERPL-MCNC: 3.3 G/DL (ref 3.5–5.2)
ALP BLD-CCNC: 77 U/L (ref 35–104)
ALP BLD-CCNC: 78 U/L (ref 35–104)
ALT SERPL-CCNC: 7 U/L (ref 0–32)
ALT SERPL-CCNC: 8 U/L (ref 0–32)
ANION GAP SERPL CALCULATED.3IONS-SCNC: 10 MMOL/L (ref 7–16)
ANION GAP SERPL CALCULATED.3IONS-SCNC: 14 MMOL/L (ref 7–16)
ANION GAP SERPL CALCULATED.3IONS-SCNC: 14 MMOL/L (ref 7–16)
ANISOCYTOSIS: ABNORMAL
APTT: 58.6 SEC (ref 24.5–35.1)
AST SERPL-CCNC: 10 U/L (ref 0–31)
AST SERPL-CCNC: 10 U/L (ref 0–31)
B.E.: -15.6 MMOL/L (ref -3–0)
BASOPHILS ABSOLUTE: 0.02 E9/L (ref 0–0.2)
BASOPHILS ABSOLUTE: 0.21 E9/L (ref 0–0.2)
BASOPHILS RELATIVE PERCENT: 0.1 % (ref 0–2)
BASOPHILS RELATIVE PERCENT: 1 % (ref 0–2)
BILIRUB SERPL-MCNC: 1 MG/DL (ref 0–1.2)
BILIRUB SERPL-MCNC: 1.4 MG/DL (ref 0–1.2)
BLOOD BANK DISPENSE STATUS: NORMAL
BLOOD BANK PRODUCT CODE: NORMAL
BPU ID: NORMAL
BUN BLDV-MCNC: 34 MG/DL (ref 8–23)
BUN BLDV-MCNC: 36 MG/DL (ref 8–23)
BUN BLDV-MCNC: 36 MG/DL (ref 8–23)
BURR CELLS: ABNORMAL
CALCIUM SERPL-MCNC: 7.3 MG/DL (ref 8.6–10.2)
CALCIUM SERPL-MCNC: 7.5 MG/DL (ref 8.6–10.2)
CALCIUM SERPL-MCNC: 8.1 MG/DL (ref 8.6–10.2)
CHLORIDE BLD-SCNC: 104 MMOL/L (ref 98–107)
CHLORIDE BLD-SCNC: 105 MMOL/L (ref 98–107)
CHLORIDE BLD-SCNC: 110 MMOL/L (ref 98–107)
CO2: 17 MMOL/L (ref 22–29)
CO2: 19 MMOL/L (ref 22–29)
CO2: 19 MMOL/L (ref 22–29)
CREAT SERPL-MCNC: 0.9 MG/DL (ref 0.5–1)
CRITICAL NOTIFICATION: YES
CRYPTOSPORIDIUM ANTIGEN STOOL: NORMAL
DESCRIPTION BLOOD BANK: NORMAL
DEVICE: ABNORMAL
DOHLE BODIES: ABNORMAL
DOHLE BODIES: ABNORMAL
EOSINOPHILS ABSOLUTE: 0 E9/L (ref 0.05–0.5)
EOSINOPHILS ABSOLUTE: 0.21 E9/L (ref 0.05–0.5)
EOSINOPHILS RELATIVE PERCENT: 0 % (ref 0–6)
EOSINOPHILS RELATIVE PERCENT: 1 % (ref 0–6)
FERRITIN: 323 NG/ML
FIO2 ARTERIAL: 40
GFR AFRICAN AMERICAN: >60
GFR NON-AFRICAN AMERICAN: 60 ML/MIN/1.73
GLUCOSE BLD-MCNC: 124 MG/DL (ref 74–99)
GLUCOSE BLD-MCNC: 73 MG/DL (ref 74–99)
GLUCOSE BLD-MCNC: 84 MG/DL (ref 74–99)
HCO3 ARTERIAL: 8.6 MMOL/L (ref 22–26)
HCT VFR BLD CALC: 20.8 % (ref 34–48)
HCT VFR BLD CALC: 21.4 % (ref 34–48)
HCT VFR BLD CALC: 23.3 % (ref 34–48)
HCT VFR BLD CALC: 36.8 % (ref 34–48)
HEMOGLOBIN: 11.8 G/DL (ref 11.5–15.5)
HEMOGLOBIN: 6.7 G/DL (ref 11.5–15.5)
HEMOGLOBIN: 7 G/DL (ref 11.5–15.5)
HEMOGLOBIN: 7.6 G/DL (ref 11.5–15.5)
HYPOCHROMIA: ABNORMAL
HYPOCHROMIA: ABNORMAL
IMMATURE GRANULOCYTES #: 0.89 E9/L
IMMATURE GRANULOCYTES %: 4.1 % (ref 0–5)
INR BLD: 1.6
IRON SATURATION: 64 % (ref 15–50)
IRON: 16 MCG/DL (ref 37–145)
LACTIC ACID: 1.1 MMOL/L (ref 0.5–2.2)
LACTIC ACID: 1.4 MMOL/L (ref 0.5–2.2)
LACTIC ACID: 2.6 MMOL/L (ref 0.5–2.2)
LYMPHOCYTES ABSOLUTE: 1.31 E9/L (ref 1.5–4)
LYMPHOCYTES ABSOLUTE: 1.5 E9/L (ref 1.5–4)
LYMPHOCYTES RELATIVE PERCENT: 6 % (ref 20–42)
LYMPHOCYTES RELATIVE PERCENT: 7 % (ref 20–42)
MAGNESIUM: 2.1 MG/DL (ref 1.6–2.6)
MCH RBC QN AUTO: 27.7 PG (ref 26–35)
MCH RBC QN AUTO: 29 PG (ref 26–35)
MCH RBC QN AUTO: 29.4 PG (ref 26–35)
MCHC RBC AUTO-ENTMCNC: 32.1 % (ref 32–34.5)
MCHC RBC AUTO-ENTMCNC: 32.2 % (ref 32–34.5)
MCHC RBC AUTO-ENTMCNC: 32.6 % (ref 32–34.5)
MCV RBC AUTO: 86 FL (ref 80–99.9)
MCV RBC AUTO: 88.9 FL (ref 80–99.9)
MCV RBC AUTO: 91.8 FL (ref 80–99.9)
METAMYELOCYTES RELATIVE PERCENT: 3 % (ref 0–1)
MODE: AC
MONOCYTES ABSOLUTE: 0.21 E9/L (ref 0.1–0.95)
MONOCYTES ABSOLUTE: 0.43 E9/L (ref 0.1–0.95)
MONOCYTES RELATIVE PERCENT: 1 % (ref 2–12)
MONOCYTES RELATIVE PERCENT: 2 % (ref 2–12)
MYELOCYTE PERCENT: 2 % (ref 0–0)
NEUTROPHILS ABSOLUTE: 19.08 E9/L (ref 1.8–7.3)
NEUTROPHILS ABSOLUTE: 19.26 E9/L (ref 1.8–7.3)
NEUTROPHILS RELATIVE PERCENT: 85 % (ref 43–80)
NEUTROPHILS RELATIVE PERCENT: 87.8 % (ref 43–80)
O2 SATURATION: 99.6 % (ref 92–98.5)
OPERATOR ID: 1023
PATIENT TEMP: 37
PCO2 (TEMP CORRECTED): 15.9 MMHG (ref 35–45)
PDW BLD-RTO: 17.7 FL (ref 11.5–15)
PDW BLD-RTO: 19.1 FL (ref 11.5–15)
PDW BLD-RTO: 19.5 FL (ref 11.5–15)
PH (TEMPERATURE CORRECTED): 7.34 (ref 7.35–7.45)
PHOSPHORUS: 2.4 MG/DL (ref 2.5–4.5)
PLATELET # BLD: 116 E9/L (ref 130–450)
PLATELET # BLD: 149 E9/L (ref 130–450)
PLATELET # BLD: 73 E9/L (ref 130–450)
PLATELET CONFIRMATION: NORMAL
PMV BLD AUTO: 9 FL (ref 7–12)
PMV BLD AUTO: 9.3 FL (ref 7–12)
PMV BLD AUTO: 9.4 FL (ref 7–12)
PO2 (TEMP CORRECTED): 180.9 MMHG (ref 60–80)
POIKILOCYTES: ABNORMAL
POLYCHROMASIA: ABNORMAL
POSITIVE END EXP PRESS: 5 CMH2O
POTASSIUM SERPL-SCNC: 3.3 MMOL/L (ref 3.5–5)
POTASSIUM SERPL-SCNC: 3.3 MMOL/L (ref 3.5–5)
POTASSIUM SERPL-SCNC: 4 MMOL/L (ref 3.5–5)
PROTHROMBIN TIME: 18.3 SEC (ref 9.3–12.4)
RBC # BLD: 2.42 E12/L (ref 3.5–5.5)
RBC # BLD: 2.62 E12/L (ref 3.5–5.5)
RBC # BLD: 4.01 E12/L (ref 3.5–5.5)
RESPIRATORY RATE: 16 B/MIN
ROULEAUX: ABNORMAL
SODIUM BLD-SCNC: 137 MMOL/L (ref 132–146)
SODIUM BLD-SCNC: 137 MMOL/L (ref 132–146)
SODIUM BLD-SCNC: 138 MMOL/L (ref 132–146)
SOURCE, BLOOD GAS: ABNORMAL
TIDAL VOLUME: 400 ML
TOTAL IRON BINDING CAPACITY: 25 MCG/DL (ref 250–450)
TOTAL PROTEIN: 4.6 G/DL (ref 6.4–8.3)
TOTAL PROTEIN: 4.6 G/DL (ref 6.4–8.3)
TOXIC GRANULATION: ABNORMAL
TOXIC GRANULATION: ABNORMAL
WBC # BLD: 21.4 E9/L (ref 4.5–11.5)
WBC # BLD: 21.7 E9/L (ref 4.5–11.5)
WBC # BLD: 33.2 E9/L (ref 4.5–11.5)

## 2020-10-07 PROCEDURE — 87077 CULTURE AEROBIC IDENTIFY: CPT

## 2020-10-07 PROCEDURE — 5A1955Z RESPIRATORY VENTILATION, GREATER THAN 96 CONSECUTIVE HOURS: ICD-10-PCS | Performed by: INTERNAL MEDICINE

## 2020-10-07 PROCEDURE — 87205 SMEAR GRAM STAIN: CPT

## 2020-10-07 PROCEDURE — 44150 REMOVAL OF COLON: CPT | Performed by: SURGERY

## 2020-10-07 PROCEDURE — 84100 ASSAY OF PHOSPHORUS: CPT

## 2020-10-07 PROCEDURE — 3600000003 HC SURGERY LEVEL 3 BASE: Performed by: SURGERY

## 2020-10-07 PROCEDURE — 6360000002 HC RX W HCPCS: Performed by: INTERNAL MEDICINE

## 2020-10-07 PROCEDURE — 2580000003 HC RX 258: Performed by: INTERNAL MEDICINE

## 2020-10-07 PROCEDURE — 88307 TISSUE EXAM BY PATHOLOGIST: CPT

## 2020-10-07 PROCEDURE — 6370000000 HC RX 637 (ALT 250 FOR IP): Performed by: INTERNAL MEDICINE

## 2020-10-07 PROCEDURE — 82728 ASSAY OF FERRITIN: CPT

## 2020-10-07 PROCEDURE — C9113 INJ PANTOPRAZOLE SODIUM, VIA: HCPCS | Performed by: INTERNAL MEDICINE

## 2020-10-07 PROCEDURE — 2580000003 HC RX 258: Performed by: STUDENT IN AN ORGANIZED HEALTH CARE EDUCATION/TRAINING PROGRAM

## 2020-10-07 PROCEDURE — 2580000003 HC RX 258: Performed by: NURSE PRACTITIONER

## 2020-10-07 PROCEDURE — 87186 SC STD MICRODIL/AGAR DIL: CPT

## 2020-10-07 PROCEDURE — 87102 FUNGUS ISOLATION CULTURE: CPT

## 2020-10-07 PROCEDURE — 36430 TRANSFUSION BLD/BLD COMPNT: CPT

## 2020-10-07 PROCEDURE — 83550 IRON BINDING TEST: CPT

## 2020-10-07 PROCEDURE — 2000000000 HC ICU R&B

## 2020-10-07 PROCEDURE — 2500000003 HC RX 250 WO HCPCS: Performed by: NURSE ANESTHETIST, CERTIFIED REGISTERED

## 2020-10-07 PROCEDURE — 85025 COMPLETE CBC W/AUTO DIFF WBC: CPT

## 2020-10-07 PROCEDURE — 2500000003 HC RX 250 WO HCPCS: Performed by: STUDENT IN AN ORGANIZED HEALTH CARE EDUCATION/TRAINING PROGRAM

## 2020-10-07 PROCEDURE — 94002 VENT MGMT INPAT INIT DAY: CPT

## 2020-10-07 PROCEDURE — 83735 ASSAY OF MAGNESIUM: CPT

## 2020-10-07 PROCEDURE — 2709999900 HC NON-CHARGEABLE SUPPLY: Performed by: SURGERY

## 2020-10-07 PROCEDURE — 83605 ASSAY OF LACTIC ACID: CPT

## 2020-10-07 PROCEDURE — 3700000001 HC ADD 15 MINUTES (ANESTHESIA): Performed by: SURGERY

## 2020-10-07 PROCEDURE — 3600000013 HC SURGERY LEVEL 3 ADDTL 15MIN: Performed by: SURGERY

## 2020-10-07 PROCEDURE — 74018 RADEX ABDOMEN 1 VIEW: CPT

## 2020-10-07 PROCEDURE — 6370000000 HC RX 637 (ALT 250 FOR IP): Performed by: STUDENT IN AN ORGANIZED HEALTH CARE EDUCATION/TRAINING PROGRAM

## 2020-10-07 PROCEDURE — 85610 PROTHROMBIN TIME: CPT

## 2020-10-07 PROCEDURE — P9047 ALBUMIN (HUMAN), 25%, 50ML: HCPCS | Performed by: INTERNAL MEDICINE

## 2020-10-07 PROCEDURE — 2580000003 HC RX 258

## 2020-10-07 PROCEDURE — 2500000003 HC RX 250 WO HCPCS: Performed by: NURSE PRACTITIONER

## 2020-10-07 PROCEDURE — 85018 HEMOGLOBIN: CPT

## 2020-10-07 PROCEDURE — 85027 COMPLETE CBC AUTOMATED: CPT

## 2020-10-07 PROCEDURE — 71045 X-RAY EXAM CHEST 1 VIEW: CPT

## 2020-10-07 PROCEDURE — 87116 MYCOBACTERIA CULTURE: CPT

## 2020-10-07 PROCEDURE — 87070 CULTURE OTHR SPECIMN AEROBIC: CPT

## 2020-10-07 PROCEDURE — 85730 THROMBOPLASTIN TIME PARTIAL: CPT

## 2020-10-07 PROCEDURE — 2500000003 HC RX 250 WO HCPCS

## 2020-10-07 PROCEDURE — 99233 SBSQ HOSP IP/OBS HIGH 50: CPT | Performed by: INTERNAL MEDICINE

## 2020-10-07 PROCEDURE — 85014 HEMATOCRIT: CPT

## 2020-10-07 PROCEDURE — 0BH17EZ INSERTION OF ENDOTRACHEAL AIRWAY INTO TRACHEA, VIA NATURAL OR ARTIFICIAL OPENING: ICD-10-PCS | Performed by: INTERNAL MEDICINE

## 2020-10-07 PROCEDURE — 6360000002 HC RX W HCPCS: Performed by: NURSE ANESTHETIST, CERTIFIED REGISTERED

## 2020-10-07 PROCEDURE — 83540 ASSAY OF IRON: CPT

## 2020-10-07 PROCEDURE — 2580000003 HC RX 258: Performed by: NURSE ANESTHETIST, CERTIFIED REGISTERED

## 2020-10-07 PROCEDURE — 36415 COLL VENOUS BLD VENIPUNCTURE: CPT

## 2020-10-07 PROCEDURE — 87106 FUNGI IDENTIFICATION YEAST: CPT

## 2020-10-07 PROCEDURE — 82803 BLOOD GASES ANY COMBINATION: CPT

## 2020-10-07 PROCEDURE — 80053 COMPREHEN METABOLIC PANEL: CPT

## 2020-10-07 PROCEDURE — 2720000010 HC SURG SUPPLY STERILE: Performed by: SURGERY

## 2020-10-07 PROCEDURE — 87075 CULTR BACTERIA EXCEPT BLOOD: CPT

## 2020-10-07 PROCEDURE — 87206 SMEAR FLUORESCENT/ACID STAI: CPT

## 2020-10-07 PROCEDURE — 82533 TOTAL CORTISOL: CPT

## 2020-10-07 PROCEDURE — 2500000003 HC RX 250 WO HCPCS: Performed by: INTERNAL MEDICINE

## 2020-10-07 PROCEDURE — 87015 SPECIMEN INFECT AGNT CONCNTJ: CPT

## 2020-10-07 PROCEDURE — 0DTE0ZZ RESECTION OF LARGE INTESTINE, OPEN APPROACH: ICD-10-PCS | Performed by: SURGERY

## 2020-10-07 PROCEDURE — 80048 BASIC METABOLIC PNL TOTAL CA: CPT

## 2020-10-07 PROCEDURE — 37799 UNLISTED PX VASCULAR SURGERY: CPT

## 2020-10-07 PROCEDURE — 3700000000 HC ANESTHESIA ATTENDED CARE: Performed by: SURGERY

## 2020-10-07 PROCEDURE — 6360000002 HC RX W HCPCS: Performed by: NURSE PRACTITIONER

## 2020-10-07 PROCEDURE — 97606 NEG PRS WND THER DME>50 SQCM: CPT | Performed by: SURGERY

## 2020-10-07 RX ORDER — MINERAL OIL AND WHITE PETROLATUM 150; 830 MG/G; MG/G
OINTMENT OPHTHALMIC PRN
Status: DISCONTINUED | OUTPATIENT
Start: 2020-10-07 | End: 2020-10-15 | Stop reason: HOSPADM

## 2020-10-07 RX ORDER — ETOMIDATE 2 MG/ML
INJECTION INTRAVENOUS PRN
Status: DISCONTINUED | OUTPATIENT
Start: 2020-10-07 | End: 2020-10-07 | Stop reason: SDUPTHER

## 2020-10-07 RX ORDER — POTASSIUM CHLORIDE 7.45 MG/ML
INJECTION INTRAVENOUS
Status: DISCONTINUED
Start: 2020-10-07 | End: 2020-10-07

## 2020-10-07 RX ORDER — POTASSIUM CHLORIDE 7.45 MG/ML
10 INJECTION INTRAVENOUS
Status: DISCONTINUED | OUTPATIENT
Start: 2020-10-07 | End: 2020-10-07 | Stop reason: CLARIF

## 2020-10-07 RX ORDER — SODIUM CHLORIDE 0.9 % (FLUSH) 0.9 %
SYRINGE (ML) INJECTION
Status: COMPLETED
Start: 2020-10-07 | End: 2020-10-07

## 2020-10-07 RX ORDER — MINERAL OIL AND WHITE PETROLATUM 150; 830 MG/G; MG/G
1 OINTMENT OPHTHALMIC PRN
Status: DISCONTINUED | OUTPATIENT
Start: 2020-10-07 | End: 2020-10-07 | Stop reason: SDUPTHER

## 2020-10-07 RX ORDER — 0.9 % SODIUM CHLORIDE 0.9 %
20 INTRAVENOUS SOLUTION INTRAVENOUS ONCE
Status: DISCONTINUED | OUTPATIENT
Start: 2020-10-07 | End: 2020-10-07

## 2020-10-07 RX ORDER — CEFAZOLIN SODIUM 2 G/50ML
SOLUTION INTRAVENOUS PRN
Status: DISCONTINUED | OUTPATIENT
Start: 2020-10-07 | End: 2020-10-07 | Stop reason: SDUPTHER

## 2020-10-07 RX ORDER — 0.9 % SODIUM CHLORIDE 0.9 %
20 INTRAVENOUS SOLUTION INTRAVENOUS ONCE
Status: COMPLETED | OUTPATIENT
Start: 2020-10-07 | End: 2020-10-07

## 2020-10-07 RX ORDER — DEXTROSE, SODIUM CHLORIDE, AND POTASSIUM CHLORIDE 5; .9; .15 G/100ML; G/100ML; G/100ML
INJECTION INTRAVENOUS CONTINUOUS
Status: DISCONTINUED | OUTPATIENT
Start: 2020-10-07 | End: 2020-10-07

## 2020-10-07 RX ORDER — PROPOFOL 10 MG/ML
10 INJECTION, EMULSION INTRAVENOUS CONTINUOUS
Status: DISCONTINUED | OUTPATIENT
Start: 2020-10-07 | End: 2020-10-10

## 2020-10-07 RX ORDER — FENTANYL CITRATE 50 UG/ML
INJECTION, SOLUTION INTRAMUSCULAR; INTRAVENOUS PRN
Status: DISCONTINUED | OUTPATIENT
Start: 2020-10-07 | End: 2020-10-07 | Stop reason: SDUPTHER

## 2020-10-07 RX ORDER — POTASSIUM CHLORIDE 29.8 MG/ML
20 INJECTION INTRAVENOUS ONCE
Status: COMPLETED | OUTPATIENT
Start: 2020-10-07 | End: 2020-10-07

## 2020-10-07 RX ORDER — POTASSIUM CHLORIDE 7.45 MG/ML
20 INJECTION INTRAVENOUS ONCE
Status: DISCONTINUED | OUTPATIENT
Start: 2020-10-07 | End: 2020-10-07 | Stop reason: CLARIF

## 2020-10-07 RX ORDER — DEXAMETHASONE SODIUM PHOSPHATE 4 MG/ML
INJECTION, SOLUTION INTRA-ARTICULAR; INTRALESIONAL; INTRAMUSCULAR; INTRAVENOUS; SOFT TISSUE PRN
Status: DISCONTINUED | OUTPATIENT
Start: 2020-10-07 | End: 2020-10-07 | Stop reason: SDUPTHER

## 2020-10-07 RX ORDER — CHLORHEXIDINE GLUCONATE 0.12 MG/ML
15 RINSE ORAL 2 TIMES DAILY
Status: DISCONTINUED | OUTPATIENT
Start: 2020-10-07 | End: 2020-10-15 | Stop reason: HOSPADM

## 2020-10-07 RX ORDER — SODIUM CHLORIDE AND POTASSIUM CHLORIDE .9; .15 G/100ML; G/100ML
SOLUTION INTRAVENOUS CONTINUOUS
Status: DISCONTINUED | OUTPATIENT
Start: 2020-10-07 | End: 2020-10-07

## 2020-10-07 RX ORDER — SODIUM CHLORIDE, SODIUM LACTATE, POTASSIUM CHLORIDE, CALCIUM CHLORIDE 600; 310; 30; 20 MG/100ML; MG/100ML; MG/100ML; MG/100ML
INJECTION, SOLUTION INTRAVENOUS CONTINUOUS
Status: DISCONTINUED | OUTPATIENT
Start: 2020-10-07 | End: 2020-10-08

## 2020-10-07 RX ORDER — ONDANSETRON 2 MG/ML
INJECTION INTRAMUSCULAR; INTRAVENOUS PRN
Status: DISCONTINUED | OUTPATIENT
Start: 2020-10-07 | End: 2020-10-07 | Stop reason: SDUPTHER

## 2020-10-07 RX ORDER — SODIUM CHLORIDE 9 MG/ML
INJECTION, SOLUTION INTRAVENOUS CONTINUOUS PRN
Status: DISCONTINUED | OUTPATIENT
Start: 2020-10-07 | End: 2020-10-07 | Stop reason: SDUPTHER

## 2020-10-07 RX ORDER — ROCURONIUM BROMIDE 10 MG/ML
INJECTION, SOLUTION INTRAVENOUS PRN
Status: DISCONTINUED | OUTPATIENT
Start: 2020-10-07 | End: 2020-10-07 | Stop reason: SDUPTHER

## 2020-10-07 RX ORDER — DIGOXIN 0.25 MG/ML
250 INJECTION INTRAMUSCULAR; INTRAVENOUS ONCE
Status: COMPLETED | OUTPATIENT
Start: 2020-10-07 | End: 2020-10-07

## 2020-10-07 RX ADMIN — Medication 100 MEQ: at 23:07

## 2020-10-07 RX ADMIN — DIGOXIN 250 MCG: 0.25 INJECTION INTRAMUSCULAR; INTRAVENOUS at 21:59

## 2020-10-07 RX ADMIN — PHENYLEPHRINE HYDROCHLORIDE 200 MCG: 10 INJECTION INTRAVENOUS at 19:19

## 2020-10-07 RX ADMIN — PHENYLEPHRINE HYDROCHLORIDE 100 MCG: 10 INJECTION INTRAVENOUS at 17:50

## 2020-10-07 RX ADMIN — Medication 250 MG: at 06:00

## 2020-10-07 RX ADMIN — ROCURONIUM BROMIDE 40 MG: 10 INJECTION, SOLUTION INTRAVENOUS at 17:39

## 2020-10-07 RX ADMIN — CHLORHEXIDINE GLUCONATE 0.12% ORAL RINSE 15 ML: 1.2 LIQUID ORAL at 21:57

## 2020-10-07 RX ADMIN — PHENYLEPHRINE HYDROCHLORIDE 200 MCG: 10 INJECTION INTRAVENOUS at 18:54

## 2020-10-07 RX ADMIN — POTASSIUM CHLORIDE 20 MEQ: 7.46 INJECTION, SOLUTION INTRAVENOUS at 10:47

## 2020-10-07 RX ADMIN — METRONIDAZOLE 500 MG: 500 INJECTION, SOLUTION INTRAVENOUS at 21:58

## 2020-10-07 RX ADMIN — PHENYLEPHRINE HYDROCHLORIDE 200 MCG: 10 INJECTION INTRAVENOUS at 16:58

## 2020-10-07 RX ADMIN — SODIUM BICARBONATE 100 MEQ: 84 INJECTION, SOLUTION INTRAVENOUS at 23:07

## 2020-10-07 RX ADMIN — MESALAMINE 500 MG: 250 CAPSULE ORAL at 12:51

## 2020-10-07 RX ADMIN — PHENYLEPHRINE HYDROCHLORIDE 200 MCG: 10 INJECTION INTRAVENOUS at 18:24

## 2020-10-07 RX ADMIN — PHENYLEPHRINE HYDROCHLORIDE 200 MCG: 10 INJECTION INTRAVENOUS at 19:21

## 2020-10-07 RX ADMIN — METRONIDAZOLE 500 MG: 500 INJECTION, SOLUTION INTRAVENOUS at 12:32

## 2020-10-07 RX ADMIN — ALBUMIN (HUMAN) 25 G: 0.25 INJECTION, SOLUTION INTRAVENOUS at 03:00

## 2020-10-07 RX ADMIN — ROCURONIUM BROMIDE 30 MG: 10 INJECTION, SOLUTION INTRAVENOUS at 16:47

## 2020-10-07 RX ADMIN — PHENYLEPHRINE HYDROCHLORIDE 100 MCG: 10 INJECTION INTRAVENOUS at 16:55

## 2020-10-07 RX ADMIN — SODIUM CHLORIDE: 9 INJECTION, SOLUTION INTRAVENOUS at 16:29

## 2020-10-07 RX ADMIN — ETOMIDATE 10 MG: 2 INJECTION, SOLUTION INTRAVENOUS at 16:47

## 2020-10-07 RX ADMIN — POTASSIUM CHLORIDE 20 MEQ: 29.8 INJECTION, SOLUTION INTRAVENOUS at 12:03

## 2020-10-07 RX ADMIN — PHENYLEPHRINE HYDROCHLORIDE 100 MCG: 10 INJECTION INTRAVENOUS at 19:15

## 2020-10-07 RX ADMIN — Medication 250 MG: at 12:51

## 2020-10-07 RX ADMIN — PHENYLEPHRINE HYDROCHLORIDE 200 MCG: 10 INJECTION INTRAVENOUS at 19:17

## 2020-10-07 RX ADMIN — PHENYLEPHRINE HYDROCHLORIDE 50 MCG/MIN: 10 INJECTION INTRAVENOUS at 21:58

## 2020-10-07 RX ADMIN — ENOXAPARIN SODIUM 30 MG: 30 INJECTION SUBCUTANEOUS at 08:16

## 2020-10-07 RX ADMIN — PHENYLEPHRINE HYDROCHLORIDE 200 MCG: 10 INJECTION INTRAVENOUS at 19:13

## 2020-10-07 RX ADMIN — PHENYLEPHRINE HYDROCHLORIDE 200 MCG: 10 INJECTION INTRAVENOUS at 17:56

## 2020-10-07 RX ADMIN — ALBUMIN (HUMAN) 25 G: 0.25 INJECTION, SOLUTION INTRAVENOUS at 09:23

## 2020-10-07 RX ADMIN — CEFAZOLIN SODIUM 2 G: 2 SOLUTION INTRAVENOUS at 16:54

## 2020-10-07 RX ADMIN — SODIUM CHLORIDE 20 ML: 9 INJECTION, SOLUTION INTRAVENOUS at 05:00

## 2020-10-07 RX ADMIN — PHENYLEPHRINE HYDROCHLORIDE 200 MCG: 10 INJECTION INTRAVENOUS at 18:43

## 2020-10-07 RX ADMIN — PHENYLEPHRINE HYDROCHLORIDE 200 MCG: 10 INJECTION INTRAVENOUS at 19:24

## 2020-10-07 RX ADMIN — ACETAMINOPHEN 650 MG: 325 TABLET ORAL at 09:13

## 2020-10-07 RX ADMIN — SOTALOL HYDROCHLORIDE 120 MG: 120 TABLET ORAL at 08:09

## 2020-10-07 RX ADMIN — DEXAMETHASONE SODIUM PHOSPHATE 10 MG: 4 INJECTION, SOLUTION INTRAMUSCULAR; INTRAVENOUS at 16:57

## 2020-10-07 RX ADMIN — PHENYLEPHRINE HYDROCHLORIDE 200 MCG: 10 INJECTION INTRAVENOUS at 18:49

## 2020-10-07 RX ADMIN — FENTANYL CITRATE 50 MCG: 50 INJECTION, SOLUTION INTRAMUSCULAR; INTRAVENOUS at 16:47

## 2020-10-07 RX ADMIN — Medication 250 MG: at 00:00

## 2020-10-07 RX ADMIN — CALCIUM GLUCONATE 2 G: 98 INJECTION, SOLUTION INTRAVENOUS at 11:26

## 2020-10-07 RX ADMIN — FERROUS SULFATE TAB 325 MG (65 MG ELEMENTAL FE) 325 MG: 325 (65 FE) TAB at 08:10

## 2020-10-07 RX ADMIN — SODIUM CHLORIDE, PRESERVATIVE FREE 10 ML: 5 INJECTION INTRAVENOUS at 08:11

## 2020-10-07 RX ADMIN — PHENYLEPHRINE HYDROCHLORIDE 200 MCG: 10 INJECTION INTRAVENOUS at 17:46

## 2020-10-07 RX ADMIN — METRONIDAZOLE 500 MG: 500 INJECTION, SOLUTION INTRAVENOUS at 04:00

## 2020-10-07 RX ADMIN — SODIUM CHLORIDE, PRESERVATIVE FREE 10 ML: 5 INJECTION INTRAVENOUS at 23:09

## 2020-10-07 RX ADMIN — ONDANSETRON 4 MG: 2 INJECTION INTRAMUSCULAR; INTRAVENOUS at 16:57

## 2020-10-07 RX ADMIN — PHENYLEPHRINE HYDROCHLORIDE 200 MCG: 10 INJECTION INTRAVENOUS at 17:01

## 2020-10-07 RX ADMIN — SODIUM CHLORIDE, POTASSIUM CHLORIDE, SODIUM LACTATE AND CALCIUM CHLORIDE: 600; 310; 30; 20 INJECTION, SOLUTION INTRAVENOUS at 21:58

## 2020-10-07 RX ADMIN — Medication 10 MCG/MIN: at 19:30

## 2020-10-07 RX ADMIN — SODIUM CHLORIDE: 9 INJECTION, SOLUTION INTRAVENOUS at 18:59

## 2020-10-07 RX ADMIN — MESALAMINE 500 MG: 250 CAPSULE ORAL at 08:06

## 2020-10-07 RX ADMIN — PANTOPRAZOLE SODIUM 40 MG: 40 INJECTION, POWDER, FOR SOLUTION INTRAVENOUS at 08:10

## 2020-10-07 RX ADMIN — PHENYLEPHRINE HYDROCHLORIDE 100 MCG: 10 INJECTION INTRAVENOUS at 19:10

## 2020-10-07 RX ADMIN — POTASSIUM CHLORIDE, DEXTROSE MONOHYDRATE AND SODIUM CHLORIDE: 150; 5; 900 INJECTION, SOLUTION INTRAVENOUS at 11:26

## 2020-10-07 RX ADMIN — PHENYLEPHRINE HYDROCHLORIDE 200 MCG: 10 INJECTION INTRAVENOUS at 18:34

## 2020-10-07 RX ADMIN — PHENYLEPHRINE HYDROCHLORIDE 100 MCG: 10 INJECTION INTRAVENOUS at 18:38

## 2020-10-07 ASSESSMENT — PULMONARY FUNCTION TESTS
PIF_VALUE: 21
PIF_VALUE: 20
PIF_VALUE: 17
PIF_VALUE: 25
PIF_VALUE: 22
PIF_VALUE: 18
PIF_VALUE: 21
PIF_VALUE: 21
PIF_VALUE: 19
PIF_VALUE: 17
PIF_VALUE: 19
PIF_VALUE: 18
PIF_VALUE: 22
PIF_VALUE: 17
PIF_VALUE: 1
PIF_VALUE: 18
PIF_VALUE: 18
PIF_VALUE: 17
PIF_VALUE: 18
PIF_VALUE: 1
PIF_VALUE: 21
PIF_VALUE: 20
PIF_VALUE: 19
PIF_VALUE: 1
PIF_VALUE: 21
PIF_VALUE: 25
PIF_VALUE: 21
PIF_VALUE: 19
PIF_VALUE: 18
PIF_VALUE: 18
PIF_VALUE: 21
PIF_VALUE: 21
PIF_VALUE: 22
PIF_VALUE: 19
PIF_VALUE: 21
PIF_VALUE: 26
PIF_VALUE: 17
PIF_VALUE: 18
PIF_VALUE: 17
PIF_VALUE: 21
PIF_VALUE: 17
PIF_VALUE: 20
PIF_VALUE: 21
PIF_VALUE: 15
PIF_VALUE: 18
PIF_VALUE: 24
PIF_VALUE: 21
PIF_VALUE: 18
PIF_VALUE: 18
PIF_VALUE: 17
PIF_VALUE: 21
PIF_VALUE: 1
PIF_VALUE: 22
PIF_VALUE: 21
PIF_VALUE: 25
PIF_VALUE: 17
PIF_VALUE: 22
PIF_VALUE: 21
PIF_VALUE: 22
PIF_VALUE: 19
PIF_VALUE: 19
PIF_VALUE: 18
PIF_VALUE: 21
PIF_VALUE: 18
PIF_VALUE: 25
PIF_VALUE: 22
PIF_VALUE: 21
PIF_VALUE: 17
PIF_VALUE: 1
PIF_VALUE: 18
PIF_VALUE: 21
PIF_VALUE: 23
PIF_VALUE: 17
PIF_VALUE: 24
PIF_VALUE: 18
PIF_VALUE: 1
PIF_VALUE: 18
PIF_VALUE: 25
PIF_VALUE: 19
PIF_VALUE: 17
PIF_VALUE: 18
PIF_VALUE: 21
PIF_VALUE: 25
PIF_VALUE: 22
PIF_VALUE: 1
PIF_VALUE: 19
PIF_VALUE: 20
PIF_VALUE: 25
PIF_VALUE: 1
PIF_VALUE: 19
PIF_VALUE: 1
PIF_VALUE: 21
PIF_VALUE: 7
PIF_VALUE: 22
PIF_VALUE: 24
PIF_VALUE: 18
PIF_VALUE: 21
PIF_VALUE: 20
PIF_VALUE: 22
PIF_VALUE: 17
PIF_VALUE: 19
PIF_VALUE: 1
PIF_VALUE: 17
PIF_VALUE: 25
PIF_VALUE: 19
PIF_VALUE: 21
PIF_VALUE: 18
PIF_VALUE: 17
PIF_VALUE: 20
PIF_VALUE: 25
PIF_VALUE: 19
PIF_VALUE: 2
PIF_VALUE: 21
PIF_VALUE: 18
PIF_VALUE: 18
PIF_VALUE: 22
PIF_VALUE: 21
PIF_VALUE: 20
PIF_VALUE: 18
PIF_VALUE: 20
PIF_VALUE: 18
PIF_VALUE: 17
PIF_VALUE: 23
PIF_VALUE: 1
PIF_VALUE: 18
PIF_VALUE: 19
PIF_VALUE: 23
PIF_VALUE: 25
PIF_VALUE: 19
PIF_VALUE: 18
PIF_VALUE: 18
PIF_VALUE: 22
PIF_VALUE: 17
PIF_VALUE: 1
PIF_VALUE: 18
PIF_VALUE: 19
PIF_VALUE: 18
PIF_VALUE: 22
PIF_VALUE: 1
PIF_VALUE: 25
PIF_VALUE: 18
PIF_VALUE: 18
PIF_VALUE: 19
PIF_VALUE: 19
PIF_VALUE: 25
PIF_VALUE: 20
PIF_VALUE: 21
PIF_VALUE: 20
PIF_VALUE: 18
PIF_VALUE: 23
PIF_VALUE: 24
PIF_VALUE: 17
PIF_VALUE: 19
PIF_VALUE: 24
PIF_VALUE: 0
PIF_VALUE: 18
PIF_VALUE: 18
PIF_VALUE: 17
PIF_VALUE: 21
PIF_VALUE: 21
PIF_VALUE: 19
PIF_VALUE: 21
PIF_VALUE: 22
PIF_VALUE: 21
PIF_VALUE: 22
PIF_VALUE: 1
PIF_VALUE: 19
PIF_VALUE: 19
PIF_VALUE: 18
PIF_VALUE: 22
PIF_VALUE: 21
PIF_VALUE: 22
PIF_VALUE: 21
PIF_VALUE: 21
PIF_VALUE: 18
PIF_VALUE: 17
PIF_VALUE: 18
PIF_VALUE: 24
PIF_VALUE: 18
PIF_VALUE: 18
PIF_VALUE: 19
PIF_VALUE: 21
PIF_VALUE: 17
PIF_VALUE: 23
PIF_VALUE: 18
PIF_VALUE: 22
PIF_VALUE: 17
PIF_VALUE: 22
PIF_VALUE: 20
PIF_VALUE: 1
PIF_VALUE: 21
PIF_VALUE: 18
PIF_VALUE: 1
PIF_VALUE: 1
PIF_VALUE: 24
PIF_VALUE: 19
PIF_VALUE: 20
PIF_VALUE: 25
PIF_VALUE: 17
PIF_VALUE: 17
PIF_VALUE: 18
PIF_VALUE: 17
PIF_VALUE: 17
PIF_VALUE: 23
PIF_VALUE: 21
PIF_VALUE: 21
PIF_VALUE: 25
PIF_VALUE: 23
PIF_VALUE: 21
PIF_VALUE: 0
PIF_VALUE: 19
PIF_VALUE: 21
PIF_VALUE: 18
PIF_VALUE: 20
PIF_VALUE: 22
PIF_VALUE: 21

## 2020-10-07 ASSESSMENT — PAIN SCALES - GENERAL
PAINLEVEL_OUTOF10: 10
PAINLEVEL_OUTOF10: 0
PAINLEVEL_OUTOF10: 10
PAINLEVEL_OUTOF10: 10
PAINLEVEL_OUTOF10: 0

## 2020-10-07 ASSESSMENT — PAIN DESCRIPTION - FREQUENCY: FREQUENCY: CONTINUOUS

## 2020-10-07 ASSESSMENT — PAIN DESCRIPTION - PAIN TYPE: TYPE: ACUTE PAIN

## 2020-10-07 ASSESSMENT — PAIN DESCRIPTION - ONSET: ONSET: ON-GOING

## 2020-10-07 ASSESSMENT — PAIN DESCRIPTION - LOCATION: LOCATION: ABDOMEN

## 2020-10-07 ASSESSMENT — PAIN DESCRIPTION - DESCRIPTORS: DESCRIPTORS: CRAMPING;SHARP

## 2020-10-07 ASSESSMENT — PAIN DESCRIPTION - ORIENTATION: ORIENTATION: RIGHT

## 2020-10-07 NOTE — PROGRESS NOTES
Physical Therapy  Facility/Department: [address] INTERNAL MEDICINE 2    NAME: Chesley Sever  : 1935  MRN: [id number]    Date of Service: 10/7/2020    Attempted to see pt for PT session, pt off unit,.    Zakia GARCIA [id number]

## 2020-10-07 NOTE — PROGRESS NOTES
Department of Internal Medicine  Nephrology Attending Progress Note      Reason for Consult: QI  Requesting Physician:  Dr Larissa Garcia:  weakness    History Obtained From:  patient, family member - daughter    Sub: Patient seen and examined at bedside. She was transferred out of the ICU yesterday. Her Vital signs are stable. She denies complaints at this time.        Past Medical History:        Diagnosis Date    Anemia     Atrial fibrillation (HCC)     Colitis     Hypertension        Current Medications:    Current Facility-Administered Medications: 0.9% NaCl with KCl 20 mEq infusion, , Intravenous, Continuous  metronidazole (FLAGYL) 500 mg in NaCl 100 mL IVPB premix, 500 mg, Intravenous, Q8H  [DISCONTINUED] cefepime (MAXIPIME) 2 g IVPB extended (mini-bag), 2 g, Intravenous, Q24H **AND** 0.9 % sodium chloride infusion, , Intravenous, Q24H  vancomycin (VANCOCIN) oral solution 250 mg, 250 mg, Oral, 4 times per day  pantoprazole (PROTONIX) injection 40 mg, 40 mg, Intravenous, Daily  enoxaparin (LOVENOX) injection 30 mg, 30 mg, Subcutaneous, Daily  acetaminophen (TYLENOL) tablet 650 mg, 650 mg, Oral, Q6H PRN  amitriptyline (ELAVIL) tablet 10 mg, 10 mg, Oral, Nightly  calcium carbonate (TUMS) chewable tablet 500 mg, 500 mg, Oral, Daily  ferrous sulfate (IRON 325) tablet 325 mg, 325 mg, Oral, Daily with breakfast  melatonin tablet 6 mg, 6 mg, Oral, Daily  mesalamine (PENTASA) extended release capsule 500 mg, 500 mg, Oral, 4x Daily  ondansetron (ZOFRAN) tablet 4 mg, 4 mg, Oral, Q6H PRN  sotalol (BETAPACE) tablet 120 mg, 120 mg, Oral, BID  sodium chloride flush 0.9 % injection 10 mL, 10 mL, Intravenous, 2 times per day  sodium chloride flush 0.9 % injection 10 mL, 10 mL, Intravenous, PRN  acetaminophen (TYLENOL) tablet 650 mg, 650 mg, Oral, Q6H PRN **OR** acetaminophen (TYLENOL) suppository 650 mg, 650 mg, Rectal, Q6H PRN  senna (SENOKOT) tablet 8.6 mg, 1 tablet, Oral, Daily PRN  Allergies: Jim    Social History:    TOBACCO:  Never used tobacco  ETOH:  Never drank alcohol  DRUGS:  Never used recreational drugs    Family History:   History reviewed. No pertinent family history.   REVIEW OF SYSTEMS:    CONSTITUTIONAL:  positive for  fatigue  EYES:  negative  HEENT:  negative  RESPIRATORY:  negative  CARDIOVASCULAR:  negative  GASTROINTESTINAL:  positive for change in bowel habits and diarrhea  GENITOURINARY:  negative  ENDOCRINE:  positive for weight changes  MUSCULOSKELETAL:  positive for  muscle weakness  PHYSICAL EXAM:      Vitals:    VITALS:  /60   Pulse 89   Temp 98.6 °F (37 °C) (Oral)   Resp 15   Ht 5' 6\" (1.676 m)   Wt 208 lb 6.4 oz (94.5 kg)   SpO2 90%   BMI 33.64 kg/m²   24HR BLOOD PRESSURE RANGE:  Systolic (79ECX), KAA:670 , Min:101 , BWE:884   ; Diastolic (02KWR), OID:95, Min:49, Max:87    24HR INTAKE/OUTPUT:      Intake/Output Summary (Last 24 hours) at 10/7/2020 1031  Last data filed at 10/7/2020 0543  Gross per 24 hour   Intake 980 ml   Output 805 ml   Net 175 ml       Constitutional:  Well developed and nourished , no acute distress  HEENT:  NC, PERRL, oral mucosa dry , neck supple, no JVD  Respiratory:  Clear bilaterally except decreased BS at bases  Cardiovascular/Edema: RRR, s1,s2 no gallop  Gastrointestinal:  Soft, benign, bowel sounds increased, no organomegaly  Neurologic:  Alert and Ox 3 , nonfocal  Skin:  Decreased turgor  Other:  No rash    DATA:    CBC:   Lab Results   Component Value Date    WBC 21.7 10/07/2020    RBC 2.42 10/07/2020    HGB 6.7 10/07/2020    HCT 20.8 10/07/2020    MCV 86.0 10/07/2020    MCH 27.7 10/07/2020    MCHC 32.2 10/07/2020    RDW 19.1 10/07/2020     10/07/2020    MPV 9.0 10/07/2020     CMP:    Lab Results   Component Value Date     10/07/2020    K 3.3 10/07/2020    K 3.6 10/06/2020     10/07/2020    CO2 19 10/07/2020    BUN 36 10/07/2020    CREATININE 0.9 10/07/2020    GFRAA >60 10/07/2020    LABGLOM 60 10/07/2020 GLUCOSE 73 10/07/2020    PROT 4.6 10/07/2020    LABALBU 3.2 10/07/2020    CALCIUM 7.5 10/07/2020    BILITOT 1.0 10/07/2020    ALKPHOS 77 10/07/2020    AST 10 10/07/2020    ALT 8 10/07/2020     Magnesium:    Lab Results   Component Value Date    MG 2.1 10/07/2020     Phosphorus:    Lab Results   Component Value Date    PHOS 2.4 10/07/2020     Uric Acid:  No results found for: Adah Ka  U/A:    Lab Results   Component Value Date    COLORU CASEY 10/04/2020    PROTEINU TRACE 10/04/2020    PHUR 5.0 10/04/2020    WBCUA >20 10/04/2020    RBCUA NONE 10/04/2020    BACTERIA FEW 10/04/2020    CLARITYU TURBID 10/04/2020    SPECGRAV 1.025 10/04/2020    LEUKOCYTESUR MODERATE 10/04/2020    UROBILINOGEN 0.2 10/04/2020    BILIRUBINUR MODERATE 10/04/2020    BLOODU Negative 10/04/2020    GLUCOSEU Negative 10/04/2020     Radiology Review:    Atelectasis and pleural effusion in the lung bases more on the left side.         Right IJ central line has noted without complications.           Problems resolved:       IMPRESSION/RECOMMENDATIONS:      Jessica Burdick is a 82 y.o. female with significant past medical history of Colitis, diarrhea, blood in stool, A-Fib, anemia, and HTN admitted via ED for hypotension and diarrhea. Pt with SBP in 80's in ED, she was treated with IV fluids. Pt has been having diarrhea for > 3 months stating stools are dark with red blood associated nausea, vomiting, and poor oral intake. Daughter reports that patient was discharged from [facility] and was living with her. Patient got progressively worse therefore she brought her to ED. Initial labs done showed serum sodium 126 and creatinine of 1.3 mg/dl yesterday which worsened to 122 and 1.6 mg/dl respectively therefore this consultation was ordered. 1. QI secondary to volume responsive prerenal state versus ischemic ATN    2. Hyponatremia probably secondary to GI losses  3. C. Difficile colitis: on oral vancomycin and Flagyl  4.

## 2020-10-07 NOTE — PROGRESS NOTES
PROGRESS NOTE    Patient Presents with/Seen in Consultation For      *Ulcerative colitis hx, GI bleed   CHIEF COMPLAINT:  Diarrhea and hypotension    Subjective:     Patient states she has diffuse abdominal pain 10/10. Son at bedside, patient telling him she loved him. Patient reports no nausea or vomiting. Son crying at bedside, emotional support given. Review of Systems  Aside from what was mentioned in the PMH and HPI, essentially unremarkable, all others negative. Objective:     /76   Pulse 124   Temp 97.8 °F (36.6 °C) (Oral)   Resp 16   Ht 5' 6\" (1.676 m)   Wt 208 lb 6.4 oz (94.5 kg)   SpO2 96%   BMI 33.64 kg/m²     General appearance: Drowsy to lethargic, pale, fatigued and ill appearing, laying in bed, and cooperative  Eyes: conjunctiva pale, sclera anicteric. PERRL. Lungs: Diminished to auscultation bilaterally  Heart: regular rate and rhythm, no murmur, 2+ pulses; 1+ BLE edema and LUE edema  Abdomen: Taut, distended, diffusely tender to palpation > right abdomen with guarding, with rebound; bowel sounds hypoactive; no masses,  no organomegaly  Extremities: extremities with 1+ BLE and LUE edema  Pulses: 2+ and symmetric  Skin: Skin color pale.    Neurologic: Drowsy to lethargic.    potassium chloride 10 MEQ/100ML IVPB (Peripheral Line),   0.9 % sodium chloride bolus, Once  dextrose 5 % and 0.9 % NaCl with KCl 20 mEq infusion, Continuous  metronidazole (FLAGYL) 500 mg in NaCl 100 mL IVPB premix, Q8H  0.9 % sodium chloride infusion, Q24H  vancomycin (VANCOCIN) oral solution 250 mg, 4 times per day  pantoprazole (PROTONIX) injection 40 mg, Daily  enoxaparin (LOVENOX) injection 30 mg, Daily  acetaminophen (TYLENOL) tablet 650 mg, Q6H PRN  amitriptyline (ELAVIL) tablet 10 mg, Nightly  calcium carbonate (TUMS) chewable tablet 500 mg, Daily  ferrous sulfate (IRON 325) tablet 325 mg, Daily with breakfast  melatonin tablet 6 mg, Daily  mesalamine (PENTASA) extended release capsule 500 mg, 4x Daily  ondansetron (ZOFRAN) tablet 4 mg, Q6H PRN  sotalol (BETAPACE) tablet 120 mg, BID  sodium chloride flush 0.9 % injection 10 mL, 2 times per day  sodium chloride flush 0.9 % injection 10 mL, PRN  acetaminophen (TYLENOL) tablet 650 mg, Q6H PRN    Or  acetaminophen (TYLENOL) suppository 650 mg, Q6H PRN  senna (SENOKOT) tablet 8.6 mg, Daily PRN         Data Review  CBC:   Lab Results   Component Value Date    WBC 21.4 10/07/2020    RBC 2.62 10/07/2020    HGB 7.6 10/07/2020    HCT 23.3 10/07/2020    MCV 88.9 10/07/2020    MCH 29.0 10/07/2020    MCHC 32.6 10/07/2020    RDW 19.5 10/07/2020     10/07/2020    MPV 9.4 10/07/2020     CMP:    Lab Results   Component Value Date     10/07/2020    K 3.3 10/07/2020    K 3.6 10/06/2020     10/07/2020    CO2 19 10/07/2020    BUN 36 10/07/2020    CREATININE 0.9 10/07/2020    GFRAA >60 10/07/2020    LABGLOM 60 10/07/2020    GLUCOSE 84 10/07/2020    PROT 4.6 10/07/2020    LABALBU 3.3 10/07/2020    CALCIUM 8.1 10/07/2020    BILITOT 1.4 10/07/2020    ALKPHOS 78 10/07/2020    AST 10 10/07/2020    ALT 7 10/07/2020     Hepatic Function Panel:    Lab Results   Component Value Date    ALKPHOS 78 10/07/2020    ALT 7 10/07/2020    AST 10 10/07/2020    PROT 4.6 10/07/2020    BILITOT 1.4 10/07/2020    BILIDIR 0.4 10/05/2020    IBILI 0.2 10/05/2020    LABALBU 3.3 10/07/2020       PT/INR:    Lab Results   Component Value Date    PROTIME 16.6 10/04/2020    INR 1.4 10/04/2020       Assessment:     Active Problems:  ? Pancolitis, infectious versus inflammatory, recent sigmoidoscopy with likely pseudomembranous colitis likely secondary to C. difficile -prior stool studies negative, will repeat   ? Bloody diarrhea  ? Abdominal pain, diffuse >RLQ  ? Unintentional weight  ? Hypotension on Levophed-defer to ICU  ? Weakness and fatigue  ? Severe leukocytosis -ID following  ? Hyponatremia, hypochloremia-defer to PCP  ? UTI with sepsis-defer to ID  ?  Diverticulosis with recent

## 2020-10-07 NOTE — ANESTHESIA PRE PROCEDURE
Department of Anesthesiology  Preprocedure Note       Name:  Michaela Setting   Age:  82 y.o.  :  1935                                          MRN:  [id number]         Date:  10/7/2020      Surgeon: Hilton Dumont):  Ming Daley MD    Procedure: Procedure(s):  TOTAL COLECTOMY (CONTACT ISOL. )    Medications prior to admission:   Prior to Admission medications    Medication Sig Start Date End Date Taking?  Authorizing Provider   acetaminophen (TYLENOL) 325 MG tablet Take 650 mg by mouth every 6 hours as needed for Pain   Yes Historical Provider, MD   amitriptyline (ELAVIL) 10 MG tablet Take 10 mg by mouth nightly   Yes Historical Provider, MD   dicyclomine (BENTYL) 10 MG capsule Take 10 mg by mouth every 6 hours as needed   Yes Historical Provider, MD   calcium carbonate 600 MG TABS tablet Take 1 tablet by mouth daily   Yes Historical Provider, MD   ferrous sulfate (IRON 325) 325 (65 Fe) MG tablet Take 325 mg by mouth daily (with breakfast)   Yes Historical Provider, MD   lisinopril (PRINIVIL;ZESTRIL) 40 MG tablet Take 40 mg by mouth daily   Yes Historical Provider, MD   melatonin 3 MG TABS tablet Take 6 mg by mouth daily   Yes Historical Provider, MD   tamsulosin (FLOMAX) 0.4 MG capsule Take 0.4 mg by mouth nightly    Historical Provider, MD   loperamide (IMODIUM) 2 MG capsule Take 2 mg by mouth every 12 hours as needed for Diarrhea    Historical Provider, MD   mesalamine (PENTASA) 500 MG extended release capsule Take 500 mg by mouth 4 times daily    Historical Provider, MD   cefTRIAXone (ROCEPHIN) 1 g injection Infuse 1 g intravenously every 24 hours    Historical Provider, MD   sotalol (BETAPACE) 120 MG tablet Take 120 mg by mouth 2 times daily    Historical Provider, MD   ondansetron (ZOFRAN) 4 MG tablet Take 4 mg by mouth every 6 hours as needed for Nausea or Vomiting    Historical Provider, MD       Current medications:    Current Facility-Administered Medications   Medication Dose Route Frequency Provider Last Rate Last Dose    potassium chloride 10 MEQ/100ML IVPB (Peripheral Line)             0.9 % sodium chloride bolus  20 mL Intravenous Once Pedro GarciaORIN CNP        dextrose 5 % and 0.9 % NaCl with KCl 20 mEq infusion   Intravenous Continuous Joann ShresthaORIN  mL/hr at 10/07/20 1126      metronidazole (FLAGYL) 500 mg in NaCl 100 mL IVPB premix  500 mg Intravenous Q8H Angelika Matthews MD   Stopped at 10/07/20 1349    0.9 % sodium chloride infusion   Intravenous Q24H Angelika Matthews MD        vancomycin [facility]) oral solution 250 mg  250 mg Oral 4 times per day Angelika Matthews MD   250 mg at 10/07/20 1251    pantoprazole (PROTONIX) injection 40 mg  40 mg Intravenous Daily Angelika Matthews MD   40 mg at 10/07/20 0810    enoxaparin (LOVENOX) injection 30 mg  30 mg Subcutaneous Daily Angelika Matthews MD   30 mg at 10/07/20 0816    acetaminophen (TYLENOL) tablet 650 mg  650 mg Oral Q6H PRN Angelika Matthews MD   650 mg at 10/07/20 0913    amitriptyline (ELAVIL) tablet 10 mg  10 mg Oral Nightly Angelika Matthews MD   10 mg at 10/06/20 2113    calcium carbonate (TUMS) chewable tablet 500 mg  500 mg Oral Daily Angelika Matthews MD   500 mg at 10/06/20 6127    ferrous sulfate (IRON 325) tablet 325 mg  325 mg Oral Daily with breakfast Angelika Matthews MD   325 mg at 10/07/20 0810    melatonin tablet 6 mg  6 mg Oral Daily Angelika Matthews MD   6 mg at 10/06/20 2113    mesalamine (PENTASA) extended release capsule 500 mg  500 mg Oral 4x Daily Angelika Matthews MD   500 mg at 10/07/20 1251    ondansetron (ZOFRAN) tablet 4 mg  4 mg Oral Q6H PRN Angelika Matthews MD        sotalol (BETAPACE) tablet 120 mg  120 mg Oral BID Angelika Matthews MD   120 mg at 10/07/20 0809    sodium chloride flush 0.9 % injection 10 mL  10 mL Intravenous 2 times per day Angelika Matthews MD   10 mL at 10/07/20 0811    sodium chloride flush 0.9 % injection 10 mL  10 mL Intravenous PRN Mayco Hinds MD [place]        acetaminophen (TYLENOL) tablet 650 mg  650 mg Oral Q6H PRN Sameer Rosales MD        Or    acetaminophen (TYLENOL) suppository 650 mg  650 mg Rectal Q6H PRN Sameer Rosales MD        [facility]) tablet 8.6 mg  1 tablet Oral Daily PRN Sameer Rosales MD           Allergies: Allergies   Allergen Reactions    Codeine        Problem List:    Patient Active Problem List   Diagnosis Code    Septic shock ([facility] [address] A41.9, R65.21    Hyponatremia E87.1    Diarrhea R19.7    UTI (urinary tract infection) with pyuria N39.0    Anemia D64.9    Leukocytosis D72.829    Acidosis E87.2    Hypotension I95.9       Past Medical History:        Diagnosis Date    Anemia     [facility]     Colitis     Hypertension        Past Surgical History:  History reviewed. No pertinent surgical history. Social History:    Social History     Tobacco Use    Smoking status: Never Smoker    Smokeless tobacco: Never Used   Substance Use Topics    Alcohol use: Never     Frequency: Never                                Counseling given: Not Answered      Vital Signs (Current):   Vitals:    10/07/20 0551 10/07/20 0657 10/07/20 0830 10/07/20 1330   BP: (!) 107/57 (!) 102/54 129/60 125/76   Pulse: 86 83 89 124   Resp: 16 16 15 16   Temp: 98.9 °F (37.2 °C) 98.7 °F (37.1 °C) 98.6 °F (37 °C) 97.8 °F (36.6 °C)   TempSrc: Oral Oral Oral Oral   SpO2: 97% 96% 90% 96%   Weight:       Height:                                                  BP Readings from Last 3 Encounters:   10/07/20 125/76       NPO Status: Time of last liquid consumption: 2330                        Time of last solid consumption: 0900                        Date of last liquid consumption: 10/06/20                        Date of last solid food consumption: 10/07/20    BMI:   Wt Readings from Last 3 Encounters:   10/07/20 208 lb 6.4 oz (94.5 kg)     Body mass index is 33.64 kg/m².     CBC:   Lab Results   Component Value Date WBC 21.4 10/07/2020    RBC 2.62 10/07/2020    HGB 7.6 10/07/2020    HCT 23.3 10/07/2020    MCV 88.9 10/07/2020    RDW 19.5 10/07/2020     10/07/2020       CMP:   Lab Results   Component Value Date     10/07/2020    K 3.3 10/07/2020    K 3.6 10/06/2020     10/07/2020    CO2 19 10/07/2020    BUN 36 10/07/2020    CREATININE 0.9 10/07/2020    GFRAA >60 10/07/2020    LABGLOM 60 10/07/2020    GLUCOSE 84 10/07/2020    PROT 4.6 10/07/2020    CALCIUM 8.1 10/07/2020    BILITOT 1.4 10/07/2020    ALKPHOS 78 10/07/2020    AST 10 10/07/2020    ALT 7 10/07/2020       POC Tests: No results for input(s): POCGLU, POCNA, POCK, POCCL, POCBUN, POCHEMO, POCHCT in the last 72 hours. Coags:   Lab Results   Component Value Date    PROTIME 18.3 10/07/2020    INR 1.6 10/07/2020    APTT 58.6 10/07/2020       HCG (If Applicable): No results found for: PREGTESTUR, PREGSERUM, HCG, HCGQUANT     ABGs: No results found for: PHART, PO2ART, YVO5OMZ, AQS0GVZ, BEART, L8CSOYLW     Type & Screen (If Applicable):  No results found for: LABABO, LABRH    Drug/Infectious Status (If Applicable):  No results found for: HIV, HEPCAB    COVID-19 Screening (If Applicable):   Lab Results   Component Value Date    COVID19 Not Detected 10/04/2020         Anesthesia Evaluation  Patient summary reviewed no history of anesthetic complications:   Airway: Mallampati: III  TM distance: <3 FB   Neck ROM: limited  Mouth opening: < 3 FB Dental:    (+) edentulous      Pulmonary:Negative Pulmonary ROS breath sounds clear to auscultation                             Cardiovascular:    (+) hypertension:, dysrhythmias: atrial fibrillation,         Rhythm: regular                      Neuro/Psych:   Negative Neuro/Psych ROS              GI/Hepatic/Renal:   (+) renal disease (QI):,          ROS comment: Colitis   Abdominal pain  Diarrhea  c-diff  .    Endo/Other:                      ROS comment: Septic shock Abdominal:           Vascular: negative vascular ROS.                                 Anesthesia Plan      general     ASA 4     (Son aware of possible need for post-op ventilation. Patient has central line and mid-line access in place)  Induction: intravenous. MIPS: Postoperative opioids intended, Prophylactic antiemetics administered and Postoperative trial extubation. Anesthetic plan and risks discussed with patient and child/children. Plan discussed with CRNA.         [address] Kian Hassan,    10/7/2020

## 2020-10-07 NOTE — PROGRESS NOTES
Speech Language Pathology      NAME:  Mahendra Sage  :  1935  DATE: 10/7/2020  ROOM:  6916/0852-J         Attempted to complete video swallow eval in AM. Pt c/o of pain in abdomen and states that she will not be able to sit upright. Also stated that she would not be able to eat at this time. Nursing notified and transport contacted to return pt to room. Septic shock ([facility] [A41.9, R65.21]  Septic shock ([facility] [A41.9, R65.21]        Selena FRANZ. JEANNINE/SLP [id number]  Speech-Language Pathologist

## 2020-10-07 NOTE — PROGRESS NOTES
SURGERY  DAILY PROGRESS NOTE  10/7/2020        Chief Complaint   Patient presents with    Hypotension    Diarrhea       Subjective: On PO vanc. Still with diarrhea. Still with abdominal pain     Objective:  BP (!) 107/57   Pulse 86   Temp 98.9 °F (37.2 °C) (Oral)   Resp 16   Ht 5' 6\" (1.676 m)   Wt 208 lb 6.4 oz (94.5 kg)   SpO2 97%   BMI 33.64 kg/m²     GENERAL:  Laying in bed, awake, alert, cooperative, no apparent distress  LUNGS:  No increased work of breathing  CARDIOVASCULAR:  Regular rate   ABDOMEN:  Soft,  RUQ and RLQ, non distended      Assessment/Plan:  82 y.o. female with septic shock now resolving and c.  Diff colitis     -  Improving, off pressure support  - Continue to monitor abdominal exam  - PO vancomycin   - Appreciate consultant recommendations  - no acute surgical intervention planned at this time    Electronically signed by Terri Holt MD on 10/7/2020 at 6:24 AM

## 2020-10-07 NOTE — PROGRESS NOTES
[address] [place] [address] [facility]  [facility]  Progress Note    SUBJECTIVE:  Chief Complaint   Patient presents with    Hypotension    Diarrhea     The patient was transferred out of the ICU. Complaining of abdominal pain. Review of systems:  As stated above in the chief complaint, otherwise negative. Medications:  Scheduled Meds:   metroNIDAZOLE  500 mg Intravenous Q8H    vancomycin  250 mg Oral 4 times per day    pantoprazole  40 mg Intravenous Daily    enoxaparin  30 mg Subcutaneous Daily    albumin human  25 g Intravenous Q6H    amitriptyline  10 mg Oral Nightly    calcium carbonate  500 mg Oral Daily    ferrous sulfate  325 mg Oral Daily with breakfast    melatonin  6 mg Oral Daily    mesalamine  500 mg Oral 4x Daily    sotalol  120 mg Oral BID    sodium chloride flush  10 mL Intravenous 2 times per day     Continuous Infusions:   sodium chloride      sodium chloride 100 mL/hr at 10/06/20 2322     PRN Meds:acetaminophen, ondansetron, sodium chloride flush, acetaminophen **OR** acetaminophen, senna    OBJECTIVE:  /60   Pulse 89   Temp 98.6 °F (37 °C) (Oral)   Resp 15   Ht 5' 6\" (1.676 m)   Wt 208 lb 6.4 oz (94.5 kg)   SpO2 90%   BMI 33.64 kg/m²   Temp  Av.3 °F (36.8 °C)  Min: 97.7 °F (36.5 °C)  Max: 98.9 °F (37.2 °C)  Constitutional: The patient is lying in bed. She seems somewhat sedated but was arousable. Son in room. Skin: Warm and dry. No rashes were noted. HEENT: Round and reactive pupils. Moist mucous membranes. No ulcerations or thrush. Neck: Supple to movements. Chest: No respiratory distress. Good breath sounds. No crackles. Cardiovascular: Heart sounds rhythmic and regular. Abdomen: Slightly distended. Minimally tympanitic. Diminished bowel sounds. Diffusely tender to palpation. Extremities: No edema. Lines: Right IJ TLC 10/4/2020. Martin catheter with clear urine.     Laboratory and Tests Review:  Lab Results   Component Value Date WBC 21.7 (H) 10/07/2020    WBC 30.2 (H) 10/06/2020    WBC 32.6 (H) 10/05/2020    HGB 6.7 (L) 10/07/2020    HCT 20.8 (L) 10/07/2020    MCV 86.0 10/07/2020     10/07/2020     Lab Results   Component Value Date    NEUTROABS 19.08 (H) 10/07/2020    NEUTROABS 27.78 (H) 10/06/2020    NEUTROABS 26.74 (H) 10/05/2020     No results found for: [facility]  Lab Results   Component Value Date    ALT 8 10/07/2020    AST 10 10/07/2020    ALKPHOS 77 10/07/2020    BILITOT 1.0 10/07/2020     Lab Results   Component Value Date     10/07/2020    K 3.3 10/07/2020    K 3.6 10/06/2020     10/07/2020    CO2 19 10/07/2020    BUN 36 10/07/2020    CREATININE 0.9 10/07/2020    CREATININE 1.1 10/06/2020    CREATININE 1.4 10/06/2020    GFRAA >60 10/07/2020    LABGLOM 60 10/07/2020    GLUCOSE 73 10/07/2020    PROT 4.6 10/07/2020    LABALBU 3.2 10/07/2020    CALCIUM 7.5 10/07/2020    BILITOT 1.0 10/07/2020    ALKPHOS 77 10/07/2020    AST 10 10/07/2020    ALT 8 10/07/2020     Lab Results   Component Value Date    CRP 14.3 (H) 10/06/2020    CRP 17.5 (H) 10/05/2020     Lab Results   Component Value Date    SEDRATE 6 10/06/2020    SEDRATE 10 10/05/2020     Radiology:  Ultrasound reviewed. Positive Hopson sign but no gallbladder thickening    Microbiology:   Nares screen for MRSA: Negative  Urine culture 10/4/2020 <10,000 E. coli, Corynebacterium, yeast  Blood cultures 10/4/2020: Negative so far  Stools for C. difficile: Positive    Recent Labs     10/05/20  0600   PROCAL 30.06*       ASSESSMENT:  · Severe C. difficile infection with colitis  · Leukocytosis associated to the above, slowly improving  · Possible UTI, being treated with Ceftriaxone at the nursing facility. Urine culture is negative here  · Acute kidney injury, improving    PLAN:  · Continue IV Metronidazole and oral Vancomycin  · Watch out for ileus and toxic megacolon.   Surgery following  · Monitor labs    Discussed with nursing    Marvetta Severs  10:00 AM  10/7/2020

## 2020-10-07 NOTE — PROGRESS NOTES
changes noted            Recent Labs     10/06/20  1820 10/07/20  0600 10/07/20  1230    137 138   K 3.6 3.3* 3.3*   CL 99 104 105   CO2 19* 19* 19*   BUN 39* 36* 36*   CREATININE 1.1* 0.9 0.9   GLUCOSE 88 73* 84   CALCIUM 7.4* 7.5* 8.1*       Recent Labs     10/06/20  0605  10/07/20  0123 10/07/20  0600 10/07/20  1230   WBC 30.2*  --   --  21.7* 21.4*   RBC 2.57*  --   --  2.42* 2.62*   HGB 7.3*   < > 7.0* 6.7* 7.6*   HCT 22.3*   < > 21.4* 20.8* 23.3*   MCV 86.8  --   --  86.0 88.9   MCH 28.4  --   --  27.7 29.0   MCHC 32.7  --   --  32.2 32.6   RDW 18.5*  --   --  19.1* 19.5*     --   --  149 116*   MPV 8.8  --   --  9.0 9.4    < > = values in this interval not displayed.        CBC with Differential:    Lab Results   Component Value Date    WBC 21.4 10/07/2020    RBC 2.62 10/07/2020    HGB 7.6 10/07/2020    HCT 23.3 10/07/2020     10/07/2020    MCV 88.9 10/07/2020    MCH 29.0 10/07/2020    MCHC 32.6 10/07/2020    RDW 19.5 10/07/2020    METASPCT 3.0 10/06/2020    LYMPHOPCT 6.0 10/07/2020    PROMYELOPCT 1.0 10/06/2020    MONOPCT 2.0 10/07/2020    MYELOPCT 1.0 10/06/2020    BASOPCT 0.1 10/07/2020    MONOSABS 0.43 10/07/2020    LYMPHSABS 1.31 10/07/2020    EOSABS 0.00 10/07/2020    BASOSABS 0.02 10/07/2020     Hemoglobin/Hematocrit:    Lab Results   Component Value Date    HGB 7.6 10/07/2020    HCT 23.3 10/07/2020     CMP:    Lab Results   Component Value Date     10/07/2020    K 3.3 10/07/2020    K 3.6 10/06/2020     10/07/2020    CO2 19 10/07/2020    BUN 36 10/07/2020    CREATININE 0.9 10/07/2020    GFRAA >60 10/07/2020    LABGLOM 60 10/07/2020    GLUCOSE 84 10/07/2020    PROT 4.6 10/07/2020    LABALBU 3.3 10/07/2020    CALCIUM 8.1 10/07/2020    BILITOT 1.4 10/07/2020    ALKPHOS 78 10/07/2020    AST 10 10/07/2020    ALT 7 10/07/2020     Magnesium:    Lab Results   Component Value Date    MG 2.1 10/07/2020     Phosphorus:    Lab Results   Component Value Date    PHOS 2.4 10/07/2020 Radiology:   XR ABDOMEN (KUB) (SINGLE AP VIEW)   Final Result   Transverse, descending, and sigmoid colon mural thickening may reflect edema   and/or inflammation/infection, without significant interval change. Slight prominence of gas throughout the small intestine, without gross   distension, suggestive of paralytic ileus. Persisting left lung base opacity may be atelectasis with pleural effusion. Differential again includes infectious infiltrate         XR ABDOMEN (KUB) (SINGLE AP VIEW)   Final Result   Thickening of the transverse colon wall. Consistent with inflammation   identified on prior CT. US GALLBLADDER RUQ   Final Result   Cholelithiasis with positive sonographic Hopson's sign but no wall thickening   or pericholecystic inflammatory changes, altogether equivocal for   cholecystitis. Consider functional analysis with nuclear medicine HIDA scan. Hepatic steatosis. 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 Suspected small right pleural effusion. CT ABDOMEN PELVIS W IV CONTRAST Additional Contrast? None   Final Result   Addendum 1 of 1   ADDENDUM:   In the title of the examination, disregard the CT abdomen and pelvis. Final      CT ABDOMEN PELVIS W IV CONTRAST Additional Contrast? None   Final Result   Diffuse inflammation of the transverse colon, descending and rectosigmoid   colon with wall thickening and edema with active contrast   extravasation/bleeding. Hemorrhagic diverticulitis or colitis are   considered. There is some edema in the presacral area and tiny amount of air   collection in the rectovesical pouch. Walled-off microperforation is   considered. Distended gallbladder. Atelectasis/pleural effusion in the left lung base.       RECOMMENDATIONS:   The clinical service was notified         XR CHEST PORTABLE   Final Result   Addendum 1 of 1   ADDENDUM:   In the title of the examination, disregard the CT abdomen and pelvis. Final      XR CHEST 1 VIEW   Final Result   Suspect left pleural effusion. No airspace consolidation. Dual-chamber   pacemaker in place. Follow-up PA and lateral radiographs may be helpful in   further evaluation. FL MODIFIED BARIUM SWALLOW W VIDEO    (Results Pending)       Assessment:    Active Problems:    Septic shock (HCC)    Hyponatremia    Diarrhea    UTI (urinary tract infection) with pyuria    Anemia    Leukocytosis    Acidosis    Hypotension  Resolved Problems:    * No resolved hospital problems. *      Plan:  1. Hypotension (septic shock vs hypovolemia) NICOM noted to not be fluid responsive although hx does suggest volume loss. intensivist following. Pressors per intensivist.   2. sepsis(leukocytosis, RR noted to go up to 21 and 33, infection)POA supportive care and tx underlying infection  3. uti possibly due to catheter treatment started as outpt and continued here for now. ID following. 4. C. Diff colitis, severe  abx per ID   Surgery following. Gi following  5. UC less likely but will continue mesalamine per GI. Polly Hathaway bx reviewed and appears to be idiopathic colitis on bx. D/w gi. Pseudomembranous colitis likely and recommended surgical consult with notation of microperforation  6. Hyponatremia pt given fluids already will continue to closely monitor Na and will ask renal to see. 7. mary renal following  Fluids per renal  8.  leukocytosis possibly multifactorial monitor closely  9. Atelectasis contributing to leukocytosis  Will order IS  10. Anemia will monitor closely may drop given hydration  11. Acidosis/lactic acidosis monitor  12. Hypoalbuminemia edema likely due to third spacing  13. Pleural effusion monitor low albumin may also be contributing  14. Atrial fib continue sotalol  15. htn stop lisinopril  Monitor bp  16. Hypokalemia monitor and replace prn    D/w surgery and updated on pt's status and possible need for surgery.      Chart reviewed and updated by nursing    Time spent is 35 min          Electronically signed by Rodo Wise DO on 10/7/2020 at 1:36 PM

## 2020-10-07 NOTE — PROGRESS NOTES
PROGRESS NOTE    Patient Presents with/Seen in Consultation For      *Ulcerative colitis hx, GI bleed   CHIEF COMPLAINT:  Diarrhea and hypotension    Subjective:     Patient intubated on ventilator with Fentanyl gtt and pressors infusing. Shakes head no when spoken to. Review of Systems  Aside from what was mentioned in the PMH and HPI, essentially unremarkable, all others negative. Objective:     BP (!) 105/46   Pulse 134   Temp 98.1 °F (36.7 °C) (Axillary)   Resp 20   Ht 5' 6\" (1.676 m)   Wt 208 lb 6.4 oz (94.5 kg)   SpO2 96%   BMI 33.64 kg/m²     General appearance: sedated, intubated on ventilator, pale, ill appearing, and cooperative  Eyes: conjunctiva pale, sclera anicteric. PERRL.   Lungs: on ventilator via ETT, diminished to auscultation bilaterally  Heart: tachycardic rate and rhythm, no murmur, 2+ pulses; 2+ edema all extremities  Abdomen: soft, non-tender; bowel sounds normal; no masses,  no organomegaly  Extremities: extremities with 2+ edema all extremities  Pulses: 2+ and symmetric  Skin: Skin color pale, texture, turgor normal.   Neurologic: SIVA    sodium chloride flush 0.9 % injection 10 mL, BID  vasopressin 20 Units in dextrose 5 % 100 mL infusion, Continuous  0.9 % sodium chloride bolus, Once  norepinephrine (LEVOPHED) 16 mg in dextrose 5 % 250 mL infusion, Continuous  fentaNYL 5 mcg/ml in 0.9%  ml infusion, Continuous  propofol injection, Continuous  lubrifresh P.M. (artificial tears) ophthalmic ointment, PRN  chlorhexidine (PERIDEX) 0.12 % solution 15 mL, BID  phenylephrine (HUGO-SYNEPHRINE) 50 mg in dextrose 5 % 250 mL infusion, Continuous  sodium bicarbonate 150 mEq in dextrose 5 % 1,000 mL infusion, Continuous  metronidazole (FLAGYL) 500 mg in NaCl 100 mL IVPB premix, Q8H  pantoprazole (PROTONIX) injection 40 mg, Daily  enoxaparin (LOVENOX) injection 30 mg, Daily         Data Review  CBC:   Lab Results   Component Value Date    WBC 33.2 10/07/2020    RBC 4.01 10/07/2020    HGB 11.8 10/07/2020    HCT 36.8 10/07/2020    MCV 91.8 10/07/2020    MCH 29.4 10/07/2020    MCHC 32.1 10/07/2020    RDW 17.7 10/07/2020    PLT 73 10/07/2020    MPV 9.3 10/07/2020     CMP:    Lab Results   Component Value Date     10/07/2020    K 4.0 10/07/2020    K 3.6 10/06/2020     10/07/2020    CO2 17 10/07/2020    BUN 34 10/07/2020    CREATININE 0.9 10/07/2020    GFRAA >60 10/07/2020    LABGLOM 60 10/07/2020    GLUCOSE 124 10/07/2020    PROT 4.6 10/07/2020    LABALBU 3.3 10/07/2020    CALCIUM 7.3 10/07/2020    BILITOT 1.4 10/07/2020    ALKPHOS 78 10/07/2020    AST 10 10/07/2020    ALT 7 10/07/2020     Hepatic Function Panel:    Lab Results   Component Value Date    ALKPHOS 78 10/07/2020    ALT 7 10/07/2020    AST 10 10/07/2020    PROT 4.6 10/07/2020    BILITOT 1.4 10/07/2020    BILIDIR 0.4 10/05/2020    IBILI 0.2 10/05/2020    LABALBU 3.3 10/07/2020       PT/INR:    Lab Results   Component Value Date    PROTIME 18.3 10/07/2020    INR 1.6 10/07/2020     IRON:    Lab Results   Component Value Date    IRON 16 10/07/2020     FERRITIN:    Lab Results   Component Value Date    FERRITIN 323 10/07/2020         Assessment:     Active Problems:  ? Pseudomembranous colitis likely secondary to C. difficile    ? Bloody diarrhea  ? Abdominal pain, diffuse >RLQ  ? Unintentional weight  ? Hypotension on Levophed-defer to ICU  ? Weakness and fatigue  ? Severe leukocytosis -ID following  ? Hyponatremia, hypochloremia-defer to PCP  ? UTI with sepsis-defer to ID  ? Diverticulosis with recent diverticulitis  ? Walled off microperforation per CT  ? Anemia, microcytic, hypochromic  ? Dehydration with QI  ? Total colectomy with abthera open abdominal dressing 10/7/2020    Plan:     ? Management surgical at this time, sign off per Dr. Claudette Muñoz. Please call if needed. Note: This report was completed utilizing computer voice recognition software.  Every effort has been made to ensure accuracy, however; inadvertent computerized transcription errors may be present.      Discussed with Dr. Dave Bob per Dr. Flip Kahn BLKH-DLGA-NT, FNP-BC 10/8/2020 5:15 AM For Dr. Rosa aPntoja

## 2020-10-07 NOTE — PROGRESS NOTES
Patient seen and examined  Pain significantly worse than this morning  Peritonitis and rigidity on abdominal exam    Plans for OR today for total colectomy with end ileostomy     Electronically signed by Mandeep Baxter MD on 10/7/2020 at 1:15 PM      Attending Physician Statement:    Chief Complaint:   Chief Complaint   Patient presents with    Hypotension    Diarrhea       I have examined the patient and performed the key aspects of physical exam, reviewed the record (including all pertinent and new radiology images and laboratory findings), and discussed the case with the surgical team.  I agree with the assessment and plan with the following additions, corrections, and changes. 14pt review of symptoms completed and negative except as mentioned. Exam worsened since yesterday. And complaining of 10/10 abd pain. She has diffuse guarding and some localized rebound. XR with colon at 6.4cm, which is just above threshold for toxic megacolon. WBC down since yesterday, but still at 21. She is now tachycardic. Plan for OR. Discussed with Son. Anibal Vargas MD  10/07/20  2:04 PM    NOTE: This report, in part or full, may have been transcribed using voice recognition software. Every effort was made to ensure accuracy; however, inadvertent computerized transcription errors may be present. Please excuse any transcriptional grammatical or spelling errors that may have escaped my editorial review.

## 2020-10-07 NOTE — CARE COORDINATION
From [place] PTA- plan remains to return to [place] on discharge- will need precert- awaiting PT/OT evals which were not completed today d/t worsening clinical status and need to transfer to OR for STAT procedure. Will follow along with  and assist with discharge planning as necessary. Raul Ramsay.  Hugo, MSN, RN  [facility]  [phone]

## 2020-10-07 NOTE — PROGRESS NOTES
Occupational Therapy  OT order received and chart reviewed. Plan for OR today, will follow post procedure. Thank you.     Liza Joshi OTR/L  [id number]

## 2020-10-08 ENCOUNTER — APPOINTMENT (OUTPATIENT)
Dept: GENERAL RADIOLOGY | Age: 85
DRG: 853 | End: 2020-10-08
Payer: MEDICARE

## 2020-10-08 ENCOUNTER — CLINICAL DOCUMENTATION (OUTPATIENT)
Dept: SURGERY | Age: 85
End: 2020-10-08

## 2020-10-08 ENCOUNTER — ANESTHESIA EVENT (OUTPATIENT)
Dept: OPERATING ROOM | Age: 85
End: 2020-10-08

## 2020-10-08 LAB
AADO2: 102.4 MMHG
ABO/RH: NORMAL
ABO/RH: NORMAL
ALBUMIN SERPL-MCNC: 2.3 G/DL (ref 3.5–5.2)
ALP BLD-CCNC: 105 U/L (ref 35–104)
ALT SERPL-CCNC: 11 U/L (ref 0–32)
ANION GAP SERPL CALCULATED.3IONS-SCNC: 10 MMOL/L (ref 7–16)
ANION GAP SERPL CALCULATED.3IONS-SCNC: 12 MMOL/L (ref 7–16)
ANISOCYTOSIS: ABNORMAL
ANTIBODY SCREEN: NORMAL
AST SERPL-CCNC: 23 U/L (ref 0–31)
B.E.: -7.5 MMOL/L (ref -3–3)
B.E.: -8.9 MMOL/L (ref -3–0)
BASOPHILS ABSOLUTE: 0 E9/L (ref 0–0.2)
BASOPHILS RELATIVE PERCENT: 0 % (ref 0–2)
BILIRUB SERPL-MCNC: 2.2 MG/DL (ref 0–1.2)
BUN BLDV-MCNC: 29 MG/DL (ref 8–23)
BUN BLDV-MCNC: 32 MG/DL (ref 8–23)
BURR CELLS: ABNORMAL
CALCIUM IONIZED: 0.95 MMOL/L (ref 1.15–1.33)
CALCIUM SERPL-MCNC: 6.2 MG/DL (ref 8.6–10.2)
CALCIUM SERPL-MCNC: 7.1 MG/DL (ref 8.6–10.2)
CHLORIDE BLD-SCNC: 108 MMOL/L (ref 98–107)
CHLORIDE BLD-SCNC: 109 MMOL/L (ref 98–107)
CO2: 18 MMOL/L (ref 22–29)
CO2: 18 MMOL/L (ref 22–29)
COHB: 0.3 % (ref 0–1.5)
CORTISOL TOTAL: 68.5 MCG/DL (ref 2.68–18.4)
CREAT SERPL-MCNC: 0.9 MG/DL (ref 0.5–1)
CREAT SERPL-MCNC: 1 MG/DL (ref 0.5–1)
CRITICAL: ABNORMAL
CULTURE, STOOL: NORMAL
DATE ANALYZED: ABNORMAL
DATE OF COLLECTION: ABNORMAL
DEVICE: ABNORMAL
DOHLE BODIES: ABNORMAL
EOSINOPHILS ABSOLUTE: 0 E9/L (ref 0.05–0.5)
EOSINOPHILS RELATIVE PERCENT: 0 % (ref 0–6)
FIO2 ARTERIAL: 40
FIO2: 40 %
GFR AFRICAN AMERICAN: >60
GFR AFRICAN AMERICAN: >60
GFR NON-AFRICAN AMERICAN: 53 ML/MIN/1.73
GFR NON-AFRICAN AMERICAN: 60 ML/MIN/1.73
GLUCOSE BLD-MCNC: 200 MG/DL (ref 74–99)
GLUCOSE BLD-MCNC: 258 MG/DL (ref 74–99)
GRAM STAIN ORDERABLE: NORMAL
HCO3 ARTERIAL: 15.9 MMOL/L (ref 22–26)
HCO3: 17.8 MMOL/L (ref 22–26)
HCT VFR BLD CALC: 27.7 % (ref 34–48)
HCT VFR BLD CALC: 34.6 % (ref 34–48)
HEMOGLOBIN: 11.3 G/DL (ref 11.5–15.5)
HEMOGLOBIN: 9.3 G/DL (ref 11.5–15.5)
HHB: 1.5 % (ref 0–5)
LAB: ABNORMAL
LACTIC ACID: 5.6 MMOL/L (ref 0.5–2.2)
LACTIC ACID: 6.2 MMOL/L (ref 0.5–2.2)
LACTIC ACID: 6.9 MMOL/L (ref 0.5–2.2)
LYMPHOCYTES ABSOLUTE: 1.57 E9/L (ref 1.5–4)
LYMPHOCYTES RELATIVE PERCENT: 3 % (ref 20–42)
Lab: ABNORMAL
MAGNESIUM: 1.9 MG/DL (ref 1.6–2.6)
MCH RBC QN AUTO: 29.5 PG (ref 26–35)
MCHC RBC AUTO-ENTMCNC: 32.7 % (ref 32–34.5)
MCV RBC AUTO: 90.3 FL (ref 80–99.9)
METAMYELOCYTES RELATIVE PERCENT: 13 % (ref 0–1)
METHB: 0.2 % (ref 0–1.5)
MODE: AC
MODE: AC
MONOCYTES ABSOLUTE: 1.05 E9/L (ref 0.1–0.95)
MONOCYTES RELATIVE PERCENT: 2 % (ref 2–12)
MYELOCYTE PERCENT: 3 % (ref 0–0)
NEUTROPHILS ABSOLUTE: 48.21 E9/L (ref 1.8–7.3)
NEUTROPHILS RELATIVE PERCENT: 76 % (ref 43–80)
O2 CONTENT: 14.4 ML/DL
O2 SATURATION: 98.5 % (ref 92–98.5)
O2 SATURATION: 99.2 % (ref 92–98.5)
O2HB: 98 % (ref 94–97)
OPERATOR ID: 1023
OPERATOR ID: 913
PATIENT TEMP: 37
PATIENT TEMP: 37 C
PCO2 (TEMP CORRECTED): 30.5 MMHG (ref 35–45)
PCO2: 35.1 MMHG (ref 35–45)
PDW BLD-RTO: 17.7 FL (ref 11.5–15)
PEEP/CPAP: 5 CMH2O
PFO2: 3.31 MMHG/%
PH (TEMPERATURE CORRECTED): 7.33 (ref 7.35–7.45)
PH BLOOD GAS: 7.32 (ref 7.35–7.45)
PHOSPHORUS: 2.6 MG/DL (ref 2.5–4.5)
PLATELET # BLD: 51 E9/L (ref 130–450)
PLATELET CONFIRMATION: NORMAL
PMV BLD AUTO: 10.3 FL (ref 7–12)
PO2 (TEMP CORRECTED): 151.2 MMHG (ref 60–80)
PO2: 132.4 MMHG (ref 75–100)
POIKILOCYTES: ABNORMAL
POSITIVE END EXP PRESS: 5 CMH2O
POTASSIUM REFLEX MAGNESIUM: 3.8 MMOL/L (ref 3.5–5)
POTASSIUM SERPL-SCNC: 3.3 MMOL/L (ref 3.5–5)
PROMYELOCYTES PERCENT: 3 % (ref 0–0)
RBC # BLD: 3.83 E12/L (ref 3.5–5.5)
RESPIRATORY RATE: 16 B/MIN
RI(T): 77 %
RR MECHANICAL: 16 B/MIN
SODIUM BLD-SCNC: 136 MMOL/L (ref 132–146)
SODIUM BLD-SCNC: 139 MMOL/L (ref 132–146)
SOURCE, BLOOD GAS: ABNORMAL
SOURCE, BLOOD GAS: ABNORMAL
THB: 10.3 G/DL (ref 11.5–16.5)
TIDAL VOLUME: 400 ML
TIME ANALYZED: 1611
TOTAL PROTEIN: 3.8 G/DL (ref 6.4–8.3)
TOXIC GRANULATION: ABNORMAL
VT MECHANICAL: 400 ML
WBC # BLD: 52.4 E9/L (ref 4.5–11.5)

## 2020-10-08 PROCEDURE — 2500000003 HC RX 250 WO HCPCS: Performed by: FAMILY MEDICINE

## 2020-10-08 PROCEDURE — 6370000000 HC RX 637 (ALT 250 FOR IP): Performed by: STUDENT IN AN ORGANIZED HEALTH CARE EDUCATION/TRAINING PROGRAM

## 2020-10-08 PROCEDURE — 6360000002 HC RX W HCPCS: Performed by: FAMILY MEDICINE

## 2020-10-08 PROCEDURE — C9113 INJ PANTOPRAZOLE SODIUM, VIA: HCPCS | Performed by: STUDENT IN AN ORGANIZED HEALTH CARE EDUCATION/TRAINING PROGRAM

## 2020-10-08 PROCEDURE — 83735 ASSAY OF MAGNESIUM: CPT

## 2020-10-08 PROCEDURE — 82805 BLOOD GASES W/O2 SATURATION: CPT

## 2020-10-08 PROCEDURE — 80053 COMPREHEN METABOLIC PANEL: CPT

## 2020-10-08 PROCEDURE — 86900 BLOOD TYPING SEROLOGIC ABO: CPT

## 2020-10-08 PROCEDURE — 99233 SBSQ HOSP IP/OBS HIGH 50: CPT | Performed by: INTERNAL MEDICINE

## 2020-10-08 PROCEDURE — 2000000000 HC ICU R&B

## 2020-10-08 PROCEDURE — 6360000002 HC RX W HCPCS: Performed by: INTERNAL MEDICINE

## 2020-10-08 PROCEDURE — 2580000003 HC RX 258: Performed by: STUDENT IN AN ORGANIZED HEALTH CARE EDUCATION/TRAINING PROGRAM

## 2020-10-08 PROCEDURE — 85025 COMPLETE CBC W/AUTO DIFF WBC: CPT

## 2020-10-08 PROCEDURE — 82330 ASSAY OF CALCIUM: CPT

## 2020-10-08 PROCEDURE — 94003 VENT MGMT INPAT SUBQ DAY: CPT

## 2020-10-08 PROCEDURE — P9035 PLATELET PHERES LEUKOREDUCED: HCPCS

## 2020-10-08 PROCEDURE — 85018 HEMOGLOBIN: CPT

## 2020-10-08 PROCEDURE — 37799 UNLISTED PX VASCULAR SURGERY: CPT

## 2020-10-08 PROCEDURE — 36415 COLL VENOUS BLD VENIPUNCTURE: CPT

## 2020-10-08 PROCEDURE — 80048 BASIC METABOLIC PNL TOTAL CA: CPT

## 2020-10-08 PROCEDURE — 83605 ASSAY OF LACTIC ACID: CPT

## 2020-10-08 PROCEDURE — P9059 PLASMA, FRZ BETWEEN 8-24HOUR: HCPCS

## 2020-10-08 PROCEDURE — 82803 BLOOD GASES ANY COMBINATION: CPT

## 2020-10-08 PROCEDURE — 6360000002 HC RX W HCPCS: Performed by: SPECIALIST

## 2020-10-08 PROCEDURE — 71045 X-RAY EXAM CHEST 1 VIEW: CPT

## 2020-10-08 PROCEDURE — 6360000002 HC RX W HCPCS: Performed by: STUDENT IN AN ORGANIZED HEALTH CARE EDUCATION/TRAINING PROGRAM

## 2020-10-08 PROCEDURE — 86923 COMPATIBILITY TEST ELECTRIC: CPT

## 2020-10-08 PROCEDURE — 86901 BLOOD TYPING SEROLOGIC RH(D): CPT

## 2020-10-08 PROCEDURE — 84100 ASSAY OF PHOSPHORUS: CPT

## 2020-10-08 PROCEDURE — 2500000003 HC RX 250 WO HCPCS: Performed by: STUDENT IN AN ORGANIZED HEALTH CARE EDUCATION/TRAINING PROGRAM

## 2020-10-08 PROCEDURE — 2580000003 HC RX 258: Performed by: INTERNAL MEDICINE

## 2020-10-08 PROCEDURE — 2500000003 HC RX 250 WO HCPCS: Performed by: INTERNAL MEDICINE

## 2020-10-08 PROCEDURE — 86850 RBC ANTIBODY SCREEN: CPT

## 2020-10-08 PROCEDURE — 85014 HEMATOCRIT: CPT

## 2020-10-08 PROCEDURE — 2580000003 HC RX 258: Performed by: FAMILY MEDICINE

## 2020-10-08 PROCEDURE — 2580000003 HC RX 258: Performed by: SPECIALIST

## 2020-10-08 RX ORDER — 0.9 % SODIUM CHLORIDE 0.9 %
1000 INTRAVENOUS SOLUTION INTRAVENOUS ONCE
Status: COMPLETED | OUTPATIENT
Start: 2020-10-08 | End: 2020-10-08

## 2020-10-08 RX ORDER — POTASSIUM CHLORIDE 29.8 MG/ML
20 INJECTION INTRAVENOUS
Status: COMPLETED | OUTPATIENT
Start: 2020-10-08 | End: 2020-10-08

## 2020-10-08 RX ORDER — SODIUM CHLORIDE AND POTASSIUM CHLORIDE .9; .15 G/100ML; G/100ML
SOLUTION INTRAVENOUS CONTINUOUS
Status: DISCONTINUED | OUTPATIENT
Start: 2020-10-08 | End: 2020-10-10

## 2020-10-08 RX ORDER — SODIUM CHLORIDE 9 MG/ML
INJECTION, SOLUTION INTRAVENOUS EVERY 8 HOURS
Status: DISCONTINUED | OUTPATIENT
Start: 2020-10-08 | End: 2020-10-15

## 2020-10-08 RX ORDER — POTASSIUM CHLORIDE 29.8 MG/ML
40 INJECTION INTRAVENOUS ONCE
Status: DISCONTINUED | OUTPATIENT
Start: 2020-10-08 | End: 2020-10-08

## 2020-10-08 RX ORDER — DIGOXIN 0.25 MG/ML
250 INJECTION INTRAMUSCULAR; INTRAVENOUS
Status: DISCONTINUED | OUTPATIENT
Start: 2020-10-08 | End: 2020-10-10

## 2020-10-08 RX ORDER — SODIUM CHLORIDE 0.9 % (FLUSH) 0.9 %
10 SYRINGE (ML) INJECTION 2 TIMES DAILY
Status: DISCONTINUED | OUTPATIENT
Start: 2020-10-08 | End: 2020-10-15 | Stop reason: HOSPADM

## 2020-10-08 RX ADMIN — VASOPRESSIN 0.04 UNITS/MIN: 20 INJECTION INTRAVENOUS at 19:27

## 2020-10-08 RX ADMIN — Medication 175 MCG/HR: at 21:13

## 2020-10-08 RX ADMIN — PHENYLEPHRINE HYDROCHLORIDE 300 MCG/MIN: 10 INJECTION INTRAVENOUS at 06:26

## 2020-10-08 RX ADMIN — Medication 150 MCG/HR: at 17:08

## 2020-10-08 RX ADMIN — PHENYLEPHRINE HYDROCHLORIDE 200 MCG/MIN: 10 INJECTION INTRAVENOUS at 15:11

## 2020-10-08 RX ADMIN — CHLORHEXIDINE GLUCONATE 0.12% ORAL RINSE 15 ML: 1.2 LIQUID ORAL at 08:27

## 2020-10-08 RX ADMIN — Medication 150 MCG/HR: at 10:26

## 2020-10-08 RX ADMIN — Medication 10 ML: at 01:15

## 2020-10-08 RX ADMIN — SODIUM BICARBONATE: 84 INJECTION, SOLUTION INTRAVENOUS at 00:18

## 2020-10-08 RX ADMIN — METRONIDAZOLE 500 MG: 500 INJECTION, SOLUTION INTRAVENOUS at 11:50

## 2020-10-08 RX ADMIN — SODIUM BICARBONATE: 84 INJECTION, SOLUTION INTRAVENOUS at 14:32

## 2020-10-08 RX ADMIN — PANTOPRAZOLE SODIUM 40 MG: 40 INJECTION, POWDER, FOR SOLUTION INTRAVENOUS at 08:27

## 2020-10-08 RX ADMIN — DEXTROSE MONOHYDRATE 10 MCG/MIN: 50 INJECTION, SOLUTION INTRAVENOUS at 18:23

## 2020-10-08 RX ADMIN — PIPERACILLIN SODIUM AND TAZOBACTAM SODIUM 3.38 G: 3; .375 INJECTION, POWDER, LYOPHILIZED, FOR SOLUTION INTRAVENOUS at 19:52

## 2020-10-08 RX ADMIN — SODIUM CHLORIDE: 9 INJECTION, SOLUTION INTRAVENOUS at 23:15

## 2020-10-08 RX ADMIN — CHLORHEXIDINE GLUCONATE 0.12% ORAL RINSE 15 ML: 1.2 LIQUID ORAL at 22:17

## 2020-10-08 RX ADMIN — METRONIDAZOLE 500 MG: 500 INJECTION, SOLUTION INTRAVENOUS at 05:00

## 2020-10-08 RX ADMIN — POTASSIUM CHLORIDE 20 MEQ: 29.8 INJECTION, SOLUTION INTRAVENOUS at 19:20

## 2020-10-08 RX ADMIN — SODIUM CHLORIDE 1000 ML: 9 INJECTION, SOLUTION INTRAVENOUS at 05:57

## 2020-10-08 RX ADMIN — SODIUM CHLORIDE 1000 ML: 9 INJECTION, SOLUTION INTRAVENOUS at 06:36

## 2020-10-08 RX ADMIN — Medication 25 MCG/HR: at 02:00

## 2020-10-08 RX ADMIN — Medication 10 ML: at 08:49

## 2020-10-08 RX ADMIN — VASOPRESSIN 0.04 UNITS/MIN: 20 INJECTION INTRAVENOUS at 04:42

## 2020-10-08 RX ADMIN — METRONIDAZOLE 500 MG: 500 INJECTION, SOLUTION INTRAVENOUS at 19:52

## 2020-10-08 RX ADMIN — SODIUM CHLORIDE: 9 INJECTION, SOLUTION INTRAVENOUS at 15:20

## 2020-10-08 RX ADMIN — SODIUM BICARBONATE: 84 INJECTION, SOLUTION INTRAVENOUS at 06:35

## 2020-10-08 RX ADMIN — Medication 10 ML: at 22:17

## 2020-10-08 RX ADMIN — PHENYLEPHRINE HYDROCHLORIDE 225 MCG/MIN: 10 INJECTION INTRAVENOUS at 12:45

## 2020-10-08 RX ADMIN — Medication 150 MCG/HR: at 13:59

## 2020-10-08 RX ADMIN — VASOPRESSIN 0.04 UNITS/MIN: 20 INJECTION INTRAVENOUS at 11:50

## 2020-10-08 RX ADMIN — POTASSIUM CHLORIDE 20 MEQ: 29.8 INJECTION, SOLUTION INTRAVENOUS at 18:22

## 2020-10-08 RX ADMIN — PHENYLEPHRINE HYDROCHLORIDE 300 MCG/MIN: 10 INJECTION INTRAVENOUS at 03:12

## 2020-10-08 RX ADMIN — PIPERACILLIN SODIUM AND TAZOBACTAM SODIUM 3.38 G: 3; .375 INJECTION, POWDER, LYOPHILIZED, FOR SOLUTION INTRAVENOUS at 11:50

## 2020-10-08 RX ADMIN — POTASSIUM CHLORIDE AND SODIUM CHLORIDE: 900; 150 INJECTION, SOLUTION INTRAVENOUS at 18:22

## 2020-10-08 ASSESSMENT — PULMONARY FUNCTION TESTS
PIF_VALUE: 18
PIF_VALUE: 20
PIF_VALUE: 18
PIF_VALUE: 17
PIF_VALUE: 18
PIF_VALUE: 16
PIF_VALUE: 17
PIF_VALUE: 18
PIF_VALUE: 20
PIF_VALUE: 19
PIF_VALUE: 20
PIF_VALUE: 18
PIF_VALUE: 19
PIF_VALUE: 21
PIF_VALUE: 21
PIF_VALUE: 16
PIF_VALUE: 18
PIF_VALUE: 14
PIF_VALUE: 19
PIF_VALUE: 20
PIF_VALUE: 19
PIF_VALUE: 19

## 2020-10-08 ASSESSMENT — PAIN SCALES - GENERAL
PAINLEVEL_OUTOF10: 0

## 2020-10-08 NOTE — BRIEF OP NOTE
Brief Postoperative Note      Patient: Jessica Burdick  YOB: 1935  MRN: [id number]    Date of Procedure: 10/7/2020    Pre-Op Diagnosis: fulminant colitis    Post-Op Diagnosis: Same       Procedure(s):  TOTAL COLECTOMY WITH ABTHERA OPEN ABDOMINAL DRESSING    Surgeon(s):  Alis Burdick MD    Assistant:  Resident: Madalyn Vasquez MD PGY4; Chasidy Tony MD PGY2    Anesthesia: General    Estimated Blood Loss (mL): 133     Complications: None    Specimens:   ID Type Source Tests Collected by Time Destination   1 : PERITONEAL FLUID Body Fluid Fluid CULTURE, ANAEROBIC, CULTURE, FUNGUS, GRAM STAIN, CULTURE, BODY FLUID, CULTURE WITH SMEAR, ACID FAST Mohan Mena MD 10/7/2020 1700    A : TOTAL COLON Tissue Tissue SURGICAL PATHOLOGY Alis Burdick MD 10/7/2020 1916        Implants:  * No implants in log *      Drains:   Urethral Catheter 16 fr (Active)   Catheter Indications Need for fluid management in critically ill patients in a critical care setting not able to be managed by other means such as BSC with hat, bedpan, urinal, condom catheter, or short term intermittent urethral catherization 10/07/20 0830   Site Assessment No urethral drainage 10/07/20 0830   Urine Color Glory 10/07/20 0830   Urine Appearance Clear 10/07/20 0830   Output (mL) 150 mL 10/07/20 1332       Findings: 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     Electronically signed by Madalyn Vasquez MD on 10/7/2020 at 8:10 PM

## 2020-10-08 NOTE — ANESTHESIA POSTPROCEDURE EVALUATION
Department of Anesthesiology  Postprocedure Note    Patient: Liliane Valiente  MRN: [id number]  YOB: 1935  Date of evaluation: 10/8/2020  Time:  3:55 AM     Procedure Summary     Date:  10/07/20 Room / Location:  [facility] OR 10 / [facility]    Anesthesia Start:  8037 Anesthesia Stop:  2028    Procedure:  TOTAL COLECTOMY WITH ABTHERA OPEN ABDOMINAL DRESSING (N/A Abdomen) Diagnosis:  (-)    Surgeon:  Ck Sauer MD Responsible Provider:  Andrew Kilgore DO    Anesthesia Type:  general ASA Status:  4          Anesthesia Type: general    Melinda Phase I:      Melinda Phase II:      Last vitals: Reviewed and per EMR flowsheets. Anesthesia Post Evaluation    Patient location during evaluation: ICU  Patient participation: complete - patient cannot participate  Level of consciousness: sedated and ventilated  Airway patency: ENDOTRACHEAL TUBE. Nausea & Vomiting: no nausea and no vomiting  Cardiovascular status: PRESSORS STARTED.   Respiratory status: ventilator  Hydration status: euvolemic

## 2020-10-08 NOTE — PROGRESS NOTES
· Inadequate energy intake related to altered GI structure(s/p total colectomy and current discontinuity) as evidenced by NPO or clear liquid status due to medical condition, intubation, GI abnormality      Nutrition Interventions:   Food and/or Nutrient Delivery:  Continue NPO  Nutrition Education/Counseling:  No recommendation at this time   Coordination of Nutrition Care:  Continued Inpatient Monitoring    Goals:  Nutrition progression       Nutrition Monitoring and Evaluation:   Food/Nutrient Intake Outcomes:  Diet Advancement/Tolerance  Physical Signs/Symptoms Outcomes:  Biochemical Data, GI Status, Hemodynamic Status, Fluid Status or Edema, Nutrition Focused Physical Findings, Skin, Weight     Discharge Planning:     Too soon to determine     Electronically signed by Emmy Homans, RD, CNSC, LD on 10/8/20 at 10:41 AM EDT    Contact: [phone]

## 2020-10-08 NOTE — CONSULTS
Medical: Not on file     Non-medical: Not on file   Tobacco Use    Smoking status: Never Smoker    Smokeless tobacco: Never Used   Substance and Sexual Activity    Alcohol use: Never     Frequency: Never    Drug use: Never    Sexual activity: Not on file   Lifestyle    Physical activity     Days per week: Not on file     Minutes per session: Not on file    Stress: Not on file   Relationships    Social connections     Talks on phone: Not on file     Gets together: Not on file     Attends Restorationism service: Not on file     Active member of club or organization: Not on file     Attends meetings of clubs or organizations: Not on file     Relationship status: Not on file    Intimate partner violence     Fear of current or ex partner: Not on file     Emotionally abused: Not on file     Physically abused: Not on file     Forced sexual activity: Not on file   Other Topics Concern    Not on file   Social History Narrative    Not on file       Allergies: Allergies   Allergen Reactions    Codeine        Home Medications:  Prior to Admission medications    Medication Sig Start Date End Date Taking?  Authorizing Provider   acetaminophen (TYLENOL) 325 MG tablet Take 650 mg by mouth every 6 hours as needed for Pain   Yes Historical Provider, MD   amitriptyline (ELAVIL) 10 MG tablet Take 10 mg by mouth nightly   Yes Historical Provider, MD   dicyclomine (BENTYL) 10 MG capsule Take 10 mg by mouth every 6 hours as needed   Yes Historical Provider, MD   calcium carbonate 600 MG TABS tablet Take 1 tablet by mouth daily   Yes Historical Provider, MD   ferrous sulfate (IRON 325) 325 (65 Fe) MG tablet Take 325 mg by mouth daily (with breakfast)   Yes Historical Provider, MD   lisinopril (PRINIVIL;ZESTRIL) 40 MG tablet Take 40 mg by mouth daily   Yes Historical Provider, MD   melatonin 3 MG TABS tablet Take 6 mg by mouth daily   Yes Historical Provider, MD   tamsulosin (FLOMAX) 0.4 MG capsule Take 0.4 mg by mouth nightly Historical Provider, MD   loperamide (IMODIUM) 2 MG capsule Take 2 mg by mouth every 12 hours as needed for Diarrhea    Historical Provider, MD   mesalamine (PENTASA) 500 MG extended release capsule Take 500 mg by mouth 4 times daily    Historical Provider, MD   cefTRIAXone (ROCEPHIN) 1 g injection Infuse 1 g intravenously every 24 hours    Historical Provider, MD   sotalol (BETAPACE) 120 MG tablet Take 120 mg by mouth 2 times daily    Historical Provider, MD   ondansetron (ZOFRAN) 4 MG tablet Take 4 mg by mouth every 6 hours as needed for Nausea or Vomiting    Historical Provider, MD       Current Medications:  Current Facility-Administered Medications   Medication Dose Route Frequency Provider Last Rate Last Dose    sodium chloride flush 0.9 % injection 10 mL  10 mL Intravenous BID Harjinder Granger MD   10 mL at 10/08/20 0849    vasopressin 20 Units in dextrose 5 % 100 mL infusion  0.04 Units/min Intravenous Continuous Harjinder Granger MD 12 mL/hr at 10/08/20 0442 0.04 Units/min at 10/08/20 0442    norepinephrine (LEVOPHED) 16 mg in dextrose 5 % 250 mL infusion  2 mcg/min Intravenous Continuous Mercedes Morin MD   Stopped at 10/08/20 0615    fentaNYL 5 mcg/ml in 0.9%  ml infusion  25 mcg/hr Intravenous Continuous Mercedes Morin MD 10 mL/hr at 10/08/20 0727 50 mcg/hr at 10/08/20 0727    propofol injection  10 mcg/kg/min Intravenous Continuous Mercedes Morin MD        lubrifresh P.M. (artificial tears) ophthalmic ointment   Both Eyes PRN Mercedes Morin MD        chlorhexidine (PERIDEX) 0.12 % solution 15 mL  15 mL Mouth/Throat BID Mercedes Morin MD   15 mL at 10/08/20 0827    phenylephrine (HUGO-SYNEPHRINE) 50 mg in dextrose 5 % 250 mL infusion  100 mcg/min Intravenous Continuous Harjinder Granger MD 82.5 mL/hr at 10/08/20 0848 275 mcg/min at 10/08/20 0848    sodium bicarbonate 150 mEq in dextrose 5 % 1,000 mL infusion   Intravenous Continuous Harjinder Granger  mL/hr at 10/08/20 2072  metronidazole (FLAGYL) 500 mg in NaCl 100 mL IVPB premix  500 mg Intravenous Q8H Stas Puentes MD   Stopped at 10/08/20 0600    pantoprazole (PROTONIX) injection 40 mg  40 mg Intravenous Daily Stas Puentes MD   40 mg at 10/08/20 0827    [Held by provider] enoxaparin (LOVENOX) injection 30 mg  30 mg Subcutaneous Daily Stas Puentes MD   30 mg at 10/07/20 0816      vasopressin (Septic Shock) infusion 0.04 Units/min (10/08/20 0442)    norepinephrine Stopped (10/08/20 0615)    fentaNYL 5 mcg/ml in 0.9%  ml infusion 50 mcg/hr (10/08/20 0727)    propofol      phenylephrine (HUGO-SYNEPHRINE) 50mg/250mL infusion 275 mcg/min (10/08/20 0848)    sodium bicarbonate infusion 150 mL/hr at 10/08/20 0635       Physical Exam:  BP (!) 105/46   Pulse 117   Temp 97.9 °F (36.6 °C) (Axillary)   Resp 17   Ht 5' 6\" (1.676 m)   Wt 208 lb 6.4 oz (94.5 kg)   SpO2 96%   BMI 33.64 kg/m²   Weight change: Wt Readings from Last 3 Encounters:   10/07/20 208 lb 6.4 oz (94.5 kg)     General: elderly woman laying in bed intubated  HEENT: Normocephalic and atraumatic, extraocular movements intact, pupils equal and round, moist mucus membranes, sclera anicteric  Neck: No JVD, no carotid bruits, no thyromegaly, no adenopathy  Cardiac:irreg irreg , tachy, normal S1 and physiologically split S2, no S3, no S4. Apical impulse is nondisplaced. No murmurs, no pericardial rubs, no clicks. Carotid upstrokes brisk. Respiratory: Clear anteriorly no wheezes, no rales, no rhonchi. On vent  Abdomen: s/p colectomy  Extremities: No edema, no cyanosis, no clubbing.   Distal pulses intact  Skin: Intact, warm and dry, no rashes, no breakdown  Musculoskeletal: normal tone and strength in the upper and lower extremities bilaterally  Neuro:not responsive on propofol at present    Intake/Output:    Intake/Output Summary (Last 24 hours) at 10/8/2020 0930  Last data filed at 10/8/2020 0835  Gross per 24 hour   Intake 5905 ml   Output 1455 ml Net 4450 ml     I/O this shift:  In: -   Out: 15 [Urine:15]    Laboratory Tests:  Last 3 CBC:  Recent Labs     10/07/20  1230 10/07/20  2210 10/08/20  0622   WBC 21.4* 33.2* 52.4*   RBC 2.62* 4.01 3.83   HGB 7.6* 11.8 11.3*   HCT 23.3* 36.8 34.6   MCV 88.9 91.8 90.3   MCH 29.0 29.4 29.5   MCHC 32.6 32.1 32.7   RDW 19.5* 17.7* 17.7*   * 73* 51*   MPV 9.4 9.3 10.3       Last 3 CMP:    Recent Labs     10/07/20  0600 10/07/20  1230 10/07/20  2210 10/08/20  0622    138 137 139   K 3.3* 3.3* 4.0 3.8    105 110* 109*   CO2 19* 19* 17* 18*   BUN 36* 36* 34* 32*   CREATININE 0.9 0.9 0.9 1.0   GLUCOSE 73* 84 124* 200*   CALCIUM 7.5* 8.1* 7.3* 7.1*   PROT 4.6* 4.6*  --  3.8*   LABALBU 3.2* 3.3*  --  2.3*   BILITOT 1.0 1.4*  --  2.2*   ALKPHOS 77 78  --  105*   AST 10 10  --  23   ALT 8 7  --  11       Last 3 Mag/Phos:  Recent Labs     10/06/20  0605 10/07/20  0600 10/08/20  0622   MG 2.1 2.1 1.9   PHOS 3.9 2.4* 2.6       Last 3 CK, CKMB, Troponin  Recent Labs     10/05/20  1200   CKTOTAL 29       Last 3 Pro-BNP:  No results for input(s): PROBNP in the last 72 hours. No results found for: PROBNP    Last 3 Glucose:     Recent Labs     10/07/20  1230 10/07/20  2210 10/08/20  0622   GLUCOSE 84 124* 200*       Last 3 Coags:  Recent Labs     10/07/20  1340   PROTIME 18.3*   INR 1.6     Lab Results   Component Value Date    PROTIME 18.3 10/07/2020    INR 1.6 10/07/2020       Last 3 Lipid Panel:  No results for input(s): LDLCALC, HDL, TRIG in the last 72 hours. Invalid input(s): CHLPL  No results found for: LDLCALC  No results found for: HDL  No results found for: TRIG  No components found for: CHLPL    TSH:  No results for input(s): TSH in the last 72 hours. No results found for: TSH    ABGs:  Recent Labs     10/08/20  0630   BE -8.9*   O2SAT 99.2*       Lactic Acid:  Recent Labs     10/08/20  0622   LACTA 6.9*         Radiology:  RAD Results:  XR CHEST PORTABLE   Preliminary Result   1.  No interval change in the small left pleural effusion and left lung base   atelectasis. 2. No change in the minimal right lung base atelectasis. XR CHEST PORTABLE   Final Result   Opacities are present in left lung base which appear to have increased in   could suggest increasing pneumonia, atelectasis, or effusion. XR CHEST ABDOMEN NG PLACEMENT   Final Result   Satisfactory position of nasogastric tube. XR ABDOMEN (KUB) (SINGLE AP VIEW)   Final Result   Transverse, descending, and sigmoid colon mural thickening may reflect edema   and/or inflammation/infection, without significant interval change. Slight prominence of gas throughout the small intestine, without gross   distension, suggestive of paralytic ileus. Persisting left lung base opacity may be atelectasis with pleural effusion. Differential again includes infectious infiltrate         XR ABDOMEN (KUB) (SINGLE AP VIEW)   Final Result   Thickening of the transverse colon wall. Consistent with inflammation   identified on prior CT. US GALLBLADDER RUQ   Final Result   Cholelithiasis with positive sonographic Hopson's sign but no wall thickening   or pericholecystic inflammatory changes, altogether equivocal for   cholecystitis. Consider functional analysis with nuclear medicine HIDA scan. Hepatic steatosis. 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 Suspected small right pleural effusion. CT ABDOMEN PELVIS W IV CONTRAST Additional Contrast? None   Final Result   Addendum 1 of 1   ADDENDUM:   In the title of the examination, disregard the CT abdomen and pelvis. Final      CT ABDOMEN PELVIS W IV CONTRAST Additional Contrast? None   Final Result   Diffuse inflammation of the transverse colon, descending and rectosigmoid   colon with wall thickening and edema with active contrast   extravasation/bleeding. Hemorrhagic diverticulitis or colitis are   considered.   There is some edema in the presacral area and tiny amount of air   collection in the rectovesical pouch. Walled-off microperforation is   considered. Distended gallbladder. Atelectasis/pleural effusion in the left lung base. RECOMMENDATIONS:   The clinical service was notified         XR CHEST PORTABLE   Final Result   Addendum 1 of 1   ADDENDUM:   In the title of the examination, disregard the CT abdomen and pelvis. Final      XR CHEST 1 VIEW   Final Result   Suspect left pleural effusion. No airspace consolidation. Dual-chamber   pacemaker in place. Follow-up PA and lateral radiographs may be helpful in   further evaluation. XR CHEST PORTABLE    (Results Pending)         EKG and Telemetry:  12-lead EKG personally reviewed and shows NSR, possible old inferior MI pattern, QT prolongation    Telemetry personally reviewed and shows atrial fibrillation rate 120-130's        ASSESSMENT / PLAN:    1. Atrial fibrillation after coming off sotalol and significant stressors. Rates prestnly not unreasonable in light of her stressors. Try to wean pressors as able, if sustains heart rates over 130 could try some digoxin 0.25 IV prn up to 1 mg in a day. Off anticoagulation for the past month.  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     Rafael Salomon MD, [facility]  The [address] at [facility]    Electronically signed by Rafael Salomon MD on 10/8/2020 at 9:30 AM

## 2020-10-08 NOTE — PROGRESS NOTES
Pt admitted to room 205 from PACU, no belongings with patient. Called previous floor for belongings, none there per staff, family updated.

## 2020-10-08 NOTE — PROGRESS NOTES
Patient is inpatient at [facility] and scheduled for a exploratory laparoscopy on 10-09-20,  Electronically signed by Klarissa Reid MA on 10/8/2020 at 4:00 PM

## 2020-10-08 NOTE — OP NOTE
Operative Note      Patient: Nathalie Smith  YOB: 1935  MRN: [id number]    Date of Procedure: 10/7/2020    Pre-Op Diagnosis: Fulminant C. difficile colitis    Post-Op Diagnosis: Same, with colon perforation        Procedure(s):  TOTAL COLECTOMY WITH ABTHERA OPEN ABDOMINAL DRESSING    Surgeon(s):  Devante Wang MD    Assistant:   Resident: Janeen Ceballos MD; Melvina Bhardwaj MD    Anesthesia: General    Estimated Blood Loss (mL): 606     Complications: Other: septic shock    Specimens:   ID Type Source Tests Collected by Time Destination   1 : PERITONEAL FLUID Body Fluid Fluid CULTURE, ANAEROBIC, CULTURE, FUNGUS, GRAM STAIN, CULTURE, BODY FLUID, CULTURE WITH SMEAR, ACID FAST Georges Carey MD 10/7/2020 1700    A : TOTAL COLON Tissue Tissue SURGICAL PATHOLOGY Devante Wang MD 10/7/2020 1916        Implants:  * No implants in log *      Drains:   Negative Pressure Wound Therapy Abdomen Mid (Active)   Wound Type Surgical 10/08/20 0900   Dressing Type Black foam 10/08/20 0900   Dressing Status Clean;Dry; Intact 10/08/20 0900   Drainage Description Serosanguinous 10/08/20 0400   Output (ml) 875 ml 10/08/20 0628   Wound Assessment Clean;Dry; Intact 10/08/20 0400       NG/OG/NJ/NE Tube Nasogastric Left nostril (Active)   Surrounding Skin Dry; Intact 10/08/20 0900   Securement device Yes 10/08/20 0900   Status Suction-low intermittent 10/08/20 0900   Placement Verified by External Catheter Length 10/08/20 0900   NG/OG/NJ/NE External Measurement (cm) 74 cm 10/08/20 0900   Drainage Appearance Mary Kate Feil 10/08/20 0900       Urethral Catheter 16 fr (Active)   Catheter Indications Need for fluid management in critically ill patients in a critical care setting not able to be managed by other means such as BSC with hat, bedpan, urinal, condom catheter, or short term intermittent urethral catherization 10/08/20 0900   Site Assessment No urethral drainage 10/08/20 0900   Urine Color Glory 10/08/20 0835   Urine perforation was identified in the transverse colon with spillage of stool. A Bookwalter retractor was installed. The descending colon lateral attachments were divided with cautery. There also appear to be a contained perforation along the left colon that we identified during mobilization. The whole colon was friable and incredibly fragile, and during the mobilization multiple enterotomies occurred with just gentle manipulation and were unavoidable and there was a large amount of stool spillage. The terminal ileum was transected with a 75 mm AMRIT stapler with a blue load The right colon lateral attachments were also divided with cautery. The right colon was mobilized medially. The hepatic flexure was divided with cautery. The mesocolon was divided with the vessel sealer, including the ileocolic pedicle. The lesser sac was entered and transverse colon was divided from the omentum using cautery and the vessel sealer. The splenic flexure was mobilized with both blunt and cautery dissection. Next, the left colon was also mobilized medially, using cautery to dissect carefully from the retroperitoneum, and the  vessel sealer to divide the mesocolon. We did identify the ureter during our dissection and kept out of the way. The distal rectum was transected with a contour stapler with a blue load. There was an open area of the rectum at the staple line which was oversewn with two layers of 3-0 Vicryl. The small bowel was run from the ligament of Treitz to the transected end. There were some areas of secondary inflammation and fibrinous exudate but no injuries. The distal ileum attachments to the lateral abdominal wall were divided to mobilize it for a future ileostomy. The abdomen was copiously irrigated with liters of warm saline until the irrigant was clear.  Due to her ongoing pressor requirement as well as large amount of stool spillage, the decision was made to leave her in discontinuity with plan for a second look laparotomy in 48 hours to further assess and washout the abdomen. An Abthera vac was placed with good seal, >50 cm2. We did not have any significant areas of bleeding, however given her severe anemia at the beginning of the case as well as her pressor requirement we did transfuse 2 units Intra-Op. DISPOSITION: The patient was transferred to ICU in critical condition on levophed with a third unit of PRBC running. Electronically signed by Delano Larios MD on 10/8/2020 at 12:49 PM       Attestation:    I was present during the procedure and participated in all winters aspects.     Cherry Wiggins MD  10/14/20  5:06 PM

## 2020-10-08 NOTE — FLOWSHEET NOTE
CI HR MAP TPRI SVI TFC   Baseline 2.9 109   26 61.3   Test 2.7 111   25 62.1   Change -5 1.3   -5.2 1.3   noninvasive -  Change in SVI -5.2% pt not fluid responsive, will notify Dr. Liz Andrea

## 2020-10-08 NOTE — PATIENT CARE CONFERENCE
Intensive Care Daily Quality Rounding Checklist      ICU Team Members: bedside nurse, charge nurse, RT, clinical pharmacist, Dr.Johnson Amada(resident)    ICU Day #: NUMBER: 2    Intubation Date: October 7    Ventilator Day #: NUMBER: 2    Central Line Insertion Date: October 4        Day #: NUMBER: 5     Arterial Line Insertion Date: October 7      Day #: NUMBER: 2    DVT Prophylaxis: lovenox     GI Prophylaxis: protonix     Martin Catheter Insertion Date: October 4       Day #: 5      Continued need (if yes, reason documented and discussed with physician): yes, critically ill pt     Skin Issues/ Wounds and ordered treatment discussed on rounds: swollen, seeping     Goals/ Plans for the Day:wean vent and pressors as tolerated, restraint order for patient safety, labs,pain control

## 2020-10-08 NOTE — PROGRESS NOTES
Speech Language Pathology      NAME:  Michaela Bennett  :  1935  DATE: 10/8/2020  ROOM:  0205/0205-A         Decline in medical status and pt transferred to ICU. New orders will be needed for dysphagia therapy to continue. Recommend video swallow eval before oral diet initiated. Septic shock ([facility] [address] [A41.9, R65.21]  Septic shock ([facility] [address] [A41.9, R65.21]        Kristin JALLOH CCC/SLP [id number]  Speech-Language Pathologist

## 2020-10-08 NOTE — PROGRESS NOTES
10/08/2020    MCHC 32.7 10/08/2020    RDW 17.7 10/08/2020    PLT 51 10/08/2020    MPV 10.3 10/08/2020     CMP:    Lab Results   Component Value Date     10/08/2020    K 3.8 10/08/2020     10/08/2020    CO2 18 10/08/2020    BUN 32 10/08/2020    CREATININE 1.0 10/08/2020    GFRAA >60 10/08/2020    LABGLOM 53 10/08/2020    GLUCOSE 200 10/08/2020    PROT 3.8 10/08/2020    LABALBU 2.3 10/08/2020    CALCIUM 7.1 10/08/2020    BILITOT 2.2 10/08/2020    ALKPHOS 105 10/08/2020    AST 23 10/08/2020    ALT 11 10/08/2020     Magnesium:    Lab Results   Component Value Date    MG 1.9 10/08/2020     Phosphorus:    Lab Results   Component Value Date    PHOS 2.6 10/08/2020     Uric Acid:  No results found for: Shreyas Davis  U/A:    Lab Results   Component Value Date    COLORU CASEY 10/04/2020    PROTEINU TRACE 10/04/2020    PHUR 5.0 10/04/2020    WBCUA >20 10/04/2020    RBCUA NONE 10/04/2020    BACTERIA FEW 10/04/2020    CLARITYU TURBID 10/04/2020    SPECGRAV 1.025 10/04/2020    LEUKOCYTESUR MODERATE 10/04/2020    UROBILINOGEN 0.2 10/04/2020    BILIRUBINUR MODERATE 10/04/2020    BLOODU Negative 10/04/2020    GLUCOSEU Negative 10/04/2020     Radiology Review:    Atelectasis and pleural effusion in the lung bases more on the left side.         Right IJ central line has noted without complications.           Problems resolved:       IMPRESSION/RECOMMENDATIONS:      oYshi Schreiber is a 82 y.o. female with significant past medical history of Colitis, diarrhea, blood in stool, A-Fib, anemia, and HTN admitted via ED for hypotension and diarrhea. Pt with SBP in 80's in ED, she was treated with IV fluids. Pt has been having diarrhea for > 3 months stating stools are dark with red blood associated nausea, vomiting, and poor oral intake. Daughter reports that patient was discharged from [facility] and was living with her. Patient got progressively worse therefore she brought her to ED.  Initial labs done showed serum sodium 126 and creatinine of 1.3 mg/dl yesterday which worsened to 122 and 1.6 mg/dl respectively therefore this consultation was ordered. 1. QI secondary to volume responsive prerenal state versus ischemic ATN    2. Hyponatremia probably secondary to GI losses  3. C. Difficile colitis, with evidence of fulminant colitis with multiple perforations, s/p total colectomy  4. High anion gap metabolic acidosis secondary to lactic acidosis with severe hypotension, hypoperfusion, PH>7.2  5. Anemia : on Ferrous sulfate,   6. Severe hypoalbuminemia/malnutrition  7. Hypocalcemia: Corrected calcium for hypoalbuminemia is 7.7mg/dL  8. Nutrition:NPO      PLAN:    · QI, oliguric secondary to severe ischemic ATN from profound hypotension, C/W IVF, pressor support. Continue to monitor the renal function and UO closely.   · No electrolyte, volume or acid base problems needing emergent HD at this time  · Repeat BMP and ABG at 4, if the [facility] >7.2 recommend stopping the bicarbonate drip and switching to NS at 125 ml/hr  · Antibiotics dosed to her GFR per ID recommendation  · Obtain ionized calcium at 4 pm    Discussed with her daughter present at bedside      Finn Sol MD

## 2020-10-08 NOTE — PROGRESS NOTES
Priti Cornejo Hospitalist   Progress Note    Admitting Date and Time: 10/4/2020  4:26 PM  Admit Dx: Septic shock ([address] [A41.9, R65.21]  Septic shock ([address] [A41.9, R65.21]    Subjective:    Patient was admitted with Septic shock ([address] [A41.9, R65.21]  Septic shock ([address] [A41.9, R65.21]. Patient sedated and intubated.  sodium chloride flush  10 mL Intravenous BID    piperacillin-tazobactam  3.375 g Intravenous Q8H    chlorhexidine  15 mL Mouth/Throat BID    metroNIDAZOLE  500 mg Intravenous Q8H    pantoprazole  40 mg Intravenous Daily    [Held by provider] enoxaparin  30 mg Subcutaneous Daily     digoxin, 250 mcg, Q2H PRN  artificial tears, , PRN         Objective:          PHYSICAL EXAM:    Vitals:  BP (!) 105/46   Pulse 106   Temp 97.7 °F (36.5 °C)   Resp 16   Ht 5' 6\" (1.676 m)   Wt 208 lb 6.4 oz (94.5 kg)   SpO2 97%   BMI 33.64 kg/m²     General:  Appears comfortable. Pt sedated and intubated  HEENT:  Mucous membranes moist. No erythema, rhinorrhea, or post-nasal drip noted. Neck:  No carotid bruits. Heart:  Rhythm regular at rate of 104  Lungs:  CTA. No wheeze, rales, or rhonchi  Abdomen:  Positive bowel sounds positive. Soft. Non-tender. No guarding, rebound or rigidity. Breast/Rectal/Genitourinary: not pertinent. Extremities:  positive for 1+ b/l lower extremity edema  Skin:  Warm and dry  Vascular: 2/4 Dorsalis Pedis pulses bilaterally.   Neuro:  Limited due to pt's status                Recent Labs     10/07/20  2210 10/08/20  0622 10/08/20  1600    139 136   K 4.0 3.8 3.3*   * 109* 108*   CO2 17* 18* 18*   BUN 34* 32* 29*   CREATININE 0.9 1.0 0.9   GLUCOSE 124* 200* 258*   CALCIUM 7.3* 7.1* 6.2*       Recent Labs     10/07/20  1230 10/07/20  2210 10/08/20  0622 10/08/20  1805   WBC 21.4* 33.2* 52.4*  --    RBC 2.62* 4.01 3.83  --    HGB 7.6* 11.8 11.3* 9.3*   HCT 23.3* 36.8 34.6 27.7*   MCV 88.9 91.8 90.3  --    MCH 29.0 29.4 29.5  --    MCHC 32.6 32.1 32.7 --    RDW 19.5* 17.7* 17.7*  --    * 73* 51*  --    MPV 9.4 9.3 10.3  --        CBC with Differential:    Lab Results   Component Value Date    WBC 52.4 10/08/2020    RBC 3.83 10/08/2020    HGB 9.3 10/08/2020    HCT 27.7 10/08/2020    PLT 51 10/08/2020    MCV 90.3 10/08/2020    MCH 29.5 10/08/2020    MCHC 32.7 10/08/2020    RDW 17.7 10/08/2020    METASPCT 13.0 10/08/2020    LYMPHOPCT 3.0 10/08/2020    PROMYELOPCT 3.0 10/08/2020    MONOPCT 2.0 10/08/2020    MYELOPCT 3.0 10/08/2020    BASOPCT 0.0 10/08/2020    MONOSABS 1.05 10/08/2020    LYMPHSABS 1.57 10/08/2020    EOSABS 0.00 10/08/2020    BASOSABS 0.00 10/08/2020     CMP:    Lab Results   Component Value Date     10/08/2020    K 3.3 10/08/2020    K 3.8 10/08/2020     10/08/2020    CO2 18 10/08/2020    BUN 29 10/08/2020    CREATININE 0.9 10/08/2020    GFRAA >60 10/08/2020    LABGLOM 60 10/08/2020    GLUCOSE 258 10/08/2020    PROT 3.8 10/08/2020    LABALBU 2.3 10/08/2020    CALCIUM 6.2 10/08/2020    BILITOT 2.2 10/08/2020    ALKPHOS 105 10/08/2020    AST 23 10/08/2020    ALT 11 10/08/2020     Magnesium:    Lab Results   Component Value Date    MG 1.9 10/08/2020     Phosphorus:    Lab Results   Component Value Date    PHOS 2.6 10/08/2020     ABG:    Lab Results   Component Value Date    PH 7.322 10/08/2020    PCO2 35.1 10/08/2020    PO2 132.4 10/08/2020    HCO3 17.8 10/08/2020    BE -7.5 10/08/2020    O2SAT 98.5 10/08/2020        Radiology:   RADIOLOGY REPORT   Final Result      XR CHEST PORTABLE   Final Result   1. No interval change in the small left pleural effusion and left lung base   atelectasis. 2. No change in the minimal right lung base atelectasis. XR CHEST PORTABLE   Final Result   Opacities are present in left lung base which appear to have increased in   could suggest increasing pneumonia, atelectasis, or effusion. XR CHEST ABDOMEN NG PLACEMENT   Final Result   Satisfactory position of nasogastric tube.          XR ABDOMEN (KUB) (SINGLE AP VIEW)   Final Result   Transverse, descending, and sigmoid colon mural thickening may reflect edema   and/or inflammation/infection, without significant interval change. Slight prominence of gas throughout the small intestine, without gross   distension, suggestive of paralytic ileus. Persisting left lung base opacity may be atelectasis with pleural effusion. Differential again includes infectious infiltrate         XR ABDOMEN (KUB) (SINGLE AP VIEW)   Final Result   Thickening of the transverse colon wall. Consistent with inflammation   identified on prior CT. US GALLBLADDER RUQ   Final Result   Cholelithiasis with positive sonographic Hopson's sign but no wall thickening   or pericholecystic inflammatory changes, altogether equivocal for   cholecystitis. Consider functional analysis with nuclear medicine HIDA scan. Hepatic steatosis. 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 Suspected small right pleural effusion. CT ABDOMEN PELVIS W IV CONTRAST Additional Contrast? None   Final Result   Addendum 1 of 1   ADDENDUM:   In the title of the examination, disregard the CT abdomen and pelvis. Final      CT ABDOMEN PELVIS W IV CONTRAST Additional Contrast? None   Final Result   Diffuse inflammation of the transverse colon, descending and rectosigmoid   colon with wall thickening and edema with active contrast   extravasation/bleeding. Hemorrhagic diverticulitis or colitis are   considered. There is some edema in the presacral area and tiny amount of air   collection in the rectovesical pouch. Walled-off microperforation is   considered. Distended gallbladder. Atelectasis/pleural effusion in the left lung base. RECOMMENDATIONS:   The clinical service was notified         XR CHEST PORTABLE   Final Result   Addendum 1 of 1   ADDENDUM:   In the title of the examination, disregard the CT abdomen and pelvis. Final      XR CHEST 1 VIEW   Final Result   Suspect left pleural effusion. No airspace consolidation. Dual-chamber   pacemaker in place. Follow-up PA and lateral radiographs may be helpful in   further evaluation. XR CHEST PORTABLE    (Results Pending)       Assessment:    Active Problems:    Septic shock (HCC)    Hyponatremia    Diarrhea    UTI (urinary tract infection) with pyuria    Anemia    Leukocytosis    Acidosis    Hypotension  Resolved Problems:    * No resolved hospital problems. *      Plan:  1. Hypotension (septic shock vs hypovolemia)  intensivist following. Pressors per intensivist.   2. sepsis(leukocytosis, RR noted to go up to 21 and 33, infection)POA supportive care and tx underlying infection  3. uti possibly due to catheter treatment started as outpt and continued here for now. ID following. 4. C. Diff colitis, fuminant  abx per ID   Surgery following. Gi following. Pt st/p total colectomy. 5. UC less likely   bx reviewed and appears to be idiopathic colitis on bx. D/w gi. Pseudomembranous colitis likely and recommended surgical consult with notation of microperforation. Gi following peripherally  6. Hyponatremia improved monitor  7. mary renal following  Fluids per renal. improving  8.  leukocytosis possibly multifactorial monitor closely  9. Atelectasis contributing to leukocytosis  monitor  10. Anemia monitor and transfuse prn  11. Acidosis/lactic acidosis monitor  12. Hypoalbuminemia edema likely due to third spacing  13. Pleural effusion monitor low albumin may also be contributing  14. Atrial fib monitor and tx. Cardiology on consult  15. htn stop lisinopril  Monitor bp  16. Hypokalemia monitor and replace prn  17.  Thrombocytopenia monitor    Chart reviewed and updated by nursing    Time spent is 35 min      Electronically signed by Rush Shook DO on 10/8/2020 at 7:55 PM

## 2020-10-08 NOTE — PROGRESS NOTES
[address] [place] [address] [facility]  TG  Progress Note    SUBJECTIVE:  Chief Complaint   Patient presents with    Hypotension    Diarrhea     Events noted. The patient was taken to the OR emergently yesterday. She underwent a total colectomy for fulminant colitis and multiple perforations throughout the colon. She is now in the ICU on 3 vasopressors. She is intubated. Review of systems:  As stated above in the chief complaint, otherwise negative. Medications:  Scheduled Meds:   sodium chloride flush  10 mL Intravenous BID    chlorhexidine  15 mL Mouth/Throat BID    metroNIDAZOLE  500 mg Intravenous Q8H    pantoprazole  40 mg Intravenous Daily    [Held by provider] enoxaparin  30 mg Subcutaneous Daily     Continuous Infusions:   vasopressin (Septic Shock) infusion 0.04 Units/min (10/08/20 0442)    norepinephrine Stopped (10/08/20 0615)    fentaNYL 5 mcg/ml in 0.9%  ml infusion 50 mcg/hr (10/08/20 07)    propofol      phenylephrine (HUGO-SYNEPHRINE) 50mg/250mL infusion 250 mcg/min (10/08/20 0934)    sodium bicarbonate infusion 150 mL/hr at 10/08/20 0635     PRN Meds:digoxin, [DISCONTINUED] lubrifresh P.M. **AND** artificial tears    OBJECTIVE:  BP (!) 105/46   Pulse 140   Temp 97.9 °F (36.6 °C) (Axillary)   Resp 16   Ht 5' 6\" (1.676 m)   Wt 208 lb 6.4 oz (94.5 kg)   SpO2 97%   BMI 33.64 kg/m²   Temp  Av.8 °F (36 °C)  Min: 95.9 °F (35.5 °C)  Max: 98.1 °F (36.7 °C)  Constitutional: The patient is lying in bed in the ICU. She is intubated and sedated. Daughter in room. Skin: Warm and dry. No rashes were noted. HEENT: Round and reactive pupils. Moist mucous membranes. No ulcerations or thrush. ET tube. OG tube. Neck: Supple to movements. Chest: No respiratory distress. Good breath sounds. No crackles. Cardiovascular: Heart sounds rhythmic and regular. Abdomen: Abdomen shows a larger wound VAC. Absent bowel sounds. Soft to palpation.   Extremities: No edema. Lines: Right IJ TLC 10/4/2020. Right PICC 10/7/2020. Left radial arterial line 10/7/2020. Martin catheter with clear urine. Laboratory and Tests Review:  Lab Results   Component Value Date    WBC 52.4 (H) 10/08/2020    WBC 33.2 (H) 10/07/2020    WBC 21.4 (H) 10/07/2020    HGB 11.3 (L) 10/08/2020    HCT 34.6 10/08/2020    MCV 90.3 10/08/2020    PLT 51 (L) 10/08/2020     Lab Results   Component Value Date    NEUTROABS 19.26 (H) 10/07/2020    NEUTROABS 19.08 (H) 10/07/2020    NEUTROABS 27.78 (H) 10/06/2020     No results found for: [facility]  Lab Results   Component Value Date    ALT 11 10/08/2020    AST 23 10/08/2020    ALKPHOS 105 (H) 10/08/2020    BILITOT 2.2 (H) 10/08/2020     Lab Results   Component Value Date     10/08/2020    K 3.8 10/08/2020     10/08/2020    CO2 18 10/08/2020    BUN 32 10/08/2020    CREATININE 1.0 10/08/2020    CREATININE 0.9 10/07/2020    CREATININE 0.9 10/07/2020    GFRAA >60 10/08/2020    LABGLOM 53 10/08/2020    GLUCOSE 200 10/08/2020    PROT 3.8 10/08/2020    LABALBU 2.3 10/08/2020    CALCIUM 7.1 10/08/2020    BILITOT 2.2 10/08/2020    ALKPHOS 105 10/08/2020    AST 23 10/08/2020    ALT 11 10/08/2020     Lab Results   Component Value Date    CRP 14.3 (H) 10/06/2020    CRP 17.5 (H) 10/05/2020     Lab Results   Component Value Date    SEDRATE 6 10/06/2020    SEDRATE 10 10/05/2020     Radiology:  Ultrasound reviewed. Positive Hopson sign but no gallbladder thickening    Microbiology:   Nares screen for MRSA: Negative  Urine culture 10/4/2020 <10,000 E. coli, Corynebacterium, yeast  Blood cultures 10/4/2020: Negative so far  Stools for C. difficile: Positive  Peritoneal fluid culture 10/7/2020: Pending    No results for input(s): PROCAL in the last 72 hours. ASSESSMENT:  · Severe C. difficile infection with colitis  · Fulminant colitis.   Status post total colectomy with abdominal washout and placement of wound VAC 10/7/2020  · Peritonitis associated to the above  · Leukemoid reaction associated to fulminant colitis  · Possible UTI, being treated with Ceftriaxone at the nursing facility.   Urine culture is negative here  · Acute kidney injury, improving    PLAN:  · Continue IV Metronidazole for now  · Start oral Vancomycin when possible  · Start Zosyn  · Since the colon has been removed, isolation can be discontinued    Discussed with nursing    Margie Jaffe  10:03 AM  10/8/2020

## 2020-10-08 NOTE — CONSULTS
Critical Care Admit/Consult Note         Patient - Francisca Brown   MRN -  [id number]   Acct # - [de-identified]   - 1935      Date of Admission -  10/4/2020  4:26 PM  Date of evaluation -  10/8/2020  0205/0205-A   Hospital Day - 4            ADMIT/CONSULT DETAILS     Reason for Admit/Consult   Post operative Mechanical Ventilation  Colectomy secondary to toxic megacolon  Sepsis     Consulting Service/Physician   Consulting - Amanda Fernandez DO  Primary Care Physician - Calvin Peterson MD         HPI   The patient is S 55 y. o. female with significant past medical history of A-fib, HTN, and anemia that presented to the ER due to hypotension and diarrhea. Patient was recently diagnosed with UTI and was placed on antibiotics. Patient has been having diarrhea for a few weeks. Patient has been having diffuse cramping abdominal pain with no radiation. Patient was found to be guaiac positive in the ER. Patient was transferred out of the ICU on 10/06. On 10/07, patient had an increase in abdominal pain and peritonitis, so she was brought to the OR. Patient underwent a total colectomy with abthera open abdominal dressing. Patient was re-admitted to the ICU due to need for mechanical ventilation.           Past Medical History         Diagnosis Date    Anemia     Atrial fibrillation ([address]     Colitis     Hypertension         Past Surgical History           Procedure Laterality Date    SMALL INTESTINE SURGERY N/A 10/7/2020    TOTAL COLECTOMY WITH ABTHERA OPEN ABDOMINAL DRESSING performed by Mary Jo Templeton MD at [facility] OR       Current Medications   Current Medications    sodium chloride flush  10 mL Intravenous BID    chlorhexidine  15 mL Mouth/Throat BID    metroNIDAZOLE  500 mg Intravenous Q8H    pantoprazole  40 mg Intravenous Daily    [Held by provider] enoxaparin  30 mg Subcutaneous Daily     [DISCONTINUED] lubrifresh P.M. **AND** artificial tears  IV Drips/Infusions   vasopressin (Septic Shock) infusion 0.04 Units/min (10/08/20 0442)    norepinephrine Stopped (10/08/20 0615)    fentaNYL 5 mcg/ml in 0.9%  ml infusion 50 mcg/hr (10/08/20 0727)    propofol      phenylephrine (HUGO-SYNEPHRINE) 50mg/250mL infusion 300 mcg/min (10/08/20 0626)    sodium bicarbonate infusion 150 mL/hr at 10/08/20 0635     Home Medications  Medications Prior to Admission: acetaminophen (TYLENOL) 325 MG tablet, Take 650 mg by mouth every 6 hours as needed for Pain  amitriptyline (ELAVIL) 10 MG tablet, Take 10 mg by mouth nightly  dicyclomine (BENTYL) 10 MG capsule, Take 10 mg by mouth every 6 hours as needed  calcium carbonate 600 MG TABS tablet, Take 1 tablet by mouth daily  ferrous sulfate (IRON 325) 325 (65 Fe) MG tablet, Take 325 mg by mouth daily (with breakfast)  lisinopril (PRINIVIL;ZESTRIL) 40 MG tablet, Take 40 mg by mouth daily  melatonin 3 MG TABS tablet, Take 6 mg by mouth daily  tamsulosin (FLOMAX) 0.4 MG capsule, Take 0.4 mg by mouth nightly  loperamide (IMODIUM) 2 MG capsule, Take 2 mg by mouth every 12 hours as needed for Diarrhea  mesalamine (PENTASA) 500 MG extended release capsule, Take 500 mg by mouth 4 times daily  cefTRIAXone (ROCEPHIN) 1 g injection, Infuse 1 g intravenously every 24 hours  sotalol (BETAPACE) 120 MG tablet, Take 120 mg by mouth 2 times daily  ondansetron (ZOFRAN) 4 MG tablet, Take 4 mg by mouth every 6 hours as needed for Nausea or Vomiting    Diet/Nutrition   Diet NPO Effective Now Exceptions are: Sips with Meds    Allergies   Codeine    Social History   Tobacco   reports that she has never smoked. She has never used smokeless tobacco.    Alcohol     reports no history of alcohol use. Occupational history :    Family History   History reviewed. No pertinent family history.     Sleep History   none    ROS     REVIEW OF SYSTEMS:  Review of Systems   Unable to perform ROS: Intubated       Lines and Devices   CVC RIJ 10/04/2020  Right Midline 10/04/2020  Left radial arterial 10/07/2020    Mechanical Ventilation Data   VENT SETTINGS (Comprehensive)  Vent Information  $Ventilation: $Subsequent Day  Equipment Changed: (S) HME  Vent Type: 840  Vent Mode: AC/VC  Vt Ordered: 400 mL  Rate Set: 16 bmp  Peak Flow: 50 L/min  Pressure Support: 0 cmH20  FiO2 : 40 %  SpO2: 96 %  SpO2/FiO2 ratio: 240  Sensitivity: 3  PEEP/CPAP: 5  I Time/ I Time %: 0 s  Humidification Source: HME  Additional Respiratory  Assessments  Pulse: 117  Resp: 17  SpO2: 96 %  Position: Semi-Forbes's  Humidification Source: HME  Oral Care: Mouth swabbed, Mouth moisturizer, Mouth suctioned    ABG  Lab Results   Component Value Date    O2SAT 99.2 10/08/2020     Lab Results   Component Value Date    MODE AC 10/08/2020           Vitals    height is 5' 6\" (1.676 m) and weight is 208 lb 6.4 oz (94.5 kg). Her axillary temperature is 98.1 °F (36.7 °C). Her blood pressure is 105/46 (abnormal) and her pulse is 117. Her respiration is 17 and oxygen saturation is 96%.        Temperature Range: Temp: 98.1 °F (36.7 °C) Temp  Av.8 °F (36 °C)  Min: 95.9 °F (35.5 °C)  Max: 98.6 °F (37 °C)  BP Range:  Systolic (53JYE), ZHY:416 , Min:94 , DRL:963     Diastolic (08GCR), UQQ:90, Min:45, Max:99    Pulse Range: Pulse  Av  Min: 89  Max: 151  Respiration Range: Resp  Avg: 10.1  Min: 6  Max: 25  Current Pulse Ox[de-identified]  SpO2: 96 %  24HR Pulse Ox Range:  SpO2  Av.3 %  Min: 80 %  Max: 100 %  Oxygen Amount and Delivery: O2 Flow Rate (L/min): 2 L/min      I/O (24 Hours)    Patient Vitals for the past 8 hrs:   BP Temp Temp src Pulse Resp SpO2   10/08/20 0805 -- -- -- 117 17 96 %   10/08/20 0600 -- -- -- 129 20 96 %   10/08/20 0500 -- -- -- 148 21 95 %   10/08/20 0431 -- -- -- 134 20 96 %   10/08/20 0400 (!) 105/46 98.1 °F (36.7 °C) Axillary 140 17 96 %   10/08/20 0300 -- -- -- 129 22 97 %   10/08/20 0200 -- -- -- 129 25 94 %   10/08/20 0100 -- -- -- 124 19 95 %       Intake/Output Summary (Last 24 hours) at 10/8/2020 9913  Last data filed at 10/8/2020 7763  Gross per 24 hour   Intake 5905 ml   Output 1440 ml   Net 4465 ml     I/O last 3 completed shifts: In: 1856 [I.V.:4405; Blood:1050; IV Piggyback:450]  Out: 1440 [Urine:365; Drains:875; Blood:200]   Date 10/08/20 0000 - 10/08/20 2359   Shift 1479-8483 3140-0728 5952-0101 24 Hour Total   INTAKE   I.V.(mL/kg) 2905(30.7)   2905(30.7)   IV Piggyback(mL/kg) 450(4.8)   450(4.8)   Shift Total(mL/kg) 3355(35.5)   3355(35.5)   OUTPUT   Urine(mL/kg/hr) 115(0.2)   115   Drains(mL/kg) 875(9.3)   875(9.3)   Shift Total(mL/kg) 990(10.5)   990(10.5)   Weight (kg) 94.5 94.5 94.5 94.5     Patient Vitals for the past 96 hrs (Last 3 readings):   Weight   10/07/20 0321 208 lb 6.4 oz (94.5 kg)   10/06/20 0600 205 lb 14.6 oz (93.4 kg)   10/05/20 0000 195 lb 12.3 oz (88.8 kg)         Drains/Tubes Outputs  Martin  Exam         PHYSICAL EXAM:  Physical Exam  Vitals signs reviewed. Constitutional:       Appearance: She is ill-appearing. Comments: Intubated, sedated, following commands; GCS 8, RASS -2   HENT:      Head: Normocephalic. Mouth/Throat:      Mouth: Mucous membranes are dry. Eyes:      Pupils: Pupils are equal, round, and reactive to light. Cardiovascular:      Rate and Rhythm: Tachycardia present. Rhythm irregularly irregular. Heart sounds: Normal heart sounds. No murmur. Pulmonary:      Breath sounds: Rhonchi present. Abdominal:      Palpations: Abdomen is soft. Comments: Open, packed, dressing bloody   Musculoskeletal:      Right lower le+ Edema present. Left lower le+ Edema present. Skin:     Capillary Refill: Capillary refill takes 2 to 3 seconds. Coloration: Skin is pale.    Neurological:      Comments: Sedated, intubated         Data   Old records and images have been reviewed    Lab Results   CBC     Lab Results   Component Value Date    WBC 52.4 10/08/2020    RBC 3.83 10/08/2020    HGB 11.3 10/08/2020    HCT 34.6 10/08/2020    PLT 51 10/08/2020    MCV 90.3 10/08/2020    MCH 29.5 10/08/2020    MCHC 32.7 10/08/2020    RDW 17.7 10/08/2020    METASPCT 3.0 10/07/2020    LYMPHOPCT 7.0 10/07/2020    PROMYELOPCT 1.0 10/06/2020    MONOPCT 1.0 10/07/2020    MYELOPCT 2.0 10/07/2020    BASOPCT 1.0 10/07/2020    MONOSABS 0.21 10/07/2020    LYMPHSABS 1.50 10/07/2020    EOSABS 0.21 10/07/2020    BASOSABS 0.21 10/07/2020       BMP   Lab Results   Component Value Date     10/08/2020    K 3.8 10/08/2020     10/08/2020    CO2 18 10/08/2020    BUN 32 10/08/2020    CREATININE 1.0 10/08/2020    GLUCOSE 200 10/08/2020    CALCIUM 7.1 10/08/2020       LFTS  Lab Results   Component Value Date    ALKPHOS 105 10/08/2020    ALT 11 10/08/2020    AST 23 10/08/2020    PROT 3.8 10/08/2020    BILITOT 2.2 10/08/2020    BILIDIR 0.4 10/05/2020    IBILI 0.2 10/05/2020    LABALBU 2.3 10/08/2020       INR  Recent Labs     10/07/20  1340   PROTIME 18.3*   INR 1.6       APTT  Recent Labs     10/07/20  1340   APTT 58.6*       Lactic Acid  Lab Results   Component Value Date    LACTA 6.9 10/08/2020    LACTA 2.6 10/07/2020    LACTA 1.4 10/07/2020        BNP   No results for input(s): BNP in the last 72 hours. Cultures     No results for input(s): BC in the last 72 hours. No results for input(s): Caralee Roller in the last 72 hours. No results for input(s): LABURIN in the last 72 hours. Radiology   CXR  1. No interval change in the small left pleural effusion and left lung base    atelectasis. 2. No change in the minimal right lung base atelectasis.        SYSTEMS ASSESSMENT  Neuro   Intubated and Sedated    -On Fentanyl   -RASS -2       Respiratory   Mechanical ventilation   -Intubated in OR 10/07-day 2   -Wean as tolerated    -cxr   -ABG 6.42/88.2/975/86.8    Metabolic Acidosis   Likely due to sepsis   -LA 6.9   -Bicarb drip started this AM       Cardiovascular         A-fib with RVR    -Given Digoxin overnight   -Consult Cardiology     Septic Shock  Likely due to fulminant colitis             -On Levo, vaso, and Silvestre     DVT prophylaxis held due to low platelets    Hx of HTN and A-fib rhythm controlled               -Hold Lisinopril 40 mg QD due to hypotension              -Sotalol discontinued due to being NPO              -Monitor Qtc      Gastrointestinal     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    Fulminant Colitis  S/p total colectomy on 10/07             -General surgery following      Protonix PPx     Renal   QI   Resolved     -Cre 1.0 today              Metabolic acidosis  Likely due to diarrhea and sepsis   -On bicarb drip this AM        Infectious Disease   Septic Shock   Likely due to fulminant colitis   Improving              -WBC worsened; WBC 52.4              -On Silvestre, Vaso, and Levophed     Fulminant Colitis   Hx of Ulcerative colitis  S/p total colectomy on 10/07              -C-diff positive       -On Flagyl IV day 3          Hematology/Oncology   Anemia  Likely multi-factorial- GI bleed, anemia of chronic disease              -Hgb 11.3              -Transfuse < 7               -CBC daily      Endocrine   No acute issues  Keep blood glucose < 180     Social/Spiritual/DNR/Other   Limited code  NPO     Lines:  CVC RIJ 10/04/2020- day 5  Martin 10/04/2020- day 5  Midline Right- day 4  Left radial Art- day 2    Irma Hernandez MD    I personally saw, examined and provided care for the patient. Radiographs, labs and medication list were reviewed by me independently. I spoke with bedside nursing, therapists and consultants. Critical care services and times documented are independent of procedures and multidisciplinary rounds with Residents. Additionally comprehensive, multidisciplinary rounds were conducted with the MICU team. The case was discussed in detail and plans for care were established. Review of Residents documentation was conducted and revisions were made as appropriate. I agree with the above documented exam, problem list and plan of care.       Check nicom   Wean pressors as tolerated   Abx   Full vent support   Critically ill     Casandra Jean M.D.    Pulmonary/Critical Care Medicine   45 min cct excluding procedures

## 2020-10-09 ENCOUNTER — APPOINTMENT (OUTPATIENT)
Dept: GENERAL RADIOLOGY | Age: 85
DRG: 853 | End: 2020-10-09
Payer: MEDICARE

## 2020-10-09 ENCOUNTER — APPOINTMENT (OUTPATIENT)
Dept: ULTRASOUND IMAGING | Age: 85
DRG: 853 | End: 2020-10-09
Payer: MEDICARE

## 2020-10-09 ENCOUNTER — ANESTHESIA EVENT (OUTPATIENT)
Dept: OPERATING ROOM | Age: 85
DRG: 853 | End: 2020-10-09
Payer: MEDICARE

## 2020-10-09 ENCOUNTER — ANESTHESIA (OUTPATIENT)
Dept: OPERATING ROOM | Age: 85
End: 2020-10-09

## 2020-10-09 PROBLEM — Z95.0 CARDIAC PACEMAKER IN SITU: Status: ACTIVE | Noted: 2020-10-09

## 2020-10-09 PROBLEM — I49.5 SICK SINUS SYNDROME (HCC): Status: ACTIVE | Noted: 2020-10-09

## 2020-10-09 LAB
AADO2: 138.1 MMHG
ALBUMIN SERPL-MCNC: 1.7 G/DL (ref 3.5–5.2)
ALP BLD-CCNC: 116 U/L (ref 35–104)
ALT SERPL-CCNC: 11 U/L (ref 0–32)
ANION GAP SERPL CALCULATED.3IONS-SCNC: 9 MMOL/L (ref 7–16)
ANISOCYTOSIS: ABNORMAL
APPEARANCE FLUID: NORMAL
APTT: 76.7 SEC (ref 24.5–35.1)
AST SERPL-CCNC: 26 U/L (ref 0–31)
B.E.: -5.6 MMOL/L (ref -3–3)
BASOPHILIC STIPPLING: ABNORMAL
BASOPHILS ABSOLUTE: 0 E9/L (ref 0–0.2)
BASOPHILS RELATIVE PERCENT: 0 % (ref 0–2)
BILIRUB SERPL-MCNC: 1.7 MG/DL (ref 0–1.2)
BLOOD BANK DISPENSE STATUS: NORMAL
BLOOD BANK PRODUCT CODE: NORMAL
BLOOD CULTURE, ROUTINE: NORMAL
BPU ID: NORMAL
BUN BLDV-MCNC: 29 MG/DL (ref 8–23)
BURR CELLS: ABNORMAL
BURR CELLS: ABNORMAL
CALCIUM IONIZED: 1.03 MMOL/L (ref 1.15–1.33)
CALCIUM SERPL-MCNC: 6.4 MG/DL (ref 8.6–10.2)
CHLORIDE BLD-SCNC: 110 MMOL/L (ref 98–107)
CO2: 21 MMOL/L (ref 22–29)
COHB: 0.4 % (ref 0–1.5)
CREAT SERPL-MCNC: 0.9 MG/DL (ref 0.5–1)
CREATININE FLUID: 1.1 MG/DL
CRITICAL: ABNORMAL
CULTURE, BLOOD 2: NORMAL
DATE ANALYZED: ABNORMAL
DATE OF COLLECTION: ABNORMAL
DESCRIPTION BLOOD BANK: NORMAL
DOHLE BODIES: ABNORMAL
EOSINOPHILS ABSOLUTE: 0 E9/L (ref 0.05–0.5)
EOSINOPHILS RELATIVE PERCENT: 0 % (ref 0–6)
FIBRINOGEN: 309 MG/DL (ref 225–540)
FIO2: 40 %
FLUID TYPE: NORMAL
GFR AFRICAN AMERICAN: >60
GFR NON-AFRICAN AMERICAN: 60 ML/MIN/1.73
GLUCOSE BLD-MCNC: 159 MG/DL (ref 74–99)
HCO3: 20.4 MMOL/L (ref 22–26)
HCT VFR BLD CALC: 23.9 % (ref 34–48)
HCT VFR BLD CALC: 24.5 % (ref 34–48)
HCT VFR BLD CALC: 30.4 % (ref 34–48)
HEMOGLOBIN: 10.1 G/DL (ref 11.5–15.5)
HEMOGLOBIN: 7.9 G/DL (ref 11.5–15.5)
HEMOGLOBIN: 8.1 G/DL (ref 11.5–15.5)
HHB: 3.1 % (ref 0–5)
HYPOCHROMIA: ABNORMAL
INR BLD: 1.6
INR BLD: 2.4
LAB: ABNORMAL
LACTIC ACID: 3.4 MMOL/L (ref 0.5–2.2)
LACTIC ACID: 4.1 MMOL/L (ref 0.5–2.2)
LACTIC ACID: 4.1 MMOL/L (ref 0.5–2.2)
LACTIC ACID: 4.9 MMOL/L (ref 0.5–2.2)
LYMPHOCYTES ABSOLUTE: 1.06 E9/L (ref 1.5–4)
LYMPHOCYTES ABSOLUTE: 2.7 E9/L (ref 1.5–4)
LYMPHOCYTES ABSOLUTE: 2.96 E9/L (ref 1.5–4)
LYMPHOCYTES RELATIVE PERCENT: 3 % (ref 20–42)
LYMPHOCYTES RELATIVE PERCENT: 5 % (ref 20–42)
LYMPHOCYTES RELATIVE PERCENT: 7 % (ref 20–42)
Lab: ABNORMAL
MAGNESIUM: 1.7 MG/DL (ref 1.6–2.6)
MCH RBC QN AUTO: 30 PG (ref 26–35)
MCH RBC QN AUTO: 30 PG (ref 26–35)
MCH RBC QN AUTO: 30.1 PG (ref 26–35)
MCHC RBC AUTO-ENTMCNC: 33.1 % (ref 32–34.5)
MCHC RBC AUTO-ENTMCNC: 33.1 % (ref 32–34.5)
MCHC RBC AUTO-ENTMCNC: 33.2 % (ref 32–34.5)
MCV RBC AUTO: 90.5 FL (ref 80–99.9)
MCV RBC AUTO: 90.7 FL (ref 80–99.9)
MCV RBC AUTO: 90.9 FL (ref 80–99.9)
METAMYELOCYTES RELATIVE PERCENT: 1 % (ref 0–1)
METAMYELOCYTES RELATIVE PERCENT: 11 % (ref 0–1)
METAMYELOCYTES RELATIVE PERCENT: 9 % (ref 0–1)
METER GLUCOSE: 158 MG/DL (ref 74–99)
METHB: 0.1 % (ref 0–1.5)
MODE: AC
MONOCYTES ABSOLUTE: 1.08 E9/L (ref 0.1–0.95)
MONOCYTES ABSOLUTE: 2.54 E9/L (ref 0.1–0.95)
MONOCYTES ABSOLUTE: 3.18 E9/L (ref 0.1–0.95)
MONOCYTES RELATIVE PERCENT: 2 % (ref 2–12)
MONOCYTES RELATIVE PERCENT: 6 % (ref 2–12)
MONOCYTES RELATIVE PERCENT: 9 % (ref 2–12)
MYELOCYTE PERCENT: 2 % (ref 0–0)
MYELOCYTE PERCENT: 4 % (ref 0–0)
MYELOCYTE PERCENT: 4 % (ref 0–0)
NEUTROPHILS ABSOLUTE: 31.06 E9/L (ref 1.8–7.3)
NEUTROPHILS ABSOLUTE: 36.8 E9/L (ref 1.8–7.3)
NEUTROPHILS ABSOLUTE: 50.22 E9/L (ref 1.8–7.3)
NEUTROPHILS RELATIVE PERCENT: 74 % (ref 43–80)
NEUTROPHILS RELATIVE PERCENT: 80 % (ref 43–80)
NEUTROPHILS RELATIVE PERCENT: 83 % (ref 43–80)
NUCLEATED RED BLOOD CELLS: 1 /100 WBC
O2 CONTENT: 15.3 ML/DL
O2 SATURATION: 96.9 % (ref 92–98.5)
O2HB: 96.4 % (ref 94–97)
OPERATOR ID: ABNORMAL
OVALOCYTES: ABNORMAL
PATHOLOGIST REVIEW: NORMAL
PATIENT TEMP: 37 C
PCO2: 42.1 MMHG (ref 35–45)
PDW BLD-RTO: 18.6 FL (ref 11.5–15)
PDW BLD-RTO: 18.8 FL (ref 11.5–15)
PDW BLD-RTO: 19.1 FL (ref 11.5–15)
PEEP/CPAP: 5 CMH2O
PFO2: 2.22 MMHG/%
PH BLOOD GAS: 7.3 (ref 7.35–7.45)
PHOSPHORUS: 1.7 MG/DL (ref 2.5–4.5)
PLATELET # BLD: 18 E9/L (ref 130–450)
PLATELET # BLD: 19 E9/L (ref 130–450)
PLATELET # BLD: 36 E9/L (ref 130–450)
PLATELET CONFIRMATION: NORMAL
PMV BLD AUTO: 10.1 FL (ref 7–12)
PMV BLD AUTO: 10.4 FL (ref 7–12)
PMV BLD AUTO: 11.4 FL (ref 7–12)
PO2: 88.7 MMHG (ref 75–100)
POIKILOCYTES: ABNORMAL
POLYCHROMASIA: ABNORMAL
POLYCHROMASIA: ABNORMAL
POTASSIUM REFLEX MAGNESIUM: 3.8 MMOL/L (ref 3.5–5)
POTASSIUM SERPL-SCNC: 3.8 MMOL/L (ref 3.5–5)
PROTHROMBIN TIME: 18.4 SEC (ref 9.3–12.4)
PROTHROMBIN TIME: 28 SEC (ref 9.3–12.4)
RBC # BLD: 2.63 E12/L (ref 3.5–5.5)
RBC # BLD: 2.7 E12/L (ref 3.5–5.5)
RBC # BLD: 3.36 E12/L (ref 3.5–5.5)
RI(T): 156 %
RR MECHANICAL: 16 B/MIN
SODIUM BLD-SCNC: 140 MMOL/L (ref 132–146)
SOURCE, BLOOD GAS: ABNORMAL
THB: 11.2 G/DL (ref 11.5–16.5)
TIME ANALYZED: 558
TOTAL PROTEIN: 3.3 G/DL (ref 6.4–8.3)
TOXIC GRANULATION: ABNORMAL
VT MECHANICAL: 400 ML
WBC # BLD: 35.3 E9/L (ref 4.5–11.5)
WBC # BLD: 42.3 E9/L (ref 4.5–11.5)
WBC # BLD: 54 E9/L (ref 4.5–11.5)

## 2020-10-09 PROCEDURE — 2500000003 HC RX 250 WO HCPCS: Performed by: STUDENT IN AN ORGANIZED HEALTH CARE EDUCATION/TRAINING PROGRAM

## 2020-10-09 PROCEDURE — 94003 VENT MGMT INPAT SUBQ DAY: CPT

## 2020-10-09 PROCEDURE — 36415 COLL VENOUS BLD VENIPUNCTURE: CPT

## 2020-10-09 PROCEDURE — 99233 SBSQ HOSP IP/OBS HIGH 50: CPT | Performed by: INTERNAL MEDICINE

## 2020-10-09 PROCEDURE — 82330 ASSAY OF CALCIUM: CPT

## 2020-10-09 PROCEDURE — 37799 UNLISTED PX VASCULAR SURGERY: CPT

## 2020-10-09 PROCEDURE — 82962 GLUCOSE BLOOD TEST: CPT

## 2020-10-09 PROCEDURE — 83605 ASSAY OF LACTIC ACID: CPT

## 2020-10-09 PROCEDURE — C9113 INJ PANTOPRAZOLE SODIUM, VIA: HCPCS | Performed by: STUDENT IN AN ORGANIZED HEALTH CARE EDUCATION/TRAINING PROGRAM

## 2020-10-09 PROCEDURE — 86022 PLATELET ANTIBODIES: CPT

## 2020-10-09 PROCEDURE — 2580000003 HC RX 258: Performed by: FAMILY MEDICINE

## 2020-10-09 PROCEDURE — 6370000000 HC RX 637 (ALT 250 FOR IP): Performed by: STUDENT IN AN ORGANIZED HEALTH CARE EDUCATION/TRAINING PROGRAM

## 2020-10-09 PROCEDURE — 71045 X-RAY EXAM CHEST 1 VIEW: CPT

## 2020-10-09 PROCEDURE — 85610 PROTHROMBIN TIME: CPT

## 2020-10-09 PROCEDURE — 2000000000 HC ICU R&B

## 2020-10-09 PROCEDURE — 6360000002 HC RX W HCPCS: Performed by: STUDENT IN AN ORGANIZED HEALTH CARE EDUCATION/TRAINING PROGRAM

## 2020-10-09 PROCEDURE — 2500000003 HC RX 250 WO HCPCS: Performed by: INTERNAL MEDICINE

## 2020-10-09 PROCEDURE — 82805 BLOOD GASES W/O2 SATURATION: CPT

## 2020-10-09 PROCEDURE — 85384 FIBRINOGEN ACTIVITY: CPT

## 2020-10-09 PROCEDURE — 93971 EXTREMITY STUDY: CPT

## 2020-10-09 PROCEDURE — 82570 ASSAY OF URINE CREATININE: CPT

## 2020-10-09 PROCEDURE — 2500000003 HC RX 250 WO HCPCS: Performed by: FAMILY MEDICINE

## 2020-10-09 PROCEDURE — 2580000003 HC RX 258: Performed by: INTERNAL MEDICINE

## 2020-10-09 PROCEDURE — 93970 EXTREMITY STUDY: CPT

## 2020-10-09 PROCEDURE — 6360000002 HC RX W HCPCS: Performed by: INTERNAL MEDICINE

## 2020-10-09 PROCEDURE — 84100 ASSAY OF PHOSPHORUS: CPT

## 2020-10-09 PROCEDURE — 80053 COMPREHEN METABOLIC PANEL: CPT

## 2020-10-09 PROCEDURE — 36430 TRANSFUSION BLD/BLD COMPNT: CPT

## 2020-10-09 PROCEDURE — C9113 INJ PANTOPRAZOLE SODIUM, VIA: HCPCS | Performed by: INTERNAL MEDICINE

## 2020-10-09 PROCEDURE — 85025 COMPLETE CBC W/AUTO DIFF WBC: CPT

## 2020-10-09 PROCEDURE — 2580000003 HC RX 258: Performed by: SPECIALIST

## 2020-10-09 PROCEDURE — 83735 ASSAY OF MAGNESIUM: CPT

## 2020-10-09 PROCEDURE — 6360000002 HC RX W HCPCS: Performed by: SPECIALIST

## 2020-10-09 PROCEDURE — 6360000002 HC RX W HCPCS: Performed by: FAMILY MEDICINE

## 2020-10-09 PROCEDURE — 85730 THROMBOPLASTIN TIME PARTIAL: CPT

## 2020-10-09 RX ORDER — 0.9 % SODIUM CHLORIDE 0.9 %
20 INTRAVENOUS SOLUTION INTRAVENOUS ONCE
Status: DISCONTINUED | OUTPATIENT
Start: 2020-10-09 | End: 2020-10-12

## 2020-10-09 RX ORDER — 0.9 % SODIUM CHLORIDE 0.9 %
20 INTRAVENOUS SOLUTION INTRAVENOUS ONCE
Status: DISCONTINUED | OUTPATIENT
Start: 2020-10-09 | End: 2020-10-09

## 2020-10-09 RX ORDER — MAGNESIUM SULFATE IN WATER 40 MG/ML
2 INJECTION, SOLUTION INTRAVENOUS ONCE
Status: COMPLETED | OUTPATIENT
Start: 2020-10-09 | End: 2020-10-09

## 2020-10-09 RX ORDER — PANTOPRAZOLE SODIUM 40 MG/10ML
40 INJECTION, POWDER, LYOPHILIZED, FOR SOLUTION INTRAVENOUS 2 TIMES DAILY
Status: DISCONTINUED | OUTPATIENT
Start: 2020-10-09 | End: 2020-10-11

## 2020-10-09 RX ADMIN — Medication 175 MCG/HR: at 19:59

## 2020-10-09 RX ADMIN — Medication 10 ML: at 07:57

## 2020-10-09 RX ADMIN — POTASSIUM CHLORIDE: 2 INJECTION, SOLUTION, CONCENTRATE INTRAVENOUS at 18:01

## 2020-10-09 RX ADMIN — Medication 10 ML: at 21:08

## 2020-10-09 RX ADMIN — CHLORHEXIDINE GLUCONATE 0.12% ORAL RINSE 15 ML: 1.2 LIQUID ORAL at 07:57

## 2020-10-09 RX ADMIN — SODIUM PHOSPHATE, MONOBASIC, MONOHYDRATE AND SODIUM PHOSPHATE, DIBASIC, ANHYDROUS 20 MMOL: 276; 142 INJECTION, SOLUTION INTRAVENOUS at 08:33

## 2020-10-09 RX ADMIN — Medication 150 MCG/HR: at 09:55

## 2020-10-09 RX ADMIN — PIPERACILLIN SODIUM AND TAZOBACTAM SODIUM 3.38 G: 3; .375 INJECTION, POWDER, LYOPHILIZED, FOR SOLUTION INTRAVENOUS at 03:01

## 2020-10-09 RX ADMIN — VASOPRESSIN 0.03 UNITS/MIN: 20 INJECTION INTRAVENOUS at 14:48

## 2020-10-09 RX ADMIN — METRONIDAZOLE 500 MG: 500 INJECTION, SOLUTION INTRAVENOUS at 21:08

## 2020-10-09 RX ADMIN — PIPERACILLIN SODIUM AND TAZOBACTAM SODIUM 3.38 G: 3; .375 INJECTION, POWDER, LYOPHILIZED, FOR SOLUTION INTRAVENOUS at 11:14

## 2020-10-09 RX ADMIN — MAGNESIUM SULFATE 2 G: 2 INJECTION INTRAVENOUS at 08:33

## 2020-10-09 RX ADMIN — POTASSIUM CHLORIDE AND SODIUM CHLORIDE: 900; 150 INJECTION, SOLUTION INTRAVENOUS at 14:49

## 2020-10-09 RX ADMIN — METRONIDAZOLE 500 MG: 500 INJECTION, SOLUTION INTRAVENOUS at 03:58

## 2020-10-09 RX ADMIN — CHLORHEXIDINE GLUCONATE 0.12% ORAL RINSE 15 ML: 1.2 LIQUID ORAL at 21:08

## 2020-10-09 RX ADMIN — SODIUM CHLORIDE: 9 INJECTION, SOLUTION INTRAVENOUS at 06:57

## 2020-10-09 RX ADMIN — Medication 150 MCG/HR: at 13:24

## 2020-10-09 RX ADMIN — VASOPRESSIN 0.04 UNITS/MIN: 20 INJECTION INTRAVENOUS at 05:21

## 2020-10-09 RX ADMIN — POTASSIUM CHLORIDE AND SODIUM CHLORIDE: 900; 150 INJECTION, SOLUTION INTRAVENOUS at 03:02

## 2020-10-09 RX ADMIN — MINERAL OIL AND WHITE PETROLATUM: 150; 830 OINTMENT OPHTHALMIC at 02:31

## 2020-10-09 RX ADMIN — PIPERACILLIN SODIUM AND TAZOBACTAM SODIUM 3.38 G: 3; .375 INJECTION, POWDER, LYOPHILIZED, FOR SOLUTION INTRAVENOUS at 19:30

## 2020-10-09 RX ADMIN — METRONIDAZOLE 500 MG: 500 INJECTION, SOLUTION INTRAVENOUS at 11:14

## 2020-10-09 RX ADMIN — Medication 35 MCG/HR: at 03:01

## 2020-10-09 RX ADMIN — SODIUM CHLORIDE: 9 INJECTION, SOLUTION INTRAVENOUS at 23:09

## 2020-10-09 RX ADMIN — PANTOPRAZOLE SODIUM 40 MG: 40 INJECTION, POWDER, FOR SOLUTION INTRAVENOUS at 21:08

## 2020-10-09 RX ADMIN — Medication 175 MCG/HR: at 17:00

## 2020-10-09 RX ADMIN — Medication 175 MCG/HR: at 23:08

## 2020-10-09 RX ADMIN — PANTOPRAZOLE SODIUM 40 MG: 40 INJECTION, POWDER, FOR SOLUTION INTRAVENOUS at 08:33

## 2020-10-09 RX ADMIN — CALCIUM GLUCONATE 2 G: 98 INJECTION, SOLUTION INTRAVENOUS at 10:50

## 2020-10-09 RX ADMIN — Medication 150 MCG/HR: at 06:14

## 2020-10-09 RX ADMIN — Medication 35 MCG/HR: at 00:14

## 2020-10-09 ASSESSMENT — PULMONARY FUNCTION TESTS
PIF_VALUE: 20
PIF_VALUE: 20
PIF_VALUE: 21
PIF_VALUE: 20
PIF_VALUE: 20
PIF_VALUE: 21
PIF_VALUE: 20
PIF_VALUE: 22
PIF_VALUE: 20
PIF_VALUE: 21
PIF_VALUE: 22
PIF_VALUE: 23
PIF_VALUE: 20
PIF_VALUE: 21
PIF_VALUE: 21
PIF_VALUE: 26
PIF_VALUE: 20
PIF_VALUE: 21
PIF_VALUE: 19
PIF_VALUE: 22
PIF_VALUE: 21
PIF_VALUE: 23
PIF_VALUE: 24

## 2020-10-09 ASSESSMENT — PAIN SCALES - GENERAL
PAINLEVEL_OUTOF10: 0

## 2020-10-09 NOTE — PROGRESS NOTES
Patient's daughter notified RN that she took home glasses, bilateral hearing aids, top and bottom dentures, and neck pillow yesterday.

## 2020-10-09 NOTE — PROGRESS NOTES
SURGERY  DAILY PROGRESS NOTE  10/9/2020        Chief Complaint   Patient presents with    Hypotension    Diarrhea       Subjective:  Patient on levo and vaso, follow commands yesterday. Intubated, sedated. Objective:  BP (!) 105/46   Pulse 117   Temp 98.7 °F (37.1 °C) (Oral)   Resp 15   Ht 5' 6\" (1.676 m)   Wt 212 lb 15.4 oz (96.6 kg)   SpO2 96%   BMI 34.37 kg/m²     GENERAL: Intubated sedated  LUNGS:  No increased work of breathing on ventilator  CARDIOVASCULAR:  Afib RVR, HR 110s. ABDOMEN:  Soft, abthera in place, ND. ~1.3 serosanguinous output      Assessment/Plan:  82 y.o. female with septic shock nowand c. Diff colitis s/p total colectomy, left in discontinuity with abthera placement 10/7       - Abx per ID  - Minimal UOP  - Appreciated critical care  - Appreciate consultant recommendations  - left in discontinuity, will need to return to OR for end ileostomy creation, washout, plan for this afternoon vs tomorrow, may need continued resuscitation  - plt 18, will need transfusion of plt if going to OR today    Electronically signed by Veronica Leahy MD on 10/9/2020 at 5:25 AM   Attending Physician Statement:    Chief Complaint:   Chief Complaint   Patient presents with    Hypotension    Diarrhea       I have examined the patient and performed the key aspects of physical exam, reviewed the record (including all pertinent and new radiology images and laboratory findings), and discussed the case with the surgical team.  I agree with the assessment and plan with the following additions, corrections, and changes. 14pt review of symptoms completed and negative except as mentioned. Pressures have been weaned down to just vaso-this afternoon. However platelets were 18 and then only 36 after transfusion. INR also increased but responded to FFP. Will need to continue blood product resuscitation due to consumptive coagulopathy prior to returning to the OR.   Tentatively back for tomorrow if appropriate. TPN. Guarded prognosis    Anibal Vargas MD  10/09/20  8:48 PM    NOTE: This report, in part or full, may have been transcribed using voice recognition software. Every effort was made to ensure accuracy; however, inadvertent computerized transcription errors may be present. Please excuse any transcriptional grammatical or spelling errors that may have escaped my editorial review.

## 2020-10-09 NOTE — PLAN OF CARE
Problem: Inadequate oral food/beverage intake (NI-2.1)  Goal: Food and/or Nutrient Delivery  Pt will tolerate TPN for nutrition support  Description: Individualized approach for food/nutrient provision.   Outcome: Ongoing

## 2020-10-09 NOTE — PROGRESS NOTES
Critical Care Team - Daily Progress Note      Date and time: 10/9/2020 6:04 AM  Patient's name:  Corinne Gibson  Medical Record Number: [id number]  Patient's account/billing number: [de-identified]  Patient's YOB: 1935  Age: 82 y.o. Date of Admission: 10/4/2020  4:26 PM  Length of stay during current admission: 5      Primary Care Physician: Omega Glass MD  ICU Attending Physician: Dr. Daniel Watters Status: Limited    Reason for ICU admission: septic shock, c.diff colitis   post operative respiratory failure requiring Mechanical Ventilation s/p total colectomy       SUBJECTIVE:   The patient is a 82 y. o. female with significant past medical history of A-fib, HTN, and anemia that presented to the ER due to hypotension and diarrhea. Patient was recently diagnosed with UTI and was placed on antibiotics. Patient has been having diarrhea for a few weeks. Patient has been having diffuse cramping abdominal pain with no radiation. Patient was found to be guaiac positive in the ER. Patient was transferred out of the ICU on 10/06. On 10/07, patient had an increase in abdominal pain and peritonitis, so she was brought to the OR. Patient underwent a total colectomy with abthera open abdominal dressing. Patient was re-admitted to the ICU due to need for mechanical ventilation. OVERNIGHT EVENTS:        10/09/2020   No acute events overnight.  Stable on levophed and vasopressin    AWAKE & FOLLOWING COMMANDS:  [x] No   [] Yes    CURRENT VENTILATION STATUS:     [x] Ventilator  [] BIPAP  [] Nasal Cannula [] Room Air      IF INTUBATED, ET TUBE MARKING AT LOWER LIP:       cms    SECRETIONS Amount:  [] Small [] Moderate  [] Large  [x] None  Color:     [] White [] Colored  [] Bloody    SEDATION:  RAAS Score:  [x] Fentanyl [] Versed gtt  [] Ativan gtt   [] No Sedation    PARALYZED:  [x] No    [] Yes    DIARRHEA:                [x] No                [] Yes  (C. Difficile status: [] positive [] negative                                                                                                                     [] pending)    VASOPRESSORS:  [] No    [x] Yes    If yes -   [x] Levophed       [] Dopamine     [x] Vasopressin       [] Dobutamine  [] Phenylephrine         [] Epinephrine    CENTRAL LINES:     [] No   [x] Yes   (Date of Insertion: 10/04/2020  )           If yes -     [x] Right IJ     [] Left IJ [] Right Femoral [] Left Femoral                   [] Right Subclavian [] Left Subclavian       WEST'S CATHETER:   [] No   [x] Yes  (Date of Insertion: 10/04/2020 )     URINE OUTPUT:            [] Good   [x] Low              [] Anuric      OBJECTIVE:     VITAL SIGNS:  BP (!) 105/46   Pulse 117   Temp 98.7 °F (37.1 °C) (Oral)   Resp 15   Ht 5' 6\" (1.676 m)   Wt 212 lb 15.4 oz (96.6 kg)   SpO2 96%   BMI 34.37 kg/m²   Tmax over 24 hours:  Temp (24hrs), Av.1 °F (36.7 °C), Min:97.6 °F (36.4 °C), Max:98.8 °F (37.1 °C)      Patient Vitals for the past 6 hrs:   Temp Temp src Pulse Resp SpO2   10/09/20 0500 -- -- 117 15 96 %   10/09/20 0440 -- -- 115 15 96 %   10/09/20 0400 98.7 °F (37.1 °C) Oral 132 16 96 %   10/09/20 0300 -- -- 102 16 96 %   10/09/20 0200 -- -- 123 15 96 %   10/09/20 0108 -- -- 99 -- --   10/09/20 0100 -- -- 107 15 96 %         Intake/Output Summary (Last 24 hours) at 10/9/2020 0604  Last data filed at 10/9/2020 0515  Gross per 24 hour   Intake 43772.81 ml   Output 2665 ml   Net 9193.81 ml     Wt Readings from Last 2 Encounters:   10/09/20 212 lb 15.4 oz (96.6 kg)     Body mass index is 34.37 kg/m². PHYSICAL EXAMINATION:  BP (!) 105/46   Pulse 117   Temp 98.7 °F (37.1 °C) (Oral)   Resp 15   Ht 5' 6\" (1.676 m)   Wt 212 lb 15.4 oz (96.6 kg)   SpO2 96%   BMI 34.37 kg/m²   Physical Exam  Vitals signs reviewed.    Constitutional:       Appearance: She is 40 %  SpO2: 96 %  SpO2/FiO2 ratio: 240  Sensitivity: 3  PEEP/CPAP: 5  I Time/ I Time %: 0 s  Humidification Source: HME  Additional Respiratory  Assessments  Pulse: 117  Resp: 15  SpO2: 96 %  Position: Semi-Forbes's  Humidification Source: HME  Oral Care: Mouth suctioned, Mouth moisturizer, Mouth swabbed    ABGs:     Recent Labs     10/09/20  0558   PH 7.304*   PCO2 42.1   PO2 88.7   HCO3 20.4*   BE -5.6*   O2SAT 96.9         Laboratory findings:    Complete Blood Count:   Recent Labs     10/07/20  1230 10/07/20  2210 10/08/20  0622 10/08/20  1805   WBC 21.4* 33.2* 52.4*  --    HGB 7.6* 11.8 11.3* 9.3*   HCT 23.3* 36.8 34.6 27.7*   * 73* 51*  --         Last 3 Blood Glucose:   Recent Labs     10/07/20  2210 10/08/20  0622 10/08/20  1600   GLUCOSE 124* 200* 258*        PT/INR:    Lab Results   Component Value Date    PROTIME 18.3 10/07/2020    INR 1.6 10/07/2020     PTT:    Lab Results   Component Value Date    APTT 58.6 10/07/2020       Comprehensive Metabolic Profile:   Recent Labs     10/07/20  2210 10/08/20  0622 10/08/20  1600    139 136   K 4.0 3.8 3.3*   * 109* 108*   CO2 17* 18* 18*   BUN 34* 32* 29*   CREATININE 0.9 1.0 0.9   GLUCOSE 124* 200* 258*   CALCIUM 7.3* 7.1* 6.2*   PROT  --  3.8*  --    LABALBU  --  2.3*  --    BILITOT  --  2.2*  --    ALKPHOS  --  105*  --    AST  --  23  --    ALT  --  11  --       Magnesium:   Lab Results   Component Value Date    MG 1.9 10/08/2020     Phosphorus:   Lab Results   Component Value Date    PHOS 2.6 10/08/2020     Ionized Calcium:   Lab Results   Component Value Date    CAION 0.95 10/08/2020        Urinalysis:     Troponin: No results for input(s): TROPONINI in the last 72 hours.     Microbiology:    Cultures during this admission:     Blood cultures:                 [] None drawn      [] Negative             []  Positive (Details:  )  Urine Culture:                   [] None drawn      [] Negative             []  Positive (Details:  )  Sputum Culture:               [] None drawn       [] Negative             []  Positive (Details:  )   Endotracheal aspirate:     [] None drawn       [] Negative             []  Positive (Details:  )     Other pertinent Labs:   C-Diff +    Radiology/Imaging:   Xr Abdomen (kub) (single Ap View)    Result Date: 10/7/2020  EXAMINATION: ONE SUPINE XRAY VIEW(S) OF THE ABDOMEN 10/7/2020 10:01 am COMPARISON: Abdomen radiograph 10/06/2020 and abdomen CT 10/04/2020 HISTORY: ORDERING SYSTEM PROVIDED HISTORY: abd pain 10/10 TECHNOLOGIST PROVIDED HISTORY: Reason for exam:->abd pain 10/10 FINDINGS: Again noted is gas-filled distention of the transverse colon without significant change from yesterday x-ray. Mural thumbprinting sign is again present in the transverse, descending, and sigmoid colon region. Slight prominence of small bowel gas without distention. No radiographic evidence of mass or urologically significant calcification. Left lung base remains opacified with consolidation, non-specific. Transverse, descending, and sigmoid colon mural thickening may reflect edema and/or inflammation/infection, without significant interval change. Slight prominence of gas throughout the small intestine, without gross distension, suggestive of paralytic ileus. Persisting left lung base opacity may be atelectasis with pleural effusion. Differential again includes infectious infiltrate     Xr Abdomen (kub) (single Ap View)    Result Date: 10/6/2020  EXAMINATION: ONE SUPINE XRAY VIEW(S) OF THE ABDOMEN 10/6/2020 11:11 am COMPARISON: CT abdomen pelvis October 4, 2020 HISTORY: ORDERING SYSTEM PROVIDED HISTORY: white count TECHNOLOGIST PROVIDED HISTORY: Reason for exam:->white count FINDINGS: The stomach is not dilated. There is a prominent loop of transverse colon with thumbprinting sign. The small bowels are normal caliber. No abnormal intra-abdominal calcifications. Thickening of the transverse colon wall.   Consistent with inflammation identified on prior CT. Ct Abdomen Pelvis W Iv Contrast Additional Contrast? None    Result Date: 10/4/2020  EXAMINATION: CT OF THE ABDOMEN AND PELVIS WITH CONTRAST 10/4/2020 7:53 pm TECHNIQUE: CT of the abdomen and pelvis was performed with the administration of intravenous contrast. Multiplanar reformatted images are provided for review. Dose modulation, iterative reconstruction, and/or weight based adjustment of the mA/kV was utilized to reduce the radiation dose to as low as reasonably achievable. COMPARISON: None. HISTORY: ORDERING SYSTEM PROVIDED HISTORY: abd pain, hypotension, gi bleed TECHNOLOGIST PROVIDED HISTORY: Reason for exam:->abd pain, hypotension, gi bleed Additional Contrast?->None FINDINGS: The lung bases demonstrate cardiomegaly with a tiny pericardial effusion. There is minimal atelectasis and pleural effusion in the lung bases left more than right. The liver is fatty infiltrated. The gallbladder is distended without acute inflammation. Consider ultrasonography for better assessment of gallbladder. Spleen, pancreas, the adrenals and the kidneys are normal except for a 1.5 cm cystic lesion in the right kidney. There is degenerative changes in the lumbar spine. .  The urinary bladder is contracted with a Martin catheter and wall thickening. There is diffuse thickening and inflammation of the transverse colon, descending and rectosigmoid colon with hyperdensity concerning for active bleeding/contrast extravasation. There is inflammatory changes in the presacral area. A tiny air bubble is identified in the rectovesical pouch which could represent waled off microperforation. Appendix cannot be identified. There is some inflammatory changes in the gluteus region     Diffuse inflammation of the transverse colon, descending and rectosigmoid colon with wall thickening and edema with active contrast extravasation/bleeding. Hemorrhagic diverticulitis or colitis are considered.   There is some edema in the presacral area and tiny amount of air collection in the rectovesical pouch. Walled-off microperforation is considered. Distended gallbladder. Atelectasis/pleural effusion in the left lung base. RECOMMENDATIONS: The clinical service was notified     Ct Abdomen Pelvis W Iv Contrast Additional Contrast? None    Addendum Date: 10/4/2020    ADDENDUM: In the title of the examination, disregard the CT abdomen and pelvis. Result Date: 10/4/2020  EXAMINATION: ONE XRAY VIEW OF THE CHEST; CT OF THE ABDOMEN AND PELVIS WITH CONTRAST 10/4/2020 7:06 pm COMPARISON: October 4, 2020 HISTORY: ORDERING SYSTEM PROVIDED HISTORY: post R IJ CVC placement TECHNOLOGIST PROVIDED HISTORY: Reason for exam:->post R IJ CVC placement     There is a borderline cardiac size. There is atelectasis in the lung bases with a small left pleural effusion. Left subclavian pacemaker is unchanged. Right IJ central line is noted with the tip at the atrial caval junction. There is no pneumothorax. RECOMMENDATION: Atelectasis and pleural effusion in the lung bases more on the left side. Right IJ central line has noted without complications. Us Gallbladder Ruq    Result Date: 10/6/2020  EXAMINATION: RIGHT UPPER QUADRANT ULTRASOUND 10/5/2020 10:46 pm COMPARISON: CT abdomen pelvis 10/04/2020 HISTORY: ORDERING SYSTEM PROVIDED HISTORY: Elevated alk phos, LUQ pain TECHNOLOGIST PROVIDED HISTORY: Reason for exam:->Elevated alk phos, LUQ pain What reading provider will be dictating this exam?->CRC FINDINGS: LIVER:  Normal liver size and contour. Increased parenchymal echogenicity. No focal lesion. BILIARY SYSTEM:  No intra or extrahepatic bile duct dilatation. Common bile duct measures 5 mm. Gallbladder contains shadowing dependent gallstones. No wall thickening or pericholecystic fluid. Reported positive sonographic Hopson's sign. RIGHT KIDNEY: Right kidney measures 10.1 x 4.5 x 5.2 cm.   Anechoic cyst at the superior pole measures up to 15 mm.  An echogenic mid polar region focus measures 9 x 9 x 9 mm and correlates with hypodensity on CT, probably a tiny angiomyolipoma. PANCREAS:  Visualized portions of the pancreas are unremarkable. OTHER: Right pleural effusions suspected. No ascites. Cholelithiasis with positive sonographic Hopson's sign but no wall thickening or pericholecystic inflammatory changes, altogether equivocal for cholecystitis. Consider functional analysis with nuclear medicine HIDA scan. Hepatic steatosis. 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 Suspected small right pleural effusion. Xr Chest Portable    Result Date: 10/8/2020  EXAMINATION: ONE XRAY VIEW OF THE CHEST 10/8/2020 6:47 am COMPARISON: 10/07/2020 HISTORY: ORDERING SYSTEM PROVIDED HISTORY: intubated TECHNOLOGIST PROVIDED HISTORY: Reason for exam:->intubated FINDINGS: There is stable position of the support lines and tubes. There is minimal atelectasis seen within the right lung base unchanged when compared to prior study. There is a small left pleural effusion and left lung base atelectasis. Left upper lobe is clear. These findings are also unchanged. 1. No interval change in the small left pleural effusion and left lung base atelectasis. 2. No change in the minimal right lung base atelectasis. Xr Chest Portable    Result Date: 10/7/2020  EXAMINATION: ONE XRAY VIEW OF THE CHEST 10/7/2020 8:17 pm COMPARISON: October 4, 2020 HISTORY: ORDERING SYSTEM PROVIDED HISTORY: intbuated TECHNOLOGIST PROVIDED HISTORY: Reason for exam:->intbuated FINDINGS: Left pacemaker is present. Endotracheal tube is present with distal tip approximately 3 cm above the bernadette. Nasogastric tube courses below the level of the diaphragm. Right IJ central venous catheter is present with distal tip at location of superior vena cava. There are persistent opacities at left lung base silhouetting left hemidiaphragm and left costophrenic sulcus.   The heart appears normal in size. No pneumothorax. Opacities are present in left lung base which appear to have increased in could suggest increasing pneumonia, atelectasis, or effusion. Xr Chest Portable    Addendum Date: 10/4/2020    ADDENDUM: In the title of the examination, disregard the CT abdomen and pelvis. Result Date: 10/4/2020  EXAMINATION: ONE XRAY VIEW OF THE CHEST; CT OF THE ABDOMEN AND PELVIS WITH CONTRAST 10/4/2020 7:06 pm COMPARISON: October 4, 2020 HISTORY: ORDERING SYSTEM PROVIDED HISTORY: post R IJ CVC placement TECHNOLOGIST PROVIDED HISTORY: Reason for exam:->post R IJ CVC placement     There is a borderline cardiac size. There is atelectasis in the lung bases with a small left pleural effusion. Left subclavian pacemaker is unchanged. Right IJ central line is noted with the tip at the atrial caval junction. There is no pneumothorax. RECOMMENDATION: Atelectasis and pleural effusion in the lung bases more on the left side. Right IJ central line has noted without complications. Xr Chest 1 View    Result Date: 10/4/2020  EXAMINATION: ONE XRAY VIEW OF THE CHEST 10/4/2020 4:06 pm COMPARISON: None. HISTORY: ORDERING SYSTEM PROVIDED HISTORY: fatigue TECHNOLOGIST PROVIDED HISTORY: Reason for exam:->fatigue FINDINGS: Cardiac silhouette is not enlarged. Dual-chamber pacemaker in place. Pulmonary vasculature within normal limits. Blunting of the left costophrenic angle and opacity along the left costal margin most likely small left pleural effusion. No airspace consolidation. Suspect left pleural effusion. No airspace consolidation. Dual-chamber pacemaker in place. Follow-up PA and lateral radiographs may be helpful in further evaluation. Xr Chest Abdomen Ng Placement    Result Date: 10/7/2020  EXAMINATION: ONE SUPINE XRAY VIEW(S) OF THE ABDOMEN 10/7/2020 2:07 pm COMPARISON: None.  HISTORY: ORDERING SYSTEM PROVIDED HISTORY: NGT Placement TECHNOLOGIST PROVIDED HISTORY: Reason for exam:->NGT Placement What reading provider will be dictating this exam?->CRC FINDINGS: Nasogastric tube courses below the level of the diaphragm with distal tip in the expected location of the stomach. Satisfactory position of nasogastric tube. Chest Xray (10/9/2020):   Lines/tubes are appropriate.  Moderate left effusion and left lower lobe    infiltrate are unchanged.  There is a tiny right effusion.  Heart size is    normal        ASSESSMENT:     Neuro   Intubated and Sedated             -On Fentanyl            -RASS -2        Respiratory   Mechanical ventilation            -Intubated in OR 10/07-day 3            -Wean as tolerated             -CXR daily             -ABG 7.30/42.1/88.7/20.4    -Advanced ET 1 cm today      Metabolic Acidosis   Likely due to sepsis; improving             -LA 4.1            -Bicarb drip stopped overnight       Cardiovascular   A-fib with RVR   Hx of A-fib, on Sotalol and Metoprolol at home    -Digoxin 250 mg every 3 hours PRN ordered    -No doses given            -Cardiology following     Septic Shock  Likely due to fulminant colitis                -On Levophed and Vasopressin    -Wean as tolerated      DVT prophylaxis held due to low platelet     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    Fulminant Colitis  S/p total colectomy on 10/07     -Back to OR today for colostomy      -Wound vac- 1800 cc total               -General surgery following     Protonix PPx     Renal   QI   Resolved              -Cre 0.9 today              Metabolic acidosis  Likely due to diarrhea and sepsis            -Improving     -Off bicarb drip overnight     Hypophosphatemia    -Phos 1.7   -Sodium Phosphate 20 mmol given today   -Replete as needed    Hypomagnesemia    -Mag 1.7 today   -Keep magnesium > 2   -Mag 2 g IV given today   -Replete as needed        Infectious Disease   Septic Shock   Likely due to fulminant colitis   Improving              -WBC worsening; WBC 54.0              -On Zosyn day 2, Flagyl day 3       -Blood cultures show no growth in 24 hours     Fulminant Colitis   Hx of Ulcerative colitis  S/p total colectomy on 10/07              -C-diff positive               -On Flagyl IV day 3, Zosyn day 2          Hematology/Oncology   Anemia  Likely multi-factorial- GI bleed, anemia of chronic disease              -Hgb 10.0              -Transfuse < 7               -CBC daily     Thrombocytopenia  Likely due to septic shock      -Platelets 18 today      -Getting 2 units of platelets prior to surgery today      -Transfuse < 10      -Lovenox held; PCD's in place      -Check fibrinogen and INR today      Endocrine   No acute issues  Keep blood glucose < 180     Social/Spiritual/DNR/Other   Limited code  NPO  Dietary consulted for TPN      Lines:  CVC RIJ 10/04/2020- day 6  Martin 10/04/2020- day 6  Midline Right- day 5  Left radial Art- day 3    Consults:  Infectious disease  Cardiology  GI  General surgery     PLAN:     WEAN PER PROTOCOL:  [x] No   [] Yes  [] N/A    DISCONTINUE ANY LABS:   [x] No   [] Yes    ICU PROPHYLAXIS:  Stress ulcer:  [x] PPI Agent  [] [id number] [] Sucralfate  [] Other:  VTE:   [] Enoxaparin  [] Unfract. Heparin Subcut  [x] EPC Cuffs    NUTRITION:  [x] NPO [] Tube Feeding (Specify: ) [] TPN  [] PO (Diet: Diet NPO Effective Now Exceptions are: Sips with Meds)    HOME MEDICATIONS RECONCILED: [x] No  [] Yes    INSULIN DRIP:   [x] No   [] Yes    CONSULTATION NEEDED:  [x] No   [] Yes    FAMILY UPDATED:    [] No   [x] Yes    TRANSFER OUT OF ICU:   [x] No   [] Yes    ADDITIONAL PLAN:    Isauro Reyes MD, PGY-2                    10/9/2020, 6:05 AM     I personally saw, examined and provided care for the patient. Radiographs, labs and medication list were reviewed by me independently. I spoke with bedside nursing, therapists and consultants. Critical care services and times documented are independent of procedures and multidisciplinary rounds with Residents.  Additionally comprehensive, multidisciplinary rounds were conducted with the MICU team. The case was discussed in detail and plans for care were established. Review of Residents documentation was conducted and revisions were made as appropriate. I agree with the above documented exam, problem list and plan of care. dic vs hit replace ffp and platelets   Hit panel pending   OR in am if stable   Case discussed with general surgery   Will need anticoagulation when with argatroban gtt   Family up dated   Critically ill prognosis guarded     Vandy Burkitt, M.D.    Pulmonary/Critical Care Medicine   45 min cct excluding procedures

## 2020-10-09 NOTE — PROGRESS NOTES
carbonate 600 MG TABS tablet Take 1 tablet by mouth daily   Yes Historical Provider, MD   ferrous sulfate (IRON 325) 325 (65 Fe) MG tablet Take 325 mg by mouth daily (with breakfast)   Yes Historical Provider, MD   lisinopril (PRINIVIL;ZESTRIL) 40 MG tablet Take 40 mg by mouth daily   Yes Historical Provider, MD   melatonin 3 MG TABS tablet Take 6 mg by mouth daily   Yes Historical Provider, MD   tamsulosin (FLOMAX) 0.4 MG capsule Take 0.4 mg by mouth nightly    Historical Provider, MD   loperamide (IMODIUM) 2 MG capsule Take 2 mg by mouth every 12 hours as needed for Diarrhea    Historical Provider, MD   mesalamine (PENTASA) 500 MG extended release capsule Take 500 mg by mouth 4 times daily    Historical Provider, MD   cefTRIAXone (ROCEPHIN) 1 g injection Infuse 1 g intravenously every 24 hours    Historical Provider, MD   sotalol (BETAPACE) 120 MG tablet Take 120 mg by mouth 2 times daily    Historical Provider, MD   ondansetron (ZOFRAN) 4 MG tablet Take 4 mg by mouth every 6 hours as needed for Nausea or Vomiting    Historical Provider, MD       Current Medications:  Current Facility-Administered Medications   Medication Dose Route Frequency Provider Last Rate Last Dose    0.9 % sodium chloride bolus  20 mL Intravenous Once Leonidas Edwards MD        sodium chloride flush 0.9 % injection 10 mL  10 mL Intravenous BID Zhang Watt MD   10 mL at 10/09/20 0757    vasopressin 20 Units in dextrose 5 % 100 mL infusion  0.04 Units/min Intravenous Continuous Zhang Watt MD 12 mL/hr at 10/09/20 0521 0.04 Units/min at 10/09/20 0521    digoxin (LANOXIN) injection 250 mcg  250 mcg Intravenous Q2H PRN Coral Cast MD        piperacillin-tazobactam (ZOSYN) 3.375 g in dextrose 5 % 50 mL IVPB extended infusion (mini-bag)  3.375 g Intravenous Q8H Mellissa Gao MD 12.5 mL/hr at 10/09/20 1114 3.375 g at 10/09/20 1114    0.9 % sodium chloride infusion   Intravenous Q8H Mellissa Gao MD 12.5 mL/hr thyromegaly, no adenopathy  Cardiac:irreg irreg , tachy, normal S1 and physiologically split S2, no S3, no S4. Apical impulse is nondisplaced. No murmurs, no pericardial rubs, no clicks. Carotid upstrokes brisk. Respiratory: Clear anteriorly no wheezes, no rales, no rhonchi. On vent  Abdomen: s/p colectomy  Extremities: No edema, no cyanosis, no clubbing. Distal pulses intact  Skin: Intact, warm and dry, no rashes, no breakdown  Musculoskeletal: normal tone and strength in the upper and lower extremities bilaterally  Neuro:not responsive on propofol at present    Intake/Output:    Intake/Output Summary (Last 24 hours) at 10/9/2020 1331  Last data filed at 10/9/2020 1300  Gross per 24 hour   Intake 9641.31 ml   Output 1705 ml   Net 7936.31 ml     I/O this shift:  In: 1137.5 [Blood:1087.5; IV Piggyback:50]  Out: 130 [Urine:130]    Laboratory Tests:  Last 3 CBC:  Recent Labs     10/07/20  2210 10/08/20  0622 10/08/20  1805 10/09/20  0515   WBC 33.2* 52.4*  --  54.0*   RBC 4.01 3.83  --  3.36*   HGB 11.8 11.3* 9.3* 10.1*   HCT 36.8 34.6 27.7* 30.4*   MCV 91.8 90.3  --  90.5   MCH 29.4 29.5  --  30.1   MCHC 32.1 32.7  --  33.2   RDW 17.7* 17.7*  --  18.6*   PLT 73* 51*  --  18*   MPV 9.3 10.3  --  11.4       Last 3 CMP:    Recent Labs     10/07/20  1230  10/08/20  0622 10/08/20  1600 10/09/20  0515      < > 139 136 140   K 3.3*   < > 3.8 3.3* 3.8  3.8      < > 109* 108* 110*   CO2 19*   < > 18* 18* 21*   BUN 36*   < > 32* 29* 29*   CREATININE 0.9   < > 1.0 0.9 0.9   GLUCOSE 84   < > 200* 258* 159*   CALCIUM 8.1*   < > 7.1* 6.2* 6.4*   PROT 4.6*  --  3.8*  --  3.3*   LABALBU 3.3*  --  2.3*  --  1.7*   BILITOT 1.4*  --  2.2*  --  1.7*   ALKPHOS 78  --  105*  --  116*   AST 10  --  23  --  26   ALT 7  --  11  --  11    < > = values in this interval not displayed.        Last 3 Mag/Phos:  Recent Labs     10/07/20  0600 10/08/20  0622 10/09/20  0515   MG 2.1 1.9 1.7   PHOS 2.4* 2.6 1.7*       Last 3 CK, CKMB, Troponin  No results for input(s): CKTOTAL, CKMB, TROPONINI in the last 72 hours. Last 3 Pro-BNP:  No results for input(s): PROBNP in the last 72 hours. No results found for: PROBNP    Last 3 Glucose:     Recent Labs     10/08/20  0622 10/08/20  1600 10/09/20  0515   GLUCOSE 200* 258* 159*       Last 3 Coags:  Recent Labs     10/07/20  1340 10/09/20  0818   PROTIME 18.3* 28.0*   INR 1.6 2.4     Lab Results   Component Value Date    PROTIME 28.0 10/09/2020    INR 2.4 10/09/2020       Last 3 Lipid Panel:  No results for input(s): LDLCALC, HDL, TRIG in the last 72 hours. Invalid input(s): CHLPL  No results found for: LDLCALC  No results found for: HDL  No results found for: TRIG  No components found for: CHLPL    TSH:  No results for input(s): TSH in the last 72 hours. No results found for: TSH    ABGs:  Recent Labs     10/09/20  0558   PH 7.304*   PO2 88.7   PCO2 42.1   HCO3 20.4*   BE -5.6*   O2SAT 96.9       Lactic Acid:  Recent Labs     10/09/20  0515   LACTA 4.1*         Radiology:  RAD Results:  US DUP LOWER EXTREMITIES BILATERAL VENOUS   Final Result   No evidence of DVT in either lower extremity. US DUP UPPER EXTREMITY LEFT VENOUS   Preliminary Result   Occlusive thrombus left mid to proximal brachial vein. The findings were sent to the Radiology Results [address] at 11:31   am on 10/9/2020to be communicated to a licensed caregiver. XR CHEST PORTABLE   Preliminary Result   ET tube advancement. Right lower lobe atelectasis. XR CHEST PORTABLE   Final Result   No interval change         RADIOLOGY REPORT   Final Result      XR CHEST PORTABLE   Final Result   1. No interval change in the small left pleural effusion and left lung base   atelectasis. 2. No change in the minimal right lung base atelectasis.          XR CHEST PORTABLE   Final Result   Opacities are present in left lung base which appear to have increased in   could suggest increasing pneumonia, atelectasis, or effusion. XR CHEST ABDOMEN NG PLACEMENT   Final Result   Satisfactory position of nasogastric tube. XR ABDOMEN (KUB) (SINGLE AP VIEW)   Final Result   Transverse, descending, and sigmoid colon mural thickening may reflect edema   and/or inflammation/infection, without significant interval change. Slight prominence of gas throughout the small intestine, without gross   distension, suggestive of paralytic ileus. Persisting left lung base opacity may be atelectasis with pleural effusion. Differential again includes infectious infiltrate         XR ABDOMEN (KUB) (SINGLE AP VIEW)   Final Result   Thickening of the transverse colon wall. Consistent with inflammation   identified on prior CT. US GALLBLADDER RUQ   Final Result   Cholelithiasis with positive sonographic Hopson's sign but no wall thickening   or pericholecystic inflammatory changes, altogether equivocal for   cholecystitis. Consider functional analysis with nuclear medicine HIDA scan. Hepatic steatosis. 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 Suspected small right pleural effusion. CT ABDOMEN PELVIS W IV CONTRAST Additional Contrast? None   Final Result   Addendum 1 of 1   ADDENDUM:   In the title of the examination, disregard the CT abdomen and pelvis. Final      CT ABDOMEN PELVIS W IV CONTRAST Additional Contrast? None   Final Result   Diffuse inflammation of the transverse colon, descending and rectosigmoid   colon with wall thickening and edema with active contrast   extravasation/bleeding. Hemorrhagic diverticulitis or colitis are   considered. There is some edema in the presacral area and tiny amount of air   collection in the rectovesical pouch. Walled-off microperforation is   considered. Distended gallbladder. Atelectasis/pleural effusion in the left lung base.       RECOMMENDATIONS:   The clinical service was notified         XR CHEST PORTABLE   Final Result   Addendum 1 of 1   ADDENDUM:   In the title of the examination, disregard the CT abdomen and pelvis. Final      XR CHEST 1 VIEW   Final Result   Suspect left pleural effusion. No airspace consolidation. Dual-chamber   pacemaker in place. Follow-up PA and lateral radiographs may be helpful in   further evaluation. XR CHEST PORTABLE    (Results Pending)         EKG and Telemetry:  12-lead EKG personally reviewed and shows NSR, possible old inferior MI pattern, QT prolongation    Telemetry personally reviewed and shows atrial fibrillation rate 120-130's        ASSESSMENT / PLAN:    1. Atrial fibrillation after coming off sotalol and significant stressors. Rates presently reasonable in light of her stressors. Try to wean pressors as able, if sustains heart rates over 130 could try some digoxin 0.25 IV prn up to 1 mg in a day. Off anticoagulation for the past month.  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 Thank you for consultation.     Sherry Booth MD, [facility]  The [address] at [facility]    Electronically signed by Sherry Booth MD on 10/9/2020 at 1:31 PM

## 2020-10-09 NOTE — ANESTHESIA PRE PROCEDURE
Department of Anesthesiology  Preprocedure Note       Name:  Yon Reddy   Age:  82 y.o.  :  1935                                          MRN:  [id number]         Date:  10/10/2020      Surgeon: Marilyn Ambrocio):  Alexia Chapin MD    Procedure: Procedure(s):  EXPLORATORY LAPAROTOMY   +++CONTACT ISOLATION+++    Medications prior to admission:   Prior to Admission medications    Medication Sig Start Date End Date Taking?  Authorizing Provider   acetaminophen (TYLENOL) 325 MG tablet Take 650 mg by mouth every 6 hours as needed for Pain   Yes Historical Provider, MD   amitriptyline (ELAVIL) 10 MG tablet Take 10 mg by mouth nightly   Yes Historical Provider, MD   dicyclomine (BENTYL) 10 MG capsule Take 10 mg by mouth every 6 hours as needed   Yes Historical Provider, MD   calcium carbonate 600 MG TABS tablet Take 1 tablet by mouth daily   Yes Historical Provider, MD   ferrous sulfate (IRON 325) 325 (65 Fe) MG tablet Take 325 mg by mouth daily (with breakfast)   Yes Historical Provider, MD   lisinopril (PRINIVIL;ZESTRIL) 40 MG tablet Take 40 mg by mouth daily   Yes Historical Provider, MD   melatonin 3 MG TABS tablet Take 6 mg by mouth daily   Yes Historical Provider, MD   tamsulosin (FLOMAX) 0.4 MG capsule Take 0.4 mg by mouth nightly    Historical Provider, MD   loperamide (IMODIUM) 2 MG capsule Take 2 mg by mouth every 12 hours as needed for Diarrhea    Historical Provider, MD   mesalamine (PENTASA) 500 MG extended release capsule Take 500 mg by mouth 4 times daily    Historical Provider, MD   cefTRIAXone (ROCEPHIN) 1 g injection Infuse 1 g intravenously every 24 hours    Historical Provider, MD   sotalol (BETAPACE) 120 MG tablet Take 120 mg by mouth 2 times daily    Historical Provider, MD   ondansetron (ZOFRAN) 4 MG tablet Take 4 mg by mouth every 6 hours as needed for Nausea or Vomiting    Historical Provider, MD       Current medications:    Current Facility-Administered Medications   Medication Dose Route Frequency Provider Last Rate Last Dose    0.9 % sodium chloride bolus  20 mL Intravenous Once Barbara Daley DO        potassium phosphate 30 mmol in dextrose 5 % 250 mL IVPB  30 mmol Intravenous Once Gio Choi DO        0.9 % sodium chloride bolus  20 mL Intravenous Once Shelly Alonso MD        0.9 % sodium chloride bolus  20 mL Intravenous Once Peri De La Garza MD        PN-Adult  3-in-1 Central Line (Standard)   Intravenous Continuous TPN Peri De La Garza MD 75 mL/hr at 10/09/20 1801      pantoprazole (PROTONIX) injection 40 mg  40 mg Intravenous BID Vandy Burkitt, MD   40 mg at 10/09/20 2108    sodium chloride flush 0.9 % injection 10 mL  10 mL Intravenous BID Ulises Guerra MD   10 mL at 10/09/20 2108    vasopressin 20 Units in dextrose 5 % 100 mL infusion  0.02 Units/min Intravenous Continuous Vandy Burkitt, MD 6 mL/hr at 10/09/20 1450 0.02 Units/min at 10/09/20 1450    digoxin (LANOXIN) injection 250 mcg  250 mcg Intravenous Q2H PRN Haim Altman MD        piperacillin-tazobactam (ZOSYN) 3.375 g in dextrose 5 % 50 mL IVPB extended infusion (mini-bag)  3.375 g Intravenous Q8H Praful Herring MD   Stopped at 10/10/20 0700    0.9 % sodium chloride infusion   Intravenous Q8H Praful Herring MD 12.5 mL/hr at 10/09/20 2309      norepinephrine (LEVOPHED) 16 mg in dextrose 5 % 250 mL infusion  2 mcg/min Intravenous Continuous Peri De La Garza MD   Stopped at 10/09/20 1226    0.9% NaCl with KCl 20 mEq infusion   Intravenous Continuous Lorie Botello  mL/hr at 10/10/20 0056      fentaNYL 5 mcg/ml in 0.9%  ml infusion  25 mcg/hr Intravenous Continuous Owen Dorman MD 15 mL/hr at 10/10/20 0600 75 mcg/hr at 10/10/20 0600    propofol injection  10 mcg/kg/min Intravenous Continuous Owen Dorman MD        lubrifresh P.M. (artificial tears) ophthalmic ointment   Both Eyes PRN Owen Dorman MD        chlorhexidine (PERIDEX) 0.12 % solution 15 mL  15 mL QRS Duration  96  ms  Final  10/05/2020  6:11 PM  [facility]    Q-T Interval  456  ms  Final  10/05/2020  6:11 PM  [facility]    QTc Calculation (Bazett)  519  ms  Final  10/05/2020  6:11 PM  [facility]    P Axis  87  degrees  Final  10/05/2020  6:11 PM  [facility]    R Axis  -5  degrees  Final  10/05/2020  6:11 PM  [facility]    T Axis  -20  degrees  Final  10/05/2020  6:11 PM  [facility]    Testing Performed By     Lab - Abbreviation  Name  Director  Address  Valid Date Range    [facility]  [facility] MUSE  Unknown  Unknown  04/18/16 0721-Present    Narrative & Impression     Normal sinus rhythm  Inferior infarct , age undetermined  Prolonged QT  Nonspecific T wave abnormality  Abnormal ECG  No previous ECGs available     Chest Xray 10/9/2020: Impression    ET tube advancement.         Right lower lobe atelectasis. Anesthesia Evaluation  Patient summary reviewed and Nursing notes reviewed no history of anesthetic complications:   Airway: Mallampati: Unable to assess / NA  TM distance: <3 FB   Neck ROM: limited  Comment: intubated  Mouth opening: < 3 FB Dental:    (+) edentulous  Comment: Unable to assess- intubated    Pulmonary: breath sounds clear to auscultation                            ROS comment: Intubated and sedated  #7.5 ETT [email or web address]  AC 16  Vt 400  40%  P5   Cardiovascular:  Exercise tolerance: poor (<4 METS),   (+) hypertension:, pacemaker (dual chamber pacemaker 7/2016):, dysrhythmias: atrial fibrillation,       ECG reviewed  Rhythm: regular  Rate: normal                 ROS comment: h/o pericardial effusion 7/2016     Neuro/Psych:   Negative Neuro/Psych ROS               ROS comment: Unable to assess  GI/Hepatic/Renal:   (+) renal disease (QI): ARF,          ROS comment: Colitis   Abdominal pain  Diarrhea  c-diff  Status post total colectomy with abdominal washout and placement of wound VAC 10/7/2020.    Endo/Other:    (+) blood dyscrasia: anemia and thrombocytopenia:., .                  ROS comment: Septic shock- on levophed and vasopressin Abdominal:           Vascular: negative vascular ROS. Anesthesia Plan      general     ASA 4     (  NS + 20KCl @ 100 ml/hr  Fentanyl @ 75 mcg/hr  Left radial arterial line  Right IJ Central line  Will proceed with GA.--Pt is intubated and sedated.)  Induction: intravenous. arterial line and central line  MIPS: Postoperative opioids intended, Prophylactic antiemetics administered and Postoperative ventilation. Plan/risks discussed with: no family at the bedside     Blood Products Consent Comment: Patient has current blood band on   Plan discussed with attending and CRNA. Attending anesthesiologist reviewed and agrees with Pre Eval content      Patient to be re-evaluated by DOS Anesthesiologist      Ame Cason MD, [address] [place] [address]   10/10/2020    DOS STAFF ADDENDUM:    Pt seen and examined, physical exam updated, chart reviewed including anesthesia, drug and allergy history. H&P reviewed. No interval changes to history or physical examination (unless noted above). NPO status confirmed. Anesthetic plan, risks, benefits, alternatives discussed with patient. Patient verbalized an understanding and agrees to proceed.      Ame Cason MD  Staff Anesthesiologist  8:02 AM

## 2020-10-09 NOTE — PATIENT CARE CONFERENCE
..  Intensive Care Daily Quality Rounding Checklist      ICU Team Members: Dr. Sherryle Greenhouse, Jasen Toney (residents), charge nurse, bedside nurse, clinical pharmacist    ICU Day #: NUMBER: 3    Intubation Date: October 7    Ventilator Day #: NUMBER: 3    Central Line Insertion Date: October 4        Day #: NUMBER: 6     Arterial Line Insertion Date: October 7      Day #: NUMBER: 3    DVT Prophylaxis: pcd's    GI Prophylaxis: protonix    Martin Catheter Insertion Date: October 4       Day #: 6      Continued need (if yes, reason documented and discussed with physician): yes, accurate I & O    Skin Issues/ Wounds and ordered treatment discussed on rounds: Y- buttocks reddened, Wound vac to abd, SOS precautions    Goals/ Plans for the Day: Daily labs, wean vasopressers, bilateral  wrist restraints to prevent pulling tubes, Plan-return to OR this afternoon- family aware, give 2 FFP, recheck labs, to start TPN

## 2020-10-09 NOTE — ANESTHESIA PRE PROCEDURE
Department of Anesthesiology  Preprocedure Note       Name:  Francisca Brown   Age:  82 y.o.  :  1935                                          MRN:  [id number]         Date:  10/8/2020      Surgeon: Tish Brumfield):  Mary Jo Templeton MD    Procedure: Procedure(s):  EXPLORATORY LAPAROTOMY   +++CONTACT ISOLATION+++    Medications prior to admission:   Prior to Admission medications    Medication Sig Start Date End Date Taking? Authorizing Provider   acetaminophen (TYLENOL) 325 MG tablet Take 650 mg by mouth every 6 hours as needed for Pain    Historical Provider, MD   amitriptyline (ELAVIL) 10 MG tablet Take 10 mg by mouth nightly    Historical Provider, MD   dicyclomine (BENTYL) 10 MG capsule Take 10 mg by mouth every 6 hours as needed    Historical Provider, MD   calcium carbonate 600 MG TABS tablet Take 1 tablet by mouth daily    Historical Provider, MD   ferrous sulfate (IRON 325) 325 (65 Fe) MG tablet Take 325 mg by mouth daily (with breakfast)    Historical Provider, MD   tamsulosin (FLOMAX) 0.4 MG capsule Take 0.4 mg by mouth nightly    Historical Provider, MD   loperamide (IMODIUM) 2 MG capsule Take 2 mg by mouth every 12 hours as needed for Diarrhea    Historical Provider, MD   lisinopril (PRINIVIL;ZESTRIL) 40 MG tablet Take 40 mg by mouth daily    Historical Provider, MD   melatonin 3 MG TABS tablet Take 6 mg by mouth daily    Historical Provider, MD   mesalamine (PENTASA) 500 MG extended release capsule Take 500 mg by mouth 4 times daily    Historical Provider, MD   cefTRIAXone (ROCEPHIN) 1 g injection Infuse 1 g intravenously every 24 hours    Historical Provider, MD   sotalol (BETAPACE) 120 MG tablet Take 120 mg by mouth 2 times daily    Historical Provider, MD   ondansetron (ZOFRAN) 4 MG tablet Take 4 mg by mouth every 6 hours as needed for Nausea or Vomiting    Historical Provider, MD       Current medications:    No current facility-administered medications for this visit.       No current outpatient medications on file.      Facility-Administered Medications Ordered in Other Visits   Medication Dose Route Frequency Provider Last Rate Last Dose    sodium chloride flush 0.9 % injection 10 mL  10 mL Intravenous BID Vimal Clayton MD   10 mL at 10/08/20 0849    vasopressin 20 Units in dextrose 5 % 100 mL infusion  0.04 Units/min Intravenous Continuous Vimal Clayotn MD 12 mL/hr at 10/08/20 1927 0.04 Units/min at 10/08/20 1927    digoxin (LANOXIN) injection 250 mcg  250 mcg Intravenous Q2H PRN Jose Romero MD        piperacillin-tazobactam (ZOSYN) 3.375 g in dextrose 5 % 50 mL IVPB extended infusion (mini-bag)  3.375 g Intravenous Q8H Torito Simon MD 12.5 mL/hr at 10/08/20 1952 3.375 g at 10/08/20 1952    0.9 % sodium chloride infusion   Intravenous Q8H Torito Simon MD 12.5 mL/hr at 10/08/20 1520      norepinephrine (LEVOPHED) 16 mg in dextrose 5 % 250 mL infusion  2 mcg/min Intravenous Continuous Moni Foster MD 7.5 mL/hr at 10/08/20 1902 8 mcg/min at 10/08/20 1902    0.9% NaCl with KCl 20 mEq infusion   Intravenous Continuous Lorraine Bee  mL/hr at 10/08/20 1822      fentaNYL 5 mcg/ml in 0.9%  ml infusion  25 mcg/hr Intravenous Continuous Nikko Adan MD 35 mL/hr at 10/08/20 1837 175 mcg/hr at 10/08/20 1837    propofol injection  10 mcg/kg/min Intravenous Continuous Nikko Adan MD        lubrifresh P.M. (artificial tears) ophthalmic ointment   Both Eyes PRN Nikko Adan MD        chlorhexidine (PERIDEX) 0.12 % solution 15 mL  15 mL Mouth/Throat BID Nikko Adan MD   15 mL at 10/08/20 0827    phenylephrine (HUGO-SYNEPHRINE) 50 mg in dextrose 5 % 250 mL infusion  100 mcg/min Intravenous Continuous Vimal Clayton MD   Stopped at 10/08/20 1841    metronidazole (FLAGYL) 500 mg in NaCl 100 mL IVPB premix  500 mg Intravenous Q8H Nikko Adan  mL/hr at 10/08/20 1952 500 mg at 10/08/20 1952    pantoprazole (PROTONIX) injection 40 mg  40 mg Intravenous Daily Dayron Carrion MD   40 mg at 10/08/20 0827    [Held by provider] enoxaparin (LOVENOX) injection 30 mg  30 mg Subcutaneous Daily Dayron Carrion MD   30 mg at 10/07/20 0816       Allergies: Allergies   Allergen Reactions    Codeine        Problem List:    Patient Active Problem List   Diagnosis Code    Septic shock ([facility] A41.9, R65.21    Hyponatremia E87.1    Diarrhea R19.7    UTI (urinary tract infection) with pyuria N39.0    Anemia D64.9    Leukocytosis D72.829    Acidosis E87.2    Hypotension I95.9       Past Medical History:        Diagnosis Date    Anemia     Atrial fibrillation ([facility]     Colitis     Hypertension        Past Surgical History:        Procedure Laterality Date    SMALL INTESTINE SURGERY N/A 10/7/2020    TOTAL COLECTOMY WITH ABTHERA OPEN ABDOMINAL DRESSING performed by Quita Parada MD at [facility] OR       Social History:    Social History     Tobacco Use    Smoking status: Never Smoker    Smokeless tobacco: Never Used   Substance Use Topics    Alcohol use: Never     Frequency: Never                                Counseling given: Not Answered      Vital Signs (Current): There were no vitals filed for this visit.                                            BP Readings from Last 3 Encounters:   10/08/20 (!) 105/46   10/07/20 (!) 130/58       NPO Status:                                                                                 BMI:   Wt Readings from Last 3 Encounters:   10/07/20 208 lb 6.4 oz (94.5 kg)     There is no height or weight on file to calculate BMI.    CBC:   Lab Results   Component Value Date    WBC 52.4 10/08/2020    RBC 3.83 10/08/2020    HGB 9.3 10/08/2020    HCT 27.7 10/08/2020    MCV 90.3 10/08/2020    RDW 17.7 10/08/2020    PLT 51 10/08/2020       CMP:   Lab Results   Component Value Date     10/08/2020    K 3.3 10/08/2020    K 3.8 10/08/2020     10/08/2020    CO2 18 10/08/2020    BUN 29 10/08/2020    CREATININE 0.9 10/08/2020 GFRAA >60 10/08/2020    LABGLOM 60 10/08/2020    GLUCOSE 258 10/08/2020    PROT 3.8 10/08/2020    CALCIUM 6.2 10/08/2020    BILITOT 2.2 10/08/2020    ALKPHOS 105 10/08/2020    AST 23 10/08/2020    ALT 11 10/08/2020       POC Tests: No results for input(s): POCGLU, POCNA, POCK, POCCL, POCBUN, POCHEMO, POCHCT in the last 72 hours. Coags:   Lab Results   Component Value Date    PROTIME 18.3 10/07/2020    INR 1.6 10/07/2020    APTT 58.6 10/07/2020       HCG (If Applicable): No results found for: PREGTESTUR, PREGSERUM, HCG, HCGQUANT     ABGs:   Lab Results   Component Value Date    YYN8SDE 15.9 10/08/2020        Type & Screen (If Applicable):  No results found for: LABABO, LABRH    Drug/Infectious Status (If Applicable):  No results found for: HIV, HEPCAB    COVID-19 Screening (If Applicable):   Lab Results   Component Value Date    COVID19 Not Detected 10/04/2020         Anesthesia Evaluation  Patient summary reviewed and Nursing notes reviewed no history of anesthetic complications:   Airway: Mallampati: III  TM distance: <3 FB   Neck ROM: limited  Comment: intubated  Mouth opening: < 3 FB Dental:    (+) edentulous      Pulmonary: breath sounds clear to auscultation                            ROS comment: Intubated and sedated   Cardiovascular:    (+) hypertension:, dysrhythmias: atrial fibrillation,         Rhythm: regular                      Neuro/Psych:   Negative Neuro/Psych ROS              GI/Hepatic/Renal:   (+) renal disease (QI):,          ROS comment: Colitis   Abdominal pain  Diarrhea  c-diff  . Endo/Other:    (+) blood dyscrasia: anemia:., .                  ROS comment: Septic shock- on levophed and vasopressin Abdominal:           Vascular: negative vascular ROS. Anesthesia Plan      general     ASA 4       Induction: intravenous.   arterial line and central line  MIPS: Postoperative opioids intended, Prophylactic antiemetics administered and Postoperative ventilation.                   Patient to be re-evaluated by DOS Anesthesiologist      Delma Lyles MD   10/8/2020

## 2020-10-09 NOTE — PROGRESS NOTES
Post transfusion labs reviewed. After receiving 2 packs of platelets and 2 FFP her platelet count went from 18 only to 36, although her INR did correct to 1.6. She was also found to have a brachial vein thrombus. DIC and BOY labs are pending.      Will cancel OR for today and reschedule for tomorrow morning  Would continue platelet transfusion for goal > 75,000 prior to OR    Electronically signed by Madalyn Vasquez MD on 10/9/2020 at 2:57 PM

## 2020-10-09 NOTE — PROGRESS NOTES
Department of Internal Medicine  Nephrology Attending Progress Note      Reason for Consult:   QI  Requesting Physician:  Dr Soto Moses:  weakness    History Obtained From:  patient, family member - daughter    Sub: Patient seen and examined at bedside in the ICU  Events over the last 24 hours reviewed with the ICU RN, she is off of Levophed  Receiving 1 unit of PRBC, multiple units of platelets and FFP transfusion today  UO remains marginal. Abdominal drain out put is 1300 ml in 24 hours          Past Medical History:        Diagnosis Date    Anemia     Atrial fibrillation ([address]     Colitis     Hypertension        Current Medications:    Current Facility-Administered Medications: 0.9 % sodium chloride bolus, 20 mL, Intravenous, Once  0.9 % sodium chloride bolus, 20 mL, Intravenous, Once  PN-Adult  3-in-1 Central Line (Standard), , Intravenous, Continuous TPN  sodium chloride flush 0.9 % injection 10 mL, 10 mL, Intravenous, BID  vasopressin 20 Units in dextrose 5 % 100 mL infusion, 0.02 Units/min, Intravenous, Continuous  digoxin (LANOXIN) injection 250 mcg, 250 mcg, Intravenous, Q2H PRN  piperacillin-tazobactam (ZOSYN) 3.375 g in dextrose 5 % 50 mL IVPB extended infusion (mini-bag), 3.375 g, Intravenous, Q8H  0.9 % sodium chloride infusion, , Intravenous, Q8H  norepinephrine (LEVOPHED) 16 mg in dextrose 5 % 250 mL infusion, 2 mcg/min, Intravenous, Continuous  0.9% NaCl with KCl 20 mEq infusion, , Intravenous, Continuous  fentaNYL 5 mcg/ml in 0.9%  ml infusion, 25 mcg/hr, Intravenous, Continuous  propofol injection, 10 mcg/kg/min, Intravenous, Continuous  [DISCONTINUED] lubrifresh P.M. (artificial tears) ophthalmic ointment 0.5 inch, 1 applicator, Both Eyes, PRN **AND** lubrifresh P.M. (artificial tears) ophthalmic ointment, , Both Eyes, PRN  chlorhexidine (PERIDEX) 0.12 % solution 15 mL, 15 mL, Mouth/Throat, BID  metronidazole (FLAGYL) 500 mg in NaCl 100 mL IVPB premix, 500 mg, Intravenous, Q8H  pantoprazole (PROTONIX) injection 40 mg, 40 mg, Intravenous, Daily  [Held by provider] enoxaparin (LOVENOX) injection 30 mg, 30 mg, Subcutaneous, Daily  Allergies:  Codeine    Social History:    TOBACCO:  Never used tobacco  ETOH:  Never drank alcohol  DRUGS:  Never used recreational drugs    Family History:   History reviewed. No pertinent family history.     PHYSICAL EXAM:      Vitals:    VITALS:  BP (!) 118/49   Pulse 99   Temp 97.8 °F (36.6 °C) (Axillary)   Resp 16   Ht 5' 6\" (1.676 m)   Wt 212 lb 15.4 oz (96.6 kg)   SpO2 98%   BMI 34.37 kg/m²   24HR BLOOD PRESSURE RANGE:  Systolic (56GDC), AEQ:783 , Min:118 , WTW:236   ; Diastolic (68CVZ), GID:70, Min:49, Max:63    24HR INTAKE/OUTPUT:      Intake/Output Summary (Last 24 hours) at 10/9/2020 1647  Last data filed at 10/9/2020 1400  Gross per 24 hour   Intake 6229.33 ml   Output 1850 ml   Net 4379.33 ml       Constitutional:  Well developed and nourished , no acute distress, intubated and sedated, seen in the ICU, Fio2 at 40 %  HEENT:  NC, PERRL, oral mucosa dry , neck supple, no JVD  Respiratory:  Clear bilaterally except decreased BS at bases  Cardiovascular/Edema: RRR, s1,s2 no gallop  Gastrointestinal:  Wound vac in place, caraballo in place draining concentrated urine  Neurologic:  Alert and Ox 3 , nonfocal  Skin:  Decreased turgor  Other:  No rash    DATA:    CBC:   Lab Results   Component Value Date    WBC 42.3 10/09/2020    RBC 2.63 10/09/2020    HGB 7.9 10/09/2020    HCT 23.9 10/09/2020    MCV 90.9 10/09/2020    MCH 30.0 10/09/2020    MCHC 33.1 10/09/2020    RDW 18.8 10/09/2020    PLT 36 10/09/2020    MPV 10.1 10/09/2020     CMP:    Lab Results   Component Value Date     10/09/2020    K 3.8 10/09/2020    K 3.8 10/09/2020     10/09/2020    CO2 21 10/09/2020    BUN 29 10/09/2020    CREATININE 0.9 10/09/2020    GFRAA >60 10/09/2020    LABGLOM 60 10/09/2020    GLUCOSE 159 10/09/2020    PROT 3.3 10/09/2020    LABALBU 1.7 10/09/2020 CALCIUM 6.4 10/09/2020    BILITOT 1.7 10/09/2020    ALKPHOS 116 10/09/2020    AST 26 10/09/2020    ALT 11 10/09/2020     Magnesium:    Lab Results   Component Value Date    MG 1.7 10/09/2020     Phosphorus:    Lab Results   Component Value Date    PHOS 1.7 10/09/2020     Uric Acid:  No results found for: Margie Butts  U/A:    Lab Results   Component Value Date    COLORU CASEY 10/04/2020    PROTEINU TRACE 10/04/2020    PHUR 5.0 10/04/2020    WBCUA >20 10/04/2020    RBCUA NONE 10/04/2020    BACTERIA FEW 10/04/2020    CLARITYU TURBID 10/04/2020    SPECGRAV 1.025 10/04/2020    LEUKOCYTESUR MODERATE 10/04/2020    UROBILINOGEN 0.2 10/04/2020    BILIRUBINUR MODERATE 10/04/2020    BLOODU Negative 10/04/2020    GLUCOSEU Negative 10/04/2020     Radiology Review:    Atelectasis and pleural effusion in the lung bases more on the left side.         Right IJ central line has noted without complications.           Problems resolved:       IMPRESSION/RECOMMENDATIONS:      Mahendra Sage is a 82 y.o. female with significant past medical history of Colitis, diarrhea, blood in stool, A-Fib, anemia, and HTN admitted via ED for hypotension and diarrhea. Pt with SBP in 80's in ED, she was treated with IV fluids. Pt has been having diarrhea for > 3 months stating stools are dark with red blood associated nausea, vomiting, and poor oral intake. Daughter reports that patient was discharged from [facility] and was living with her. Patient got progressively worse therefore she brought her to ED. Initial labs done showed serum sodium 126 and creatinine of 1.3 mg/dl yesterday which worsened to 122 and 1.6 mg/dl respectively therefore this consultation was ordered. 1. QI secondary to volume responsive prerenal state versus ischemic ATN    2. Hyponatremia probably secondary to GI losses  3. C. Difficile colitis, with evidence of fulminant colitis with multiple perforations, s/p total colectomy  4.  High anion gap metabolic acidosis secondary to lactic acidosis with severe hypotension, hypoperfusion, PH>7.2  5. Anemia : on Ferrous sulfate,   6. Severe hypoalbuminemia/malnutrition  7. Hypocalcemia: Corrected calcium for hypoalbuminemia is 7.7mg/dL  8. Nutrition:NPO      PLAN:    · QI, oliguric secondary to severe ischemic ATN from profound hypotension, C/W IVF, pressor support. Continue to monitor the renal function and UO closely.   · Bicarb drip is discontinued, Started NS with 20 meq of KCL at 100 ml/hr  · 2 gm of IV calcium gluconate  · 2 gm IV magnesium sulphate  · Antibiotics per ID    Discussed with her daughter present at bedside      Tatyana Distance, MD

## 2020-10-09 NOTE — PROGRESS NOTES
edema. Lines: Right IJ TLC 10/4/2020. Right midline 10/7/2020. Left radial arterial line 10/7/2020. Martin catheter with clear urine. Laboratory and Tests Review:  Lab Results   Component Value Date    WBC 54.0 (H) 10/09/2020    WBC 52.4 (H) 10/08/2020    WBC 33.2 (H) 10/07/2020    HGB 10.1 (L) 10/09/2020    HCT 30.4 (L) 10/09/2020    MCV 90.5 10/09/2020    PLT 18 (LL) 10/09/2020     Lab Results   Component Value Date    NEUTROABS 50.22 (H) 10/09/2020    NEUTROABS 48.21 (H) 10/08/2020    NEUTROABS 19.26 (H) 10/07/2020     No results found for: [facility]  Lab Results   Component Value Date    ALT 11 10/09/2020    AST 26 10/09/2020    ALKPHOS 116 (H) 10/09/2020    BILITOT 1.7 (H) 10/09/2020     Lab Results   Component Value Date     10/09/2020    K 3.8 10/09/2020    K 3.8 10/09/2020     10/09/2020    CO2 21 10/09/2020    BUN 29 10/09/2020    CREATININE 0.9 10/09/2020    CREATININE 0.9 10/08/2020    CREATININE 1.0 10/08/2020    GFRAA >60 10/09/2020    LABGLOM 60 10/09/2020    GLUCOSE 159 10/09/2020    PROT 3.3 10/09/2020    LABALBU 1.7 10/09/2020    CALCIUM 6.4 10/09/2020    BILITOT 1.7 10/09/2020    ALKPHOS 116 10/09/2020    AST 26 10/09/2020    ALT 11 10/09/2020     Lab Results   Component Value Date    CRP 14.3 (H) 10/06/2020    CRP 17.5 (H) 10/05/2020     Lab Results   Component Value Date    SEDRATE 6 10/06/2020    SEDRATE 10 10/05/2020     Radiology:  Chest x-ray reviewed    Microbiology:   Nares screen for MRSA: Negative  Urine culture 10/4/2020 <10,000 E. coli, Corynebacterium, yeast  Blood cultures 10/4/2020: Negative so far  Stools for C. difficile: Positive  Peritoneal fluid culture 10/7/2020: Negative so far    No results for input(s): PROCAL in the last 72 hours. ASSESSMENT:  · Severe C. difficile infection with colitis  · Fulminant colitis.   Status post total colectomy with abdominal washout and placement of wound VAC 10/7/2020  · Peritonitis associated to the above  · Leukemoid reaction

## 2020-10-09 NOTE — CARE COORDINATION
COVID negative 10/4. Vent/ intubated since 10/7. POD # 2  Colectomy/wound vac. Requiring 2 iv pressors. Limited code status. Plan to return to OR when stable for ileostomy. From Abisai De La Torre PTA- will need precert to return. Await PT/OT evals when appropriate. Spoke w/ daughter Jasmeet Mode choices given although has [facility] [employer] [facility]- [facility] [address] [facility] notified to follow. N-17 initiated, Alexys Hurd form on chart per SRUTHI. Nicolas Martinez     Per Elisabeth @ [facility], they are out of network w/ [employer]  Spoke w/ daughter Adan Dimas- agreeable to have [facility] follow- Yuliana Vincent @ [facility] notified of referral to follow Nicolas Martinez

## 2020-10-09 NOTE — PLAN OF CARE
Problem: Skin Integrity:  Goal: Will show no infection signs and symptoms  Description: Will show no infection signs and symptoms  Outcome: Met This Shift     Problem: Falls - Risk of:  Goal: Will remain free from falls  Description: Will remain free from falls  Outcome: Met This Shift     Problem: Pain:  Goal: Pain level will decrease  Description: Pain level will decrease  Outcome: Ongoing     Problem: Inadequate oral food/beverage intake (NI-2.1)  Goal: Food and/or Nutrient Delivery  Description: Individualized approach for food/nutrient provision.   10/9/2020 1058 by Carina Retana RD, CNSC, LD  Outcome: Ongoing     Problem: Diarrhea:  Goal: Bowel elimination is within specified parameters  Description: Bowel elimination is within specified parameters  Outcome: Not Met This Shift  Goal: Establishment of normal bowel function will improve to within specified parameters  Description: Establishment of normal bowel function will improve to within specified parameters  Outcome: Not Met This Shift     Problem: Restraint Use - Nonviolent/Non-Self-Destructive Behavior:  Goal: Absence of restraint indications  Description: Absence of restraint indications  Outcome: Not Met This Shift  Goal: Absence of restraint-related injury  Description: Absence of restraint-related injury  Outcome: Not Met This Shift

## 2020-10-09 NOTE — PROGRESS NOTES
Priti Cornejo Hospitalist   Progress Note    Admitting Date and Time: 10/4/2020  4:26 PM  Admit Dx: Septic shock ([address] [A41.9, R65.21]  Septic shock ([address] [A41.9, R65.21]    Subjective:    Patient was admitted with Septic shock ([address] [A41.9, R65.21]  Septic shock ([address] [A41.9, R65.21]. Patient sedated and intubated.  sodium chloride  20 mL Intravenous Once    sodium chloride  20 mL Intravenous Once    pantoprazole  40 mg Intravenous BID    sodium chloride flush  10 mL Intravenous BID    piperacillin-tazobactam  3.375 g Intravenous Q8H    chlorhexidine  15 mL Mouth/Throat BID    metroNIDAZOLE  500 mg Intravenous Q8H    [Held by provider] enoxaparin  30 mg Subcutaneous Daily     digoxin, 250 mcg, Q2H PRN  artificial tears, , PRN         Objective:          PHYSICAL EXAM:    Vitals:  BP (!) 118/49   Pulse 99   Temp 97.8 °F (36.6 °C) (Axillary)   Resp 16   Ht 5' 6\" (1.676 m)   Wt 212 lb 15.4 oz (96.6 kg)   SpO2 98%   BMI 34.37 kg/m²     General:  Appears comfortable. Pt sedated and intubated  HEENT:  Mucous membranes moist. No erythema, rhinorrhea, or post-nasal drip noted. Neck:  No carotid bruits. Heart:  Rhythm regular at rate of 98  Lungs:  CTA. No wheeze, rales, or rhonchi  Abdomen:  Positive bowel sounds positive. Soft. Non-tender. No guarding, rebound or rigidity. Breast/Rectal/Genitourinary: not pertinent. Extremities:  positive for 1+ b/l lower extremity edema  Skin:  Warm and dry  Vascular: 2/4 Dorsalis Pedis pulses bilaterally.   Neuro:  Limited due to pt's status                Recent Labs     10/08/20  0622 10/08/20  1600 10/09/20  0515    136 140   K 3.8 3.3* 3.8  3.8   * 108* 110*   CO2 18* 18* 21*   BUN 32* 29* 29*   CREATININE 1.0 0.9 0.9   GLUCOSE 200* 258* 159*   CALCIUM 7.1* 6.2* 6.4*       Recent Labs     10/09/20  0515 10/09/20  1310 10/09/20  1745   WBC 54.0* 42.3* 35.3*   RBC 3.36* 2.63* 2.70*   HGB 10.1* 7.9* 8.1*   HCT 30.4* 23.9* 24.5* MCV 90.5 90.9 90.7   MCH 30.1 30.0 30.0   MCHC 33.2 33.1 33.1   RDW 18.6* 18.8* 19.1*   PLT 18* 36* 19*   MPV 11.4 10.1 10.4       CBC with Differential:    Lab Results   Component Value Date    WBC 35.3 10/09/2020    RBC 2.70 10/09/2020    HGB 8.1 10/09/2020    HCT 24.5 10/09/2020    PLT 19 10/09/2020    MCV 90.7 10/09/2020    MCH 30.0 10/09/2020    MCHC 33.1 10/09/2020    RDW 19.1 10/09/2020    NRBC 1.0 10/09/2020    METASPCT 1.0 10/09/2020    LYMPHOPCT 3.0 10/09/2020    PROMYELOPCT 3.0 10/08/2020    MONOPCT 9.0 10/09/2020    MYELOPCT 4.0 10/09/2020    BASOPCT 0.0 10/09/2020    MONOSABS 3.18 10/09/2020    LYMPHSABS 1.06 10/09/2020    EOSABS 0.00 10/09/2020    BASOSABS 0.00 10/09/2020     CMP:    Lab Results   Component Value Date     10/09/2020    K 3.8 10/09/2020    K 3.8 10/09/2020     10/09/2020    CO2 21 10/09/2020    BUN 29 10/09/2020    CREATININE 0.9 10/09/2020    GFRAA >60 10/09/2020    LABGLOM 60 10/09/2020    GLUCOSE 159 10/09/2020    PROT 3.3 10/09/2020    LABALBU 1.7 10/09/2020    CALCIUM 6.4 10/09/2020    BILITOT 1.7 10/09/2020    ALKPHOS 116 10/09/2020    AST 26 10/09/2020    ALT 11 10/09/2020     Ionized Calcium:  No results found for: IONCA  Magnesium:    Lab Results   Component Value Date    MG 1.7 10/09/2020     Phosphorus:    Lab Results   Component Value Date    PHOS 1.7 10/09/2020     PT/INR:    Lab Results   Component Value Date    PROTIME 18.4 10/09/2020    INR 1.6 10/09/2020     PTT:    Lab Results   Component Value Date    APTT 76.7 10/09/2020   [APTT}  ABG:    Lab Results   Component Value Date    PH 7.304 10/09/2020    PCO2 42.1 10/09/2020    PO2 88.7 10/09/2020    HCO3 20.4 10/09/2020    BE -5.6 10/09/2020    O2SAT 96.9 10/09/2020        Radiology:   US DUP LOWER EXTREMITIES BILATERAL VENOUS   Final Result   No evidence of DVT in either lower extremity. US DUP UPPER EXTREMITY LEFT VENOUS   Final Result   Occlusive thrombus left mid to proximal brachial vein. The findings were sent to the Radiology Results [address] at 11:31   am on 10/9/2020to be communicated to a licensed caregiver. XR CHEST PORTABLE   Final Result   ET tube advancement. Right lower lobe atelectasis. XR CHEST PORTABLE   Final Result   No interval change         RADIOLOGY REPORT   Final Result      XR CHEST PORTABLE   Final Result   1. No interval change in the small left pleural effusion and left lung base   atelectasis. 2. No change in the minimal right lung base atelectasis. XR CHEST PORTABLE   Final Result   Opacities are present in left lung base which appear to have increased in   could suggest increasing pneumonia, atelectasis, or effusion. XR CHEST ABDOMEN NG PLACEMENT   Final Result   Satisfactory position of nasogastric tube. XR ABDOMEN (KUB) (SINGLE AP VIEW)   Final Result   Transverse, descending, and sigmoid colon mural thickening may reflect edema   and/or inflammation/infection, without significant interval change. Slight prominence of gas throughout the small intestine, without gross   distension, suggestive of paralytic ileus. Persisting left lung base opacity may be atelectasis with pleural effusion. Differential again includes infectious infiltrate         XR ABDOMEN (KUB) (SINGLE AP VIEW)   Final Result   Thickening of the transverse colon wall. Consistent with inflammation   identified on prior CT. US GALLBLADDER RUQ   Final Result   Cholelithiasis with positive sonographic Hopson's sign but no wall thickening   or pericholecystic inflammatory changes, altogether equivocal for   cholecystitis. Consider functional analysis with nuclear medicine HIDA scan. Hepatic steatosis. 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 Suspected small right pleural effusion.          CT ABDOMEN PELVIS W IV CONTRAST Additional Contrast? None   Final Result   Addendum 1 of 1   ADDENDUM:   In the title of the examination, disregard the CT abdomen and pelvis. Final      CT ABDOMEN PELVIS W IV CONTRAST Additional Contrast? None   Final Result   Diffuse inflammation of the transverse colon, descending and rectosigmoid   colon with wall thickening and edema with active contrast   extravasation/bleeding. Hemorrhagic diverticulitis or colitis are   considered. There is some edema in the presacral area and tiny amount of air   collection in the rectovesical pouch. Walled-off microperforation is   considered. Distended gallbladder. Atelectasis/pleural effusion in the left lung base. RECOMMENDATIONS:   The clinical service was notified         XR CHEST PORTABLE   Final Result   Addendum 1 of 1   ADDENDUM:   In the title of the examination, disregard the CT abdomen and pelvis. Final      XR CHEST 1 VIEW   Final Result   Suspect left pleural effusion. No airspace consolidation. Dual-chamber   pacemaker in place. Follow-up PA and lateral radiographs may be helpful in   further evaluation. XR CHEST PORTABLE    (Results Pending)       Assessment:    Active Problems:    Septic shock (HCC)    Hyponatremia    Diarrhea    UTI (urinary tract infection) with pyuria    Anemia    Leukocytosis    Acidosis    Hypotension    Sick sinus syndrome (HCC)    Cardiac pacemaker in situ  Resolved Problems:    * No resolved hospital problems. *      Plan:  1. Hypotension (septic shock vs hypovolemia)  intensivist following. Pressors per intensivist.   2. sepsis(leukocytosis, RR noted to go up to 21 and 33, infection)POA supportive care and tx underlying infection  3. uti possibly due to catheter treatment started as outpt and continued here for now. ID following. 4. C. Diff colitis, fuminant/toxic megacolon  abx per ID   Surgery following. Gi following. Pt s/p total colectomy.  Pt to return to OR for end ileostomy creation  5. UC less likely   bx reviewed and appears to be idiopathic colitis on bx. D/w gi. Pseudomembranous colitis likely and recommended surgical consult with notation of microperforation. Gi following peripherally  6. Hyponatremia improved monitor  7. mary renal following  Fluids per renal. improving  8.  leukocytosis possibly multifactorial monitor closely  9. Atelectasis contributing to leukocytosis  monitor  10. Anemia likely with acute blood loss component monitor and transfuse prn  11. Acidosis/lactic acidosis monitor  12. Hypoalbuminemia edema likely due to third spacing  13. Pleural effusion monitor low albumin may also be contributing  14. Atrial fib monitor and tx. Cardiology on consult  15. htn stop lisinopril  Monitor bp  16. Hypokalemia monitor and replace prn  17. Hypophosphatemia monitor and replace prn  18.  Thrombocytopenia monitor    Chart reviewed and updated by nursing    Time spent is 35 min    Electronically signed by Betzy Epstein DO on 10/9/2020 at 6:47 PM

## 2020-10-10 ENCOUNTER — APPOINTMENT (OUTPATIENT)
Dept: GENERAL RADIOLOGY | Age: 85
DRG: 853 | End: 2020-10-10
Payer: MEDICARE

## 2020-10-10 ENCOUNTER — ANESTHESIA (OUTPATIENT)
Dept: OPERATING ROOM | Age: 85
DRG: 853 | End: 2020-10-10
Payer: MEDICARE

## 2020-10-10 VITALS — SYSTOLIC BLOOD PRESSURE: 131 MMHG | TEMPERATURE: 95.4 F | DIASTOLIC BLOOD PRESSURE: 80 MMHG | OXYGEN SATURATION: 96 %

## 2020-10-10 LAB
AADO2: 124.5 MMHG
ALBUMIN SERPL-MCNC: 2.3 G/DL (ref 3.5–5.2)
ALP BLD-CCNC: 175 U/L (ref 35–104)
ALT SERPL-CCNC: 13 U/L (ref 0–32)
ANION GAP SERPL CALCULATED.3IONS-SCNC: 3 MMOL/L (ref 7–16)
ANISOCYTOSIS: ABNORMAL
APTT: 35.4 SEC (ref 24.5–35.1)
AST SERPL-CCNC: 27 U/L (ref 0–31)
B.E.: -2.9 MMOL/L (ref -3–3)
BASOPHILIC STIPPLING: ABNORMAL
BASOPHILS ABSOLUTE: 0 E9/L (ref 0–0.2)
BASOPHILS ABSOLUTE: 0 E9/L (ref 0–0.2)
BASOPHILS ABSOLUTE: 0.16 E9/L (ref 0–0.2)
BASOPHILS RELATIVE PERCENT: 0 % (ref 0–2)
BASOPHILS RELATIVE PERCENT: 0 % (ref 0–2)
BASOPHILS RELATIVE PERCENT: 0.5 % (ref 0–2)
BILIRUB SERPL-MCNC: 1.6 MG/DL (ref 0–1.2)
BLOOD BANK DISPENSE STATUS: NORMAL
BLOOD BANK PRODUCT CODE: NORMAL
BPU ID: NORMAL
BUN BLDV-MCNC: 27 MG/DL (ref 8–23)
BURR CELLS: ABNORMAL
CALCIUM IONIZED: 1.11 MMOL/L (ref 1.15–1.33)
CALCIUM SERPL-MCNC: 7 MG/DL (ref 8.6–10.2)
CHLORIDE BLD-SCNC: 109 MMOL/L (ref 98–107)
CO2: 26 MMOL/L (ref 22–29)
COHB: 0.4 % (ref 0–1.5)
CREAT SERPL-MCNC: 0.7 MG/DL (ref 0.5–1)
CRITICAL: ABNORMAL
DATE ANALYZED: ABNORMAL
DATE OF COLLECTION: ABNORMAL
DESCRIPTION BLOOD BANK: NORMAL
DOHLE BODIES: ABNORMAL
EOSINOPHILS ABSOLUTE: 0 E9/L (ref 0.05–0.5)
EOSINOPHILS ABSOLUTE: 0 E9/L (ref 0.05–0.5)
EOSINOPHILS ABSOLUTE: 0.01 E9/L (ref 0.05–0.5)
EOSINOPHILS RELATIVE PERCENT: 0 % (ref 0–6)
FIO2: 40 %
GFR AFRICAN AMERICAN: >60
GFR NON-AFRICAN AMERICAN: >60 ML/MIN/1.73
GLUCOSE BLD-MCNC: 174 MG/DL (ref 74–99)
HCO3: 23.4 MMOL/L (ref 22–26)
HCT VFR BLD CALC: 23.9 % (ref 34–48)
HCT VFR BLD CALC: 23.9 % (ref 34–48)
HCT VFR BLD CALC: 24 % (ref 34–48)
HEMOGLOBIN: 7.6 G/DL (ref 11.5–15.5)
HEMOGLOBIN: 7.7 G/DL (ref 11.5–15.5)
HEMOGLOBIN: 7.8 G/DL (ref 11.5–15.5)
HHB: 3.2 % (ref 0–5)
HYPOCHROMIA: ABNORMAL
IMMATURE GRANULOCYTES #: 4.85 E9/L
IMMATURE GRANULOCYTES %: 16.2 % (ref 0–5)
INR BLD: 1.2
LAB: ABNORMAL
LACTIC ACID: 1.7 MMOL/L (ref 0.5–2.2)
LACTIC ACID: 1.9 MMOL/L (ref 0.5–2.2)
LACTIC ACID: 2.2 MMOL/L (ref 0.5–2.2)
LACTIC ACID: 2.4 MMOL/L (ref 0.5–2.2)
LYMPHOCYTES ABSOLUTE: 0.85 E9/L (ref 1.5–4)
LYMPHOCYTES ABSOLUTE: 0.89 E9/L (ref 1.5–4)
LYMPHOCYTES ABSOLUTE: 1.21 E9/L (ref 1.5–4)
LYMPHOCYTES RELATIVE PERCENT: 2.6 % (ref 20–42)
LYMPHOCYTES RELATIVE PERCENT: 4 % (ref 20–42)
LYMPHOCYTES RELATIVE PERCENT: 4.1 % (ref 20–42)
Lab: ABNORMAL
MAGNESIUM: 2 MG/DL (ref 1.6–2.6)
MCH RBC QN AUTO: 29.5 PG (ref 26–35)
MCH RBC QN AUTO: 29.6 PG (ref 26–35)
MCH RBC QN AUTO: 29.8 PG (ref 26–35)
MCHC RBC AUTO-ENTMCNC: 31.8 % (ref 32–34.5)
MCHC RBC AUTO-ENTMCNC: 32.1 % (ref 32–34.5)
MCHC RBC AUTO-ENTMCNC: 32.6 % (ref 32–34.5)
MCV RBC AUTO: 91.2 FL (ref 80–99.9)
MCV RBC AUTO: 92.3 FL (ref 80–99.9)
MCV RBC AUTO: 92.6 FL (ref 80–99.9)
METAMYELOCYTES RELATIVE PERCENT: 2 % (ref 0–1)
METAMYELOCYTES RELATIVE PERCENT: 4.3 % (ref 0–1)
METER GLUCOSE: 160 MG/DL (ref 74–99)
METER GLUCOSE: 245 MG/DL (ref 74–99)
METHB: 0.2 % (ref 0–1.5)
MODE: AC
MONOCYTES ABSOLUTE: 0.42 E9/L (ref 0.1–0.95)
MONOCYTES ABSOLUTE: 0.74 E9/L (ref 0.1–0.95)
MONOCYTES ABSOLUTE: 1.19 E9/L (ref 0.1–0.95)
MONOCYTES RELATIVE PERCENT: 2 % (ref 2–12)
MONOCYTES RELATIVE PERCENT: 2.5 % (ref 2–12)
MONOCYTES RELATIVE PERCENT: 3.5 % (ref 2–12)
MYELOCYTE PERCENT: 1.7 % (ref 0–0)
MYELOCYTE PERCENT: 4 % (ref 0–0)
NEUTROPHILS ABSOLUTE: 19.72 E9/L (ref 1.8–7.3)
NEUTROPHILS ABSOLUTE: 22.9 E9/L (ref 1.8–7.3)
NEUTROPHILS ABSOLUTE: 28.01 E9/L (ref 1.8–7.3)
NEUTROPHILS RELATIVE PERCENT: 76.7 % (ref 43–80)
NEUTROPHILS RELATIVE PERCENT: 87 % (ref 43–80)
NEUTROPHILS RELATIVE PERCENT: 87.9 % (ref 43–80)
NUCLEATED RED BLOOD CELLS: 0.9 /100 WBC
NUCLEATED RED BLOOD CELLS: 2 /100 WBC
O2 CONTENT: 12.6 ML/DL
O2 SATURATION: 96.8 % (ref 92–98.5)
O2HB: 96.2 % (ref 94–97)
OPERATOR ID: ABNORMAL
OVALOCYTES: ABNORMAL
OVALOCYTES: ABNORMAL
PATIENT TEMP: 37 C
PCO2: 47.8 MMHG (ref 35–45)
PDW BLD-RTO: 19.2 FL (ref 11.5–15)
PDW BLD-RTO: 19.4 FL (ref 11.5–15)
PDW BLD-RTO: 19.6 FL (ref 11.5–15)
PEEP/CPAP: 5 CMH2O
PFO2: 2.39 MMHG/%
PH BLOOD GAS: 7.31 (ref 7.35–7.45)
PHOSPHORUS: 1.3 MG/DL (ref 2.5–4.5)
PLATELET # BLD: 29 E9/L (ref 130–450)
PLATELET # BLD: 41 E9/L (ref 130–450)
PLATELET # BLD: 45 E9/L (ref 130–450)
PLATELET CONFIRMATION: NORMAL
PMV BLD AUTO: 10.2 FL (ref 7–12)
PMV BLD AUTO: 10.5 FL (ref 7–12)
PMV BLD AUTO: 9.7 FL (ref 7–12)
PO2: 95.7 MMHG (ref 75–100)
POIKILOCYTES: ABNORMAL
POIKILOCYTES: ABNORMAL
POLYCHROMASIA: ABNORMAL
POLYCHROMASIA: ABNORMAL
POTASSIUM SERPL-SCNC: 4 MMOL/L (ref 3.5–5)
PROMYELOCYTES PERCENT: 1 % (ref 0–0)
PROTHROMBIN TIME: 13.7 SEC (ref 9.3–12.4)
RBC # BLD: 2.58 E12/L (ref 3.5–5.5)
RBC # BLD: 2.6 E12/L (ref 3.5–5.5)
RBC # BLD: 2.62 E12/L (ref 3.5–5.5)
RI(T): 130 %
ROULEAUX: ABNORMAL
RR MECHANICAL: 18 B/MIN
SODIUM BLD-SCNC: 138 MMOL/L (ref 132–146)
SOURCE, BLOOD GAS: ABNORMAL
TARGET CELLS: ABNORMAL
TARGET CELLS: ABNORMAL
TEAR DROP CELLS: ABNORMAL
THB: 9.2 G/DL (ref 11.5–16.5)
TIME ANALYZED: 527
TOTAL PROTEIN: 4.2 G/DL (ref 6.4–8.3)
TOXIC GRANULATION: ABNORMAL
TOXIC GRANULATION: ABNORMAL
TRIGL SERPL-MCNC: 123 MG/DL (ref 0–149)
VT MECHANICAL: 400 ML
WBC # BLD: 21.2 E9/L (ref 4.5–11.5)
WBC # BLD: 29.8 E9/L (ref 4.5–11.5)
WBC # BLD: 29.9 E9/L (ref 4.5–11.5)

## 2020-10-10 PROCEDURE — 2580000003 HC RX 258: Performed by: NURSE ANESTHETIST, CERTIFIED REGISTERED

## 2020-10-10 PROCEDURE — 84478 ASSAY OF TRIGLYCERIDES: CPT

## 2020-10-10 PROCEDURE — 82805 BLOOD GASES W/O2 SATURATION: CPT

## 2020-10-10 PROCEDURE — 2000000000 HC ICU R&B

## 2020-10-10 PROCEDURE — 2500000003 HC RX 250 WO HCPCS: Performed by: STUDENT IN AN ORGANIZED HEALTH CARE EDUCATION/TRAINING PROGRAM

## 2020-10-10 PROCEDURE — 94003 VENT MGMT INPAT SUBQ DAY: CPT

## 2020-10-10 PROCEDURE — 6370000000 HC RX 637 (ALT 250 FOR IP): Performed by: STUDENT IN AN ORGANIZED HEALTH CARE EDUCATION/TRAINING PROGRAM

## 2020-10-10 PROCEDURE — 97606 NEG PRS WND THER DME>50 SQCM: CPT | Performed by: SURGERY

## 2020-10-10 PROCEDURE — C9113 INJ PANTOPRAZOLE SODIUM, VIA: HCPCS | Performed by: INTERNAL MEDICINE

## 2020-10-10 PROCEDURE — 49002 REOPENING OF ABDOMEN: CPT | Performed by: SURGERY

## 2020-10-10 PROCEDURE — 83735 ASSAY OF MAGNESIUM: CPT

## 2020-10-10 PROCEDURE — 3600000003 HC SURGERY LEVEL 3 BASE: Performed by: SURGERY

## 2020-10-10 PROCEDURE — 99233 SBSQ HOSP IP/OBS HIGH 50: CPT | Performed by: INTERNAL MEDICINE

## 2020-10-10 PROCEDURE — 85025 COMPLETE CBC W/AUTO DIFF WBC: CPT

## 2020-10-10 PROCEDURE — 82330 ASSAY OF CALCIUM: CPT

## 2020-10-10 PROCEDURE — 2580000003 HC RX 258: Performed by: INTERNAL MEDICINE

## 2020-10-10 PROCEDURE — 37799 UNLISTED PX VASCULAR SURGERY: CPT

## 2020-10-10 PROCEDURE — 36430 TRANSFUSION BLD/BLD COMPNT: CPT

## 2020-10-10 PROCEDURE — 80053 COMPREHEN METABOLIC PANEL: CPT

## 2020-10-10 PROCEDURE — 6360000002 HC RX W HCPCS: Performed by: SPECIALIST

## 2020-10-10 PROCEDURE — 2500000003 HC RX 250 WO HCPCS: Performed by: SURGERY

## 2020-10-10 PROCEDURE — 2580000003 HC RX 258: Performed by: STUDENT IN AN ORGANIZED HEALTH CARE EDUCATION/TRAINING PROGRAM

## 2020-10-10 PROCEDURE — 2500000003 HC RX 250 WO HCPCS: Performed by: INTERNAL MEDICINE

## 2020-10-10 PROCEDURE — 36415 COLL VENOUS BLD VENIPUNCTURE: CPT

## 2020-10-10 PROCEDURE — 44125 REMOVAL OF SMALL INTESTINE: CPT | Performed by: SURGERY

## 2020-10-10 PROCEDURE — 6360000002 HC RX W HCPCS: Performed by: INTERNAL MEDICINE

## 2020-10-10 PROCEDURE — 2500000003 HC RX 250 WO HCPCS: Performed by: NURSE ANESTHETIST, CERTIFIED REGISTERED

## 2020-10-10 PROCEDURE — 83605 ASSAY OF LACTIC ACID: CPT

## 2020-10-10 PROCEDURE — 2580000003 HC RX 258: Performed by: SPECIALIST

## 2020-10-10 PROCEDURE — 0D1B0Z4 BYPASS ILEUM TO CUTANEOUS, OPEN APPROACH: ICD-10-PCS | Performed by: SURGERY

## 2020-10-10 PROCEDURE — 85610 PROTHROMBIN TIME: CPT

## 2020-10-10 PROCEDURE — 82962 GLUCOSE BLOOD TEST: CPT

## 2020-10-10 PROCEDURE — 3700000001 HC ADD 15 MINUTES (ANESTHESIA): Performed by: SURGERY

## 2020-10-10 PROCEDURE — 2709999900 HC NON-CHARGEABLE SUPPLY: Performed by: SURGERY

## 2020-10-10 PROCEDURE — 3600000013 HC SURGERY LEVEL 3 ADDTL 15MIN: Performed by: SURGERY

## 2020-10-10 PROCEDURE — 85730 THROMBOPLASTIN TIME PARTIAL: CPT

## 2020-10-10 PROCEDURE — 2720000010 HC SURG SUPPLY STERILE: Performed by: SURGERY

## 2020-10-10 PROCEDURE — 71045 X-RAY EXAM CHEST 1 VIEW: CPT

## 2020-10-10 PROCEDURE — 0DBB0ZZ EXCISION OF ILEUM, OPEN APPROACH: ICD-10-PCS | Performed by: SURGERY

## 2020-10-10 PROCEDURE — 84100 ASSAY OF PHOSPHORUS: CPT

## 2020-10-10 PROCEDURE — 3700000000 HC ANESTHESIA ATTENDED CARE: Performed by: SURGERY

## 2020-10-10 PROCEDURE — 88307 TISSUE EXAM BY PATHOLOGIST: CPT

## 2020-10-10 RX ORDER — ROCURONIUM BROMIDE 10 MG/ML
INJECTION, SOLUTION INTRAVENOUS PRN
Status: DISCONTINUED | OUTPATIENT
Start: 2020-10-10 | End: 2020-10-10 | Stop reason: SDUPTHER

## 2020-10-10 RX ORDER — 0.9 % SODIUM CHLORIDE 0.9 %
20 INTRAVENOUS SOLUTION INTRAVENOUS ONCE
Status: DISCONTINUED | OUTPATIENT
Start: 2020-10-10 | End: 2020-10-12

## 2020-10-10 RX ORDER — SODIUM CHLORIDE 9 MG/ML
INJECTION, SOLUTION INTRAVENOUS CONTINUOUS PRN
Status: DISCONTINUED | OUTPATIENT
Start: 2020-10-10 | End: 2020-10-10 | Stop reason: SDUPTHER

## 2020-10-10 RX ORDER — FENTANYL CITRATE 50 UG/ML
25 INJECTION, SOLUTION INTRAMUSCULAR; INTRAVENOUS EVERY 4 HOURS PRN
Status: DISCONTINUED | OUTPATIENT
Start: 2020-10-10 | End: 2020-10-15 | Stop reason: HOSPADM

## 2020-10-10 RX ORDER — METOPROLOL TARTRATE 5 MG/5ML
2.5 INJECTION INTRAVENOUS EVERY 6 HOURS
Status: DISCONTINUED | OUTPATIENT
Start: 2020-10-10 | End: 2020-10-14

## 2020-10-10 RX ADMIN — PIPERACILLIN SODIUM AND TAZOBACTAM SODIUM 3.38 G: 3; .375 INJECTION, POWDER, LYOPHILIZED, FOR SOLUTION INTRAVENOUS at 02:56

## 2020-10-10 RX ADMIN — SODIUM CHLORIDE: 9 INJECTION, SOLUTION INTRAVENOUS at 23:30

## 2020-10-10 RX ADMIN — SODIUM CHLORIDE: 9 INJECTION, SOLUTION INTRAVENOUS at 07:37

## 2020-10-10 RX ADMIN — CHLORHEXIDINE GLUCONATE 0.12% ORAL RINSE 15 ML: 1.2 LIQUID ORAL at 20:16

## 2020-10-10 RX ADMIN — Medication 100 MCG/HR: at 05:08

## 2020-10-10 RX ADMIN — SODIUM CHLORIDE: 9 INJECTION, SOLUTION INTRAVENOUS at 15:00

## 2020-10-10 RX ADMIN — ROCURONIUM BROMIDE 30 MG: 10 INJECTION, SOLUTION INTRAVENOUS at 07:49

## 2020-10-10 RX ADMIN — PANTOPRAZOLE SODIUM 40 MG: 40 INJECTION, POWDER, FOR SOLUTION INTRAVENOUS at 20:16

## 2020-10-10 RX ADMIN — PIPERACILLIN SODIUM AND TAZOBACTAM SODIUM 3.38 G: 3; .375 INJECTION, POWDER, LYOPHILIZED, FOR SOLUTION INTRAVENOUS at 18:20

## 2020-10-10 RX ADMIN — METRONIDAZOLE 500 MG: 500 INJECTION, SOLUTION INTRAVENOUS at 12:20

## 2020-10-10 RX ADMIN — Medication 175 MCG/HR: at 02:24

## 2020-10-10 RX ADMIN — POTASSIUM PHOSPHATE, MONOBASIC AND POTASSIUM PHOSPHATE, DIBASIC 30 MMOL: 224; 236 INJECTION, SOLUTION, CONCENTRATE INTRAVENOUS at 10:18

## 2020-10-10 RX ADMIN — Medication 10 ML: at 20:45

## 2020-10-10 RX ADMIN — PIPERACILLIN SODIUM AND TAZOBACTAM SODIUM 3.38 G: 3; .375 INJECTION, POWDER, LYOPHILIZED, FOR SOLUTION INTRAVENOUS at 10:17

## 2020-10-10 RX ADMIN — METOPROLOL TARTRATE 2.5 MG: 5 INJECTION INTRAVENOUS at 12:19

## 2020-10-10 RX ADMIN — CALCIUM GLUCONATE 2 G: 98 INJECTION, SOLUTION INTRAVENOUS at 18:22

## 2020-10-10 RX ADMIN — POTASSIUM CHLORIDE: 2 INJECTION, SOLUTION, CONCENTRATE INTRAVENOUS at 18:20

## 2020-10-10 RX ADMIN — POTASSIUM CHLORIDE AND SODIUM CHLORIDE: 900; 150 INJECTION, SOLUTION INTRAVENOUS at 10:18

## 2020-10-10 RX ADMIN — CHLORHEXIDINE GLUCONATE 0.12% ORAL RINSE 15 ML: 1.2 LIQUID ORAL at 10:17

## 2020-10-10 RX ADMIN — INSULIN LISPRO 1 UNITS: 100 INJECTION, SOLUTION INTRAVENOUS; SUBCUTANEOUS at 18:23

## 2020-10-10 RX ADMIN — METRONIDAZOLE 500 MG: 500 INJECTION, SOLUTION INTRAVENOUS at 04:30

## 2020-10-10 RX ADMIN — METRONIDAZOLE 500 MG: 500 INJECTION, SOLUTION INTRAVENOUS at 19:55

## 2020-10-10 RX ADMIN — Medication 10 ML: at 10:18

## 2020-10-10 RX ADMIN — PANTOPRAZOLE SODIUM 40 MG: 40 INJECTION, POWDER, FOR SOLUTION INTRAVENOUS at 10:18

## 2020-10-10 RX ADMIN — POTASSIUM CHLORIDE AND SODIUM CHLORIDE: 900; 150 INJECTION, SOLUTION INTRAVENOUS at 00:56

## 2020-10-10 ASSESSMENT — PAIN SCALES - GENERAL
PAINLEVEL_OUTOF10: 0

## 2020-10-10 ASSESSMENT — PULMONARY FUNCTION TESTS
PIF_VALUE: 21
PIF_VALUE: 26
PIF_VALUE: 21
PIF_VALUE: 21
PIF_VALUE: 22
PIF_VALUE: 22
PIF_VALUE: 25
PIF_VALUE: 20
PIF_VALUE: 21
PIF_VALUE: 22
PIF_VALUE: 22
PIF_VALUE: 24
PIF_VALUE: 26
PIF_VALUE: 22
PIF_VALUE: 21
PIF_VALUE: 21
PIF_VALUE: 23
PIF_VALUE: 21
PIF_VALUE: 21
PIF_VALUE: 20
PIF_VALUE: 21
PIF_VALUE: 22
PIF_VALUE: 22
PIF_VALUE: 25
PIF_VALUE: 22
PIF_VALUE: 25
PIF_VALUE: 21
PIF_VALUE: 20
PIF_VALUE: 22
PIF_VALUE: 22
PIF_VALUE: 21
PIF_VALUE: 22
PIF_VALUE: 21
PIF_VALUE: 22
PIF_VALUE: 23
PIF_VALUE: 21
PIF_VALUE: 23
PIF_VALUE: 21
PIF_VALUE: 23
PIF_VALUE: 21
PIF_VALUE: 21
PIF_VALUE: 23
PIF_VALUE: 22
PIF_VALUE: 21
PIF_VALUE: 22
PIF_VALUE: 22
PIF_VALUE: 21
PIF_VALUE: 25
PIF_VALUE: 21
PIF_VALUE: 22
PIF_VALUE: 21
PIF_VALUE: 25
PIF_VALUE: 20
PIF_VALUE: 21
PIF_VALUE: 22
PIF_VALUE: 22
PIF_VALUE: 21
PIF_VALUE: 22
PIF_VALUE: 21
PIF_VALUE: 20
PIF_VALUE: 23
PIF_VALUE: 21
PIF_VALUE: 22
PIF_VALUE: 22
PIF_VALUE: 21
PIF_VALUE: 22
PIF_VALUE: 21
PIF_VALUE: 22
PIF_VALUE: 21
PIF_VALUE: 21
PIF_VALUE: 22
PIF_VALUE: 21
PIF_VALUE: 22
PIF_VALUE: 21
PIF_VALUE: 23
PIF_VALUE: 21
PIF_VALUE: 2
PIF_VALUE: 22

## 2020-10-10 NOTE — PROGRESS NOTES
Department of Internal Medicine  Nephrology Attending Progress Note      Reason for Consult:   QI  Requesting Physician:  Dr Noé Conklin:  weakness    History Obtained From:  patient, family member - daughter    Sub: Patient seen and examined at bedside in the ICU  Events over the last 24 hours reviewed with the ICU RN, she is off of Levophed  S/p OR today  Now started on TPN          Past Medical History:        Diagnosis Date    Anemia     Atrial fibrillation ([address]     Colitis     Hypertension        Current Medications:    Current Facility-Administered Medications: 0.9 % sodium chloride bolus, 20 mL, Intravenous, Once  potassium phosphate 30 mmol in dextrose 5 % 250 mL IVPB, 30 mmol, Intravenous, Once  metoprolol (LOPRESSOR) injection 2.5 mg, 2.5 mg, Intravenous, Q6H  fentaNYL (SUBLIMAZE) injection 25 mcg, 25 mcg, Intravenous, Q4H PRN  0.9 % sodium chloride bolus, 20 mL, Intravenous, Once  0.9 % sodium chloride bolus, 20 mL, Intravenous, Once  PN-Adult  3-in-1 Central Line (Standard), , Intravenous, Continuous TPN  pantoprazole (PROTONIX) injection 40 mg, 40 mg, Intravenous, BID  sodium chloride flush 0.9 % injection 10 mL, 10 mL, Intravenous, BID  vasopressin 20 Units in dextrose 5 % 100 mL infusion, 0.02 Units/min, Intravenous, Continuous  piperacillin-tazobactam (ZOSYN) 3.375 g in dextrose 5 % 50 mL IVPB extended infusion (mini-bag), 3.375 g, Intravenous, Q8H  0.9 % sodium chloride infusion, , Intravenous, Q8H  norepinephrine (LEVOPHED) 16 mg in dextrose 5 % 250 mL infusion, 2 mcg/min, Intravenous, Continuous  0.9% NaCl with KCl 20 mEq infusion, , Intravenous, Continuous  [DISCONTINUED] lubrifresh P.M. (artificial tears) ophthalmic ointment 0.5 inch, 1 applicator, Both Eyes, PRN **AND** lubrifresh P.M. (artificial tears) ophthalmic ointment, , Both Eyes, PRN  chlorhexidine (PERIDEX) 0.12 % solution 15 mL, 15 mL, Mouth/Throat, BID  metronidazole (FLAGYL) 500 mg in NaCl 100 mL IVPB premix, 500 mg, Intravenous, Q8H  [Held by provider] enoxaparin (LOVENOX) injection 30 mg, 30 mg, Subcutaneous, Daily  Allergies:  Codeine    Social History:    TOBACCO:  Never used tobacco  ETOH:  Never drank alcohol  DRUGS:  Never used recreational drugs    Family History:   History reviewed. No pertinent family history.     PHYSICAL EXAM:      Vitals:    VITALS:  BP (!) 119/58   Pulse 98   Temp 97.5 °F (36.4 °C) (Oral)   Resp 15   Ht 5' 6\" (1.676 m)   Wt 231 lb 14.8 oz (105.2 kg)   SpO2 95%   BMI 37.43 kg/m²   24HR BLOOD PRESSURE RANGE:  Systolic (53LIV), RAMILA:276 , Min:101 , IDS:918   ; Diastolic (06TDB), GSV:56, Min:49, Max:80    24HR INTAKE/OUTPUT:      Intake/Output Summary (Last 24 hours) at 10/10/2020 1309  Last data filed at 10/10/2020 1018  Gross per 24 hour   Intake 7978 ml   Output 2825 ml   Net 5153 ml       Constitutional:  Well developed and nourished , no acute distress, intubated and sedated, seen in the ICU, Fio2 at 40 %  HEENT:  NC, PERRL, oral mucosa dry , neck supple, no JVD  Respiratory:  Clear bilaterally except decreased BS at bases  Cardiovascular/Edema: RRR, s1,s2 no gallop  Gastrointestinal:  Wound vac in place, caraballo in place draining concentrated urine  Neurologic:  Alert and Ox 3 , nonfocal  Skin:  Decreased turgor  Other:  No rash    DATA:    CBC:   Lab Results   Component Value Date    WBC 29.9 10/10/2020    RBC 2.60 10/10/2020    HGB 7.7 10/10/2020    HCT 24.0 10/10/2020    MCV 92.3 10/10/2020    MCH 29.6 10/10/2020    MCHC 32.1 10/10/2020    RDW 19.4 10/10/2020    PLT 45 10/10/2020    MPV 9.7 10/10/2020     CMP:    Lab Results   Component Value Date     10/10/2020    K 4.0 10/10/2020    K 3.8 10/09/2020     10/10/2020    CO2 26 10/10/2020    BUN 27 10/10/2020    CREATININE 0.7 10/10/2020    GFRAA >60 10/10/2020    LABGLOM >60 10/10/2020    GLUCOSE 174 10/10/2020    PROT 4.2 10/10/2020    LABALBU 2.3 10/10/2020    CALCIUM 7.0 10/10/2020    BILITOT 1.6 10/10/2020    ALKPHOS 175 10/10/2020    AST 27 10/10/2020    ALT 13 10/10/2020     Magnesium:    Lab Results   Component Value Date    MG 2.0 10/10/2020     Phosphorus:    Lab Results   Component Value Date    PHOS 1.3 10/10/2020     Uric Acid:  No results found for: Rebeca Shanks  U/A:    Lab Results   Component Value Date    COLORU CASEY 10/04/2020    PROTEINU TRACE 10/04/2020    PHUR 5.0 10/04/2020    WBCUA >20 10/04/2020    RBCUA NONE 10/04/2020    BACTERIA FEW 10/04/2020    CLARITYU TURBID 10/04/2020    SPECGRAV 1.025 10/04/2020    LEUKOCYTESUR MODERATE 10/04/2020    UROBILINOGEN 0.2 10/04/2020    BILIRUBINUR MODERATE 10/04/2020    BLOODU Negative 10/04/2020    GLUCOSEU Negative 10/04/2020     Radiology Review:    Atelectasis and pleural effusion in the lung bases more on the left side.         Right IJ central line has noted without complications.           Problems resolved:       IMPRESSION/RECOMMENDATIONS:      Juan Forde is a 82 y.o. female with significant past medical history of Colitis, diarrhea, blood in stool, A-Fib, anemia, and HTN admitted via ED for hypotension and diarrhea. Pt with SBP in 80's in ED, she was treated with IV fluids. Pt has been having diarrhea for > 3 months stating stools are dark with red blood associated nausea, vomiting, and poor oral intake. Daughter reports that patient was discharged from [facility] and was living with her. Patient got progressively worse therefore she brought her to ED. Initial labs done showed serum sodium 126 and creatinine of 1.3 mg/dl yesterday which worsened to 122 and 1.6 mg/dl respectively therefore this consultation was ordered. 1. QI secondary to volume responsive prerenal state versus ischemic ATN    2. Hyponatremia probably secondary to GI losses  3. C. Difficile colitis, with evidence of fulminant colitis with multiple perforations, s/p total colectomy  4.  High anion gap metabolic acidosis secondary to lactic acidosis with severe hypotension,

## 2020-10-10 NOTE — ANESTHESIA POSTPROCEDURE EVALUATION
Department of Anesthesiology  Postprocedure Note    Patient: Michaela Bennett  MRN: [id number]  YOB: 1935  Date of evaluation: 10/10/2020  Time:  1:00 PM     Procedure Summary     Date:  10/10/20 Room / Location:  [place] [address] / [facility]    Anesthesia Start:  5012 Anesthesia Stop:  6011    Procedure:  SECOND LOOK EXPLORATORY LAPAROTOMY,  BOWEL RESECTION, ILEOSTOMY CREATION,  RE-APPLICATION OF WOUND VAC (N/A Abdomen) Diagnosis:  (/)    Surgeon:  Ming Daley MD Responsible Provider:  Aleisha Haji MD    Anesthesia Type:  general ASA Status:  4          Anesthesia Type: general    Melinda Phase I:      Melinda Phase II:      Last vitals: Reviewed and per EMR flowsheets.        Anesthesia Post Evaluation    Patient location during evaluation: ICU  Level of consciousness: sedated and ventilated  Airway patency: patent  Nausea & Vomiting: no nausea and no vomiting  Complications: no  Cardiovascular status: hemodynamically stable  Respiratory status: acceptable and intubated

## 2020-10-10 NOTE — PROGRESS NOTES
Patient placed on PS of 15; patient kept going into apnea ventilation. Will try again later when patient is more awake & alert.

## 2020-10-10 NOTE — PROGRESS NOTES
Critical Care Team - Daily Progress Note      Date and time: 10/10/2020 6:48 AM  Patient's name:  Gaby Myers  Medical Record Number: [id number]  Patient's account/billing number: [de-identified]  Patient's YOB: 1935  Age: 82 y.o. Date of Admission: 10/4/2020  4:26 PM  Length of stay during current admission: 6      Primary Care Physician: Luis Fernando Plunkett MD  ICU Attending Physician: Dr. Parish Suarez Status: Limited    Reason for ICU admission: septic shock, c.diff colitis   post operative respiratory failure requiring Mechanical Ventilation s/p total colectomy       SUBJECTIVE:   The patient is a 82 y. o. female with significant past medical history of A-fib, HTN, and anemia that presented to the ER due to hypotension and diarrhea. Patient was recently diagnosed with UTI and was placed on antibiotics. Patient has been having diarrhea for a few weeks. Patient has been having diffuse cramping abdominal pain with no radiation. Patient was found to be guaiac positive in the ER. Patient was transferred out of the ICU on 10/06. On 10/07, patient had an increase in abdominal pain and peritonitis, so she was brought to the OR. Patient underwent a total colectomy with abthera open abdominal dressing. Patient was re-admitted to the ICU due to need for mechanical ventilation. OVERNIGHT EVENTS:        10/10/2020  Vasopressors were stopped overnight. Nurse reported that 1300 cc were drained from wound VAC. Nurse reports that patient's pressures have been fluctuating overnight.   Platelet count 45      AWAKE & FOLLOWING COMMANDS:  [x] No   [] Yes    CURRENT VENTILATION STATUS:     [x] Ventilator  [] BIPAP  [] Nasal Cannula [] Room Air      IF INTUBATED, ET TUBE MARKING AT LOWER LIP:       cms    SECRETIONS Amount:  [] Small [] Moderate  [] Large  [x] None  Color:     [] White [] Colored  [] Bloody    SEDATION:  RAAS Score:  [x] Fentanyl [] Versed gtt  [] Ativan gtt   [] No Sedation    PARALYZED:  [x] No    [] Yes    DIARRHEA:                [x] No                [] Yes  (C. Difficile status: [] positive                                                                                                                       [] negative                                                                                                                     [] pending)    VASOPRESSORS:  [] No    [x] Yes    If yes -   [x] Levophed       [] Dopamine     [x] Vasopressin       [] Dobutamine  [] Phenylephrine         [] Epinephrine    CENTRAL LINES:     [] No   [x] Yes   (Date of Insertion: 10/04/2020  )           If yes -     [x] Right IJ     [] Left IJ [] Right Femoral [] Left Femoral                   [] Right Subclavian [] Left Subclavian       WEST'S CATHETER:   [] No   [x] Yes  (Date of Insertion: 10/04/2020 )     URINE OUTPUT:            [] Good   [x] Low              [] Anuric      OBJECTIVE:     VITAL SIGNS:  BP (!) 137/56   Pulse 87   Temp 98.3 °F (36.8 °C) (Axillary)   Resp 15   Ht 5' 6\" (1.676 m)   Wt 231 lb 14.8 oz (105.2 kg)   SpO2 96%   BMI 37.43 kg/m²   Tmax over 24 hours:  Temp (24hrs), Av.9 °F (36.6 °C), Min:97.2 °F (36.2 °C), Max:98.4 °F (36.9 °C)      Patient Vitals for the past 6 hrs:   BP Temp Temp src Pulse Resp SpO2 Weight   10/10/20 0600 -- -- -- 87 15 96 % --   10/10/20 0515 -- -- -- -- -- -- 231 lb 14.8 oz (105.2 kg)   10/10/20 0500 -- -- -- 127 24 94 % --   10/10/20 0434 -- -- -- 119 16 97 % --   10/10/20 0400 (!) 137/56 98.3 °F (36.8 °C) Axillary 111 16 97 % --   10/10/20 0345 -- -- -- 101 16 -- --   10/10/20 0337 (!) 133/56 98.3 °F (36.8 °C) -- 99 16 97 % --   10/10/20 0322 (!) 142/61 98.3 °F (36.8 °C) Axillary 105 15 -- --   10/10/20 0300 -- -- -- 94 16 97 % --   10/10/20 0244 (!) 117/54 98.4 °F (36.9 °C) Axillary 107 16 97 % --   10/10/20 0200 -- -- -- 111 16 97 % --   10/10/20 0100 -- -- -- 106 16 97 % --         Intake/Output Summary (Last 24 hours) at 10/10/2020 0648  Last data filed at 10/10/2020 0520  Gross per 24 hour   Intake 8465.5 ml   Output 2755 ml   Net 5710.5 ml     Wt Readings from Last 2 Encounters:   10/10/20 231 lb 14.8 oz (105.2 kg)     Body mass index is 37.43 kg/m². PHYSICAL EXAMINATION:  BP (!) 137/56   Pulse 87   Temp 98.3 °F (36.8 °C) (Axillary)   Resp 15   Ht 5' 6\" (1.676 m)   Wt 231 lb 14.8 oz (105.2 kg)   SpO2 96%   BMI 37.43 kg/m²   Physical Exam  Vitals signs reviewed. Constitutional:       Appearance: She is ill-appearing. Comments: Intubated, sedated, following commands; GCS 7, RASS -2   HENT:      Head: Normocephalic. Mouth/Throat:      Mouth: Mucous membranes are dry. Eyes:      Pupils: Pupils are equal, round, and reactive to light. Cardiovascular:      Rate and Rhythm: Tachycardia present. Rhythm irregularly irregular. Heart sounds: Normal heart sounds. No murmur. Pulmonary:      Breath sounds: Rhonchi present. Abdominal:      Palpations: Abdomen is soft. Comments: Open, packed, dressing bloody; wound vac in place drained 1800 cc total   Musculoskeletal:      Right lower le+ Edema present. Left lower le+ Edema present. Skin:     Capillary Refill: Capillary refill takes 2-3 seconds. Coloration: Skin is pale.  Bluing of the fingers noted   Neurological:      Comments: Sedated, intubated       Any additional physical findings:    MEDICATIONS:    Scheduled Meds:   sodium chloride  20 mL Intravenous Once    potassium phosphate IVPB  30 mmol Intravenous Once    sodium chloride  20 mL Intravenous Once    sodium chloride  20 mL Intravenous Once    pantoprazole  40 mg Intravenous BID    sodium chloride flush  10 mL Intravenous BID    piperacillin-tazobactam  3.375 g Intravenous Q8H    chlorhexidine  15 mL Mouth/Throat BID    metroNIDAZOLE  500 mg Intravenous Q8H    [Held by provider] enoxaparin  30 mg Subcutaneous Daily     Continuous Infusions:   PN-Adult  3 IN 1 0. 7   GLUCOSE 258* 159* 174*   CALCIUM 6.2* 6.4* 7.0*   PROT  --  3.3* 4.2*   LABALBU  --  1.7* 2.3*   BILITOT  --  1.7* 1.6*   ALKPHOS  --  116* 175*   AST  --  26 27   ALT  --  11 13      Magnesium:   Lab Results   Component Value Date    MG 2.0 10/10/2020     Phosphorus:   Lab Results   Component Value Date    PHOS 1.3 10/10/2020     Ionized Calcium:   Lab Results   Component Value Date    CAION 1.11 10/10/2020        Urinalysis:     Troponin: No results for input(s): TROPONINI in the last 72 hours. Microbiology:    Cultures during this admission:     Blood cultures:                 [] None drawn      [] Negative             []  Positive (Details:  )  Urine Culture:                   [] None drawn      [] Negative             []  Positive (Details:  )  Sputum Culture:               [] None drawn       [] Negative             []  Positive (Details:  )   Endotracheal aspirate:     [] None drawn       [] Negative             []  Positive (Details:  )     Other pertinent Labs:   C-Diff +    Radiology/Imaging:   Xr Abdomen (kub) (single Ap View)    Result Date: 10/7/2020  EXAMINATION: ONE SUPINE XRAY VIEW(S) OF THE ABDOMEN 10/7/2020 10:01 am COMPARISON: Abdomen radiograph 10/06/2020 and abdomen CT 10/04/2020 HISTORY: ORDERING SYSTEM PROVIDED HISTORY: abd pain 10/10 TECHNOLOGIST PROVIDED HISTORY: Reason for exam:->abd pain 10/10 FINDINGS: Again noted is gas-filled distention of the transverse colon without significant change from yesterday x-ray. Mural thumbprinting sign is again present in the transverse, descending, and sigmoid colon region. Slight prominence of small bowel gas without distention. No radiographic evidence of mass or urologically significant calcification. Left lung base remains opacified with consolidation, non-specific. Transverse, descending, and sigmoid colon mural thickening may reflect edema and/or inflammation/infection, without significant interval change.  Slight prominence of gas throughout the small intestine, without gross distension, suggestive of paralytic ileus. Persisting left lung base opacity may be atelectasis with pleural effusion. Differential again includes infectious infiltrate     Xr Abdomen (kub) (single Ap View)    Result Date: 10/6/2020  EXAMINATION: ONE SUPINE XRAY VIEW(S) OF THE ABDOMEN 10/6/2020 11:11 am COMPARISON: CT abdomen pelvis October 4, 2020 HISTORY: ORDERING SYSTEM PROVIDED HISTORY: white count TECHNOLOGIST PROVIDED HISTORY: Reason for exam:->white count FINDINGS: The stomach is not dilated. There is a prominent loop of transverse colon with thumbprinting sign. The small bowels are normal caliber. No abnormal intra-abdominal calcifications. Thickening of the transverse colon wall. Consistent with inflammation identified on prior CT. Ct Abdomen Pelvis W Iv Contrast Additional Contrast? None    Result Date: 10/4/2020  EXAMINATION: CT OF THE ABDOMEN AND PELVIS WITH CONTRAST 10/4/2020 7:53 pm TECHNIQUE: CT of the abdomen and pelvis was performed with the administration of intravenous contrast. Multiplanar reformatted images are provided for review. Dose modulation, iterative reconstruction, and/or weight based adjustment of the mA/kV was utilized to reduce the radiation dose to as low as reasonably achievable. COMPARISON: None. HISTORY: ORDERING SYSTEM PROVIDED HISTORY: abd pain, hypotension, gi bleed TECHNOLOGIST PROVIDED HISTORY: Reason for exam:->abd pain, hypotension, gi bleed Additional Contrast?->None FINDINGS: The lung bases demonstrate cardiomegaly with a tiny pericardial effusion. There is minimal atelectasis and pleural effusion in the lung bases left more than right. The liver is fatty infiltrated. The gallbladder is distended without acute inflammation. Consider ultrasonography for better assessment of gallbladder. Spleen, pancreas, the adrenals and the kidneys are normal except for a 1.5 cm cystic lesion in the right kidney.   There is degenerative changes in the lumbar spine. .  The urinary bladder is contracted with a Martin catheter and wall thickening. There is diffuse thickening and inflammation of the transverse colon, descending and rectosigmoid colon with hyperdensity concerning for active bleeding/contrast extravasation. There is inflammatory changes in the presacral area. A tiny air bubble is identified in the rectovesical pouch which could represent waled off microperforation. Appendix cannot be identified. There is some inflammatory changes in the gluteus region     Diffuse inflammation of the transverse colon, descending and rectosigmoid colon with wall thickening and edema with active contrast extravasation/bleeding. Hemorrhagic diverticulitis or colitis are considered. There is some edema in the presacral area and tiny amount of air collection in the rectovesical pouch. Walled-off microperforation is considered. Distended gallbladder. Atelectasis/pleural effusion in the left lung base. RECOMMENDATIONS: The clinical service was notified     Ct Abdomen Pelvis W Iv Contrast Additional Contrast? None    Addendum Date: 10/4/2020    ADDENDUM: In the title of the examination, disregard the CT abdomen and pelvis. Result Date: 10/4/2020  EXAMINATION: ONE XRAY VIEW OF THE CHEST; CT OF THE ABDOMEN AND PELVIS WITH CONTRAST 10/4/2020 7:06 pm COMPARISON: October 4, 2020 HISTORY: ORDERING SYSTEM PROVIDED HISTORY: post R IJ CVC placement TECHNOLOGIST PROVIDED HISTORY: Reason for exam:->post R IJ CVC placement     There is a borderline cardiac size. There is atelectasis in the lung bases with a small left pleural effusion. Left subclavian pacemaker is unchanged. Right IJ central line is noted with the tip at the atrial caval junction. There is no pneumothorax. RECOMMENDATION: Atelectasis and pleural effusion in the lung bases more on the left side. Right IJ central line has noted without complications.      Us Gallbladder Ruq    Result Date: 10/6/2020  EXAMINATION: RIGHT UPPER QUADRANT ULTRASOUND 10/5/2020 10:46 pm COMPARISON: CT abdomen pelvis 10/04/2020 HISTORY: ORDERING SYSTEM PROVIDED HISTORY: Elevated alk phos, LUQ pain TECHNOLOGIST PROVIDED HISTORY: Reason for exam:->Elevated alk phos, LUQ pain What reading provider will be dictating this exam?->CRC FINDINGS: LIVER:  Normal liver size and contour. Increased parenchymal echogenicity. No focal lesion. BILIARY SYSTEM:  No intra or extrahepatic bile duct dilatation. Common bile duct measures 5 mm. Gallbladder contains shadowing dependent gallstones. No wall thickening or pericholecystic fluid. Reported positive sonographic Hopson's sign. RIGHT KIDNEY: Right kidney measures 10.1 x 4.5 x 5.2 cm. Anechoic cyst at the superior pole measures up to 15 mm. An echogenic mid polar region focus measures 9 x 9 x 9 mm and correlates with hypodensity on CT, probably a tiny angiomyolipoma. PANCREAS:  Visualized portions of the pancreas are unremarkable. OTHER: Right pleural effusions suspected. No ascites. Cholelithiasis with positive sonographic Hopson's sign but no wall thickening or pericholecystic inflammatory changes, altogether equivocal for cholecystitis. Consider functional analysis with nuclear medicine HIDA scan. Hepatic steatosis. 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 Suspected small right pleural effusion. Xr Chest Portable    Result Date: 10/8/2020  EXAMINATION: ONE XRAY VIEW OF THE CHEST 10/8/2020 6:47 am COMPARISON: 10/07/2020 HISTORY: ORDERING SYSTEM PROVIDED HISTORY: intubated TECHNOLOGIST PROVIDED HISTORY: Reason for exam:->intubated FINDINGS: There is stable position of the support lines and tubes. There is minimal atelectasis seen within the right lung base unchanged when compared to prior study. There is a small left pleural effusion and left lung base atelectasis. Left upper lobe is clear. These findings are also unchanged.      1. No interval change in the small left pleural effusion and left lung base atelectasis. 2. No change in the minimal right lung base atelectasis. Xr Chest Portable    Result Date: 10/7/2020  EXAMINATION: ONE XRAY VIEW OF THE CHEST 10/7/2020 8:17 pm COMPARISON: October 4, 2020 HISTORY: ORDERING SYSTEM PROVIDED HISTORY: intbuated TECHNOLOGIST PROVIDED HISTORY: Reason for exam:->intbuated FINDINGS: Left pacemaker is present. Endotracheal tube is present with distal tip approximately 3 cm above the bernadette. Nasogastric tube courses below the level of the diaphragm. Right IJ central venous catheter is present with distal tip at location of superior vena cava. There are persistent opacities at left lung base silhouetting left hemidiaphragm and left costophrenic sulcus. The heart appears normal in size. No pneumothorax. Opacities are present in left lung base which appear to have increased in could suggest increasing pneumonia, atelectasis, or effusion. Xr Chest Portable    Addendum Date: 10/4/2020    ADDENDUM: In the title of the examination, disregard the CT abdomen and pelvis. Result Date: 10/4/2020  EXAMINATION: ONE XRAY VIEW OF THE CHEST; CT OF THE ABDOMEN AND PELVIS WITH CONTRAST 10/4/2020 7:06 pm COMPARISON: October 4, 2020 HISTORY: ORDERING SYSTEM PROVIDED HISTORY: post R IJ CVC placement TECHNOLOGIST PROVIDED HISTORY: Reason for exam:->post R IJ CVC placement     There is a borderline cardiac size. There is atelectasis in the lung bases with a small left pleural effusion. Left subclavian pacemaker is unchanged. Right IJ central line is noted with the tip at the atrial caval junction. There is no pneumothorax. RECOMMENDATION: Atelectasis and pleural effusion in the lung bases more on the left side. Right IJ central line has noted without complications. Xr Chest 1 View    Result Date: 10/4/2020  EXAMINATION: ONE XRAY VIEW OF THE CHEST 10/4/2020 4:06 pm COMPARISON: None.  HISTORY: ORDERING SYSTEM PROVIDED HISTORY: fatigue TECHNOLOGIST PROVIDED HISTORY: Reason for exam:->fatigue FINDINGS: Cardiac silhouette is not enlarged. Dual-chamber pacemaker in place. Pulmonary vasculature within normal limits. Blunting of the left costophrenic angle and opacity along the left costal margin most likely small left pleural effusion. No airspace consolidation. Suspect left pleural effusion. No airspace consolidation. Dual-chamber pacemaker in place. Follow-up PA and lateral radiographs may be helpful in further evaluation. Xr Chest Abdomen Ng Placement    Result Date: 10/7/2020  EXAMINATION: ONE SUPINE XRAY VIEW(S) OF THE ABDOMEN 10/7/2020 2:07 pm COMPARISON: None. HISTORY: ORDERING SYSTEM PROVIDED HISTORY: NGT Placement TECHNOLOGIST PROVIDED HISTORY: Reason for exam:->NGT Placement What reading provider will be dictating this exam?->CRC FINDINGS: Nasogastric tube courses below the level of the diaphragm with distal tip in the expected location of the stomach. Satisfactory position of nasogastric tube. Chest Xray (10/10/2020):   Lines/tubes are appropriate.  Moderate left effusion and left lower lobe    infiltrate are unchanged.  There is a tiny right effusion.  Heart size is    normal        ASSESSMENT:     Neuro   Intubated and Sedated             -On Fentanyl            -RASS -4        Respiratory   Mechanical ventilation            -Intubated in OR 10/07-day 4            -Wean as tolerated             -CXR daily             -ABG 8.31/50.8/95.4/61.6         Metabolic Acidosis   Likely due to sepsis; improving             -LA 2.2                Cardiovascular   A-fib with RVR   Hx of A-fib, on Sotalol and Metoprolol at home    -Digoxin 250 mg every 3 hours PRN ordered    -No doses given            -Cardiology following.     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    Fulminant Colitis  S/p total colectomy on 10/07     -Back to OR today for colostomy      -Wound vac- 1300 overnight             -General surgery following     Protonix PPx     Renal   QI   Resolved              -Cre 0.7 today              Metabolic acidosis  Likely due to diarrhea and sepsis            -Improving        Hypophosphatemia    -Phos 1.3   -Potassium Phosphate 30 mmol given today   -Replete as needed    Hypomagnesemia    -Mag 2.0 today   -Keep magnesium > 2           Infectious Disease   Septic Shock   Likely due to c,diff colitis with toxic megacolon   Acute peritonitis secondary to ruptured viscous   Improving              -WBC worsening; WBC 54.0              -On Zosyn day 2, Flagyl day 3       -Blood cultures show no growth in 24 hours     Fulminant Colitis   Hx of Ulcerative colitis  S/p total colectomy on 10/07              -C-diff positive               -On Flagyl IV day 4, Zosyn day 3          Hematology/Oncology   Anemia  Likely multi-factorial- GI bleed, anemia of chronic disease              -Hgb 10.0              -Transfuse < 7               -CBC daily     Thrombocytopenia- consumption vs sequestration   Check HIT ab hold heparin abs.  Peripheral smear   She will need anticoagulation when she is able   Likely due to septic shock      -Platelets 45 today      -Patient received 6 units      -Lovenox held; PCD's in place     4 -Check fibrinogen and INR today     Occlusive thrombus left brachial vein             - PLT count low, going to surgery today       Endocrine   No acute issues  Keep blood glucose < 180     Social/Spiritual/DNR/Other   Limited code  NPO  Dietary consulted for TPN      Lines:  CVC RIJ 10/04/2020- day 7  Martin 10/04/2020- day 7  Midline Right- day 6  Left radial Art- day 4    Consults:  Infectious disease  Cardiology  GI  General surgery  Nephrology     Solange Otero DO, PGY-1                    10/10/2020, 6:48 AM     I personally saw, examined and provided care for the

## 2020-10-10 NOTE — PROGRESS NOTES
[facility]    Name: Desiree Bence    Age: 82 y.o. Date of Admission: 10/4/2020  4:26 PM    Date of Service: 10/10/2020    Reason for Consultation: re atrial fibrillation    Referring Physician: Dr Marcelle Vera  Primary Care Physician: Mily Mcclain MD    History of Present Illness: The patient is a 82y.o. year old female with a history of atrial fibrillation on sotalol for rhythm control and anticoagulated with warfarin. In September admitted to [facility] with rectal bleeding and INR of 9 so warfarin was been on hold for a month. She also had some UTIs treated with antibiotics. On 10/4 admitted to ICU here from [facility] for diarrhea, hyponatremia, and hypotension. Hydrated, sotalol held and when bp improved transferred to [facility]. Was complaining of abdominal pain , developed toxic megacolon and taken to OR yesterday for colectomy and then to ICU. Developed her atrial fibrillation once again. Now intubated, on multiple pressors. History of AF suppressed with sotalol . remote h/o pericardial effusion 7/2016, last echo 8/2016 EF 60% LAE, 1-2+AI,MR, 1+ TR, RVSP 40-45mmHg, no pericardial effusion. 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 10/10/2020 interim history  Back to the OR earlier this morning for second look exploratory laparotomy with small bowel resection/ileostomy creation. Left arm DVT. Converted to sinus rhythm spontaneously yesterday evening. Telemetry reviewed: Remains in sinus rhythm  Potassium 4 creatinine 1.1  Chest x-ray showing atelectasis. Ultrasound of arm: Left proximal brachial vein thrombus      Review of Systems:   Cannot be obtained as on vent/fentanyl    Allergies: Allergies   Allergen Reactions    Codeine        Home Medications:  Prior to Admission medications    Medication Sig Start Date End Date Taking?  Authorizing Provider   acetaminophen (TYLENOL) 325 MG tablet Take 650 mg by mouth every 6 hours as needed for Pain   Yes Historical Provider, MD   amitriptyline (ELAVIL) 10 MG tablet Take 10 mg by mouth nightly   Yes Historical Provider, MD   dicyclomine (BENTYL) 10 MG capsule Take 10 mg by mouth every 6 hours as needed   Yes Historical Provider, MD   calcium carbonate 600 MG TABS tablet Take 1 tablet by mouth daily   Yes Historical Provider, MD   ferrous sulfate (IRON 325) 325 (65 Fe) MG tablet Take 325 mg by mouth daily (with breakfast)   Yes Historical Provider, MD   lisinopril (PRINIVIL;ZESTRIL) 40 MG tablet Take 40 mg by mouth daily   Yes Historical Provider, MD   melatonin 3 MG TABS tablet Take 6 mg by mouth daily   Yes Historical Provider, MD   tamsulosin (FLOMAX) 0.4 MG capsule Take 0.4 mg by mouth nightly    Historical Provider, MD   loperamide (IMODIUM) 2 MG capsule Take 2 mg by mouth every 12 hours as needed for Diarrhea    Historical Provider, MD   mesalamine (PENTASA) 500 MG extended release capsule Take 500 mg by mouth 4 times daily    Historical Provider, MD   cefTRIAXone (ROCEPHIN) 1 g injection Infuse 1 g intravenously every 24 hours    Historical Provider, MD   sotalol (BETAPACE) 120 MG tablet Take 120 mg by mouth 2 times daily    Historical Provider, MD   ondansetron (ZOFRAN) 4 MG tablet Take 4 mg by mouth every 6 hours as needed for Nausea or Vomiting    Historical Provider, MD       Current Medications:  Current Facility-Administered Medications   Medication Dose Route Frequency Provider Last Rate Last Dose    0.9 % sodium chloride bolus  20 mL Intravenous Once W. RAdenike Jennifer, DO        potassium phosphate 30 mmol in dextrose 5 % 250 mL IVPB  30 mmol Intravenous Once [employer], DO 62.5 mL/hr at 10/10/20 1018 30 mmol at 10/10/20 1018    0.9 % sodium chloride bolus  20 mL Intravenous Once Cony Wilson MD        0.9 % sodium chloride bolus  20 mL Intravenous Once Nathaniel Cuenca MD        PN-Adult  3-in-1 Central Line (Standard)   Intravenous Continuous TPN Jacinda Vance MD 75 mL/hr at 10/09/20 1801      pantoprazole (PROTONIX) injection 40 mg  40 mg Intravenous BID Thien Woodall MD   40 mg at 10/10/20 1018    sodium chloride flush 0.9 % injection 10 mL  10 mL Intravenous BID Lilly Kelsey MD   10 mL at 10/10/20 1018    vasopressin 20 Units in dextrose 5 % 100 mL infusion  0.02 Units/min Intravenous Continuous Thien Woodall MD 6 mL/hr at 10/09/20 1450 0.02 Units/min at 10/09/20 1450    digoxin (LANOXIN) injection 250 mcg  250 mcg Intravenous Q2H PRN Esteban Hinojosa MD        piperacillin-tazobactam (ZOSYN) 3.375 g in dextrose 5 % 50 mL IVPB extended infusion (mini-bag)  3.375 g Intravenous Q8H Kirk Tatum MD 12.5 mL/hr at 10/10/20 1017 3.375 g at 10/10/20 1017    0.9 % sodium chloride infusion   Intravenous Q8H Kirk Tatum MD 12.5 mL/hr at 10/09/20 2309      norepinephrine (LEVOPHED) 16 mg in dextrose 5 % 250 mL infusion  2 mcg/min Intravenous Continuous Jacinda Vance MD   Stopped at 10/09/20 1226    0.9% NaCl with KCl 20 mEq infusion   Intravenous Continuous Arelis Payne  mL/hr at 10/10/20 1018      fentaNYL 5 mcg/ml in 0.9%  ml infusion  25 mcg/hr Intravenous Continuous Bin Daniels MD 5 mL/hr at 10/10/20 1003 25 mcg/hr at 10/10/20 1003    propofol injection  10 mcg/kg/min Intravenous Continuous Bin Daniels MD        lubrifresh P.M. (artificial tears) ophthalmic ointment   Both Eyes PRN Bin Daniels MD        chlorhexidine (PERIDEX) 0.12 % solution 15 mL  15 mL Mouth/Throat BID Bin Daniels MD   15 mL at 10/10/20 1017    metronidazole (FLAGYL) 500 mg in NaCl 100 mL IVPB premix  500 mg Intravenous Q8H Bin Daniels MD   Stopped at 10/10/20 0530    [Held by provider] enoxaparin (LOVENOX) injection 30 mg  30 mg Subcutaneous Daily Bin Daniels MD   30 mg at 10/07/20 0816      PN-Adult  3 IN 1 Central Line (Standard) 75 mL/hr at 10/09/20 1801    vasopressin (Septic Shock) infusion 0.02 Units/min (10/09/20 1450)    sodium chloride 12.5 mL/hr at 10/09/20 2309    norepinephrine Stopped (10/09/20 1226)    0.9% NaCl with KCl 20 mEq 100 mL/hr at 10/10/20 1018    fentaNYL 5 mcg/ml in 0.9%  ml infusion 25 mcg/hr (10/10/20 1003)    propofol         Physical Exam:  BP (!) 152/73   Pulse 96   Temp 97.8 °F (36.6 °C) (Oral)   Resp 15   Ht 5' 6\" (1.676 m)   Wt 231 lb 14.8 oz (105.2 kg)   SpO2 94%   BMI 37.43 kg/m²   Weight change: 18 lb 15.4 oz (8.6 kg)  Wt Readings from Last 3 Encounters:   10/10/20 231 lb 14.8 oz (105.2 kg)     General: Appears comfortable on current ventilator settings/sedation  HEENT: Normocephalic and atraumatic, pupils equal and round  Neck: No JVD, no carotid bruits,   Cardiac: Regular rate and rhythm. Normal S1 and physiologically split S2, no S3, no S4. Apical impulse is nondisplaced. No murmurs, no pericardial rubs, no clicks. Carotid upstrokes brisk. Respiratory: Clear anteriorly no wheezes, no rales, no rhonchi. On vent  Abdomen: s/p colectomy  Extremities: No edema, no cyanosis, no clubbing.   Distal pulses intact  Skin: Intact, warm and dry, no rashes, no breakdown  Musculoskeletal: normal tone and strength in the upper and lower extremities bilaterally  Neuro:not responsive on propofol at present    Intake/Output:    Intake/Output Summary (Last 24 hours) at 10/10/2020 1115  Last data filed at 10/10/2020 1018  Gross per 24 hour   Intake 9065.5 ml   Output 2825 ml   Net 6240.5 ml     I/O this shift:  In: 650 [I.V.:400; Blood:250]  Out: 200 [Drains:200]    Laboratory Tests:  Last 3 CBC:  Recent Labs     10/09/20  1745 10/09/20  2326 10/10/20  0513   WBC 35.3* 29.8* 29.9*   RBC 2.70* 2.62* 2.60*   HGB 8.1* 7.8* 7.7*   HCT 24.5* 23.9* 24.0*   MCV 90.7 91.2 92.3   MCH 30.0 29.8 29.6   MCHC 33.1 32.6 32.1   RDW 19.1* 19.2* 19.4*   PLT 19* 29* 45*   MPV 10.4 10.5 9.7       Last 3 CMP:    Recent Labs     10/08/20  0622 10/08/20  1600 10/09/20  0515 10/10/20  0513    136 140 138   K 3.8 3.3* 3.8  3.8 4.0   * 108* 110* 109*   CO2 18* 18* 21* 26   BUN 32* 29* 29* 27*   CREATININE 1.0 0.9 0.9 0.7   GLUCOSE 200* 258* 159* 174*   CALCIUM 7.1* 6.2* 6.4* 7.0*   PROT 3.8*  --  3.3* 4.2*   LABALBU 2.3*  --  1.7* 2.3*   BILITOT 2.2*  --  1.7* 1.6*   ALKPHOS 105*  --  116* 175*   AST 23  --  26 27   ALT 11  --  11 13       Last 3 Mag/Phos:  Recent Labs     10/08/20  0622 10/09/20  0515 10/10/20  0513   MG 1.9 1.7 2.0   PHOS 2.6 1.7* 1.3*       Last 3 CK, CKMB, Troponin  No results for input(s): CKTOTAL, CKMB, TROPONINI in the last 72 hours. Last 3 Pro-BNP:  No results for input(s): PROBNP in the last 72 hours. No results found for: PROBNP    Last 3 Glucose:     Recent Labs     10/08/20  1600 10/09/20  0515 10/10/20  0513   GLUCOSE 258* 159* 174*       Last 3 Coags:  Recent Labs     10/09/20  0818 10/09/20  1310 10/10/20  0513   PROTIME 28.0* 18.4* 13.7*   INR 2.4 1.6 1.2     Lab Results   Component Value Date    PROTIME 13.7 10/10/2020    INR 1.2 10/10/2020       Last 3 Lipid Panel:  Recent Labs     10/10/20  0513   TRIG 123     No results found for: LDLCALC  No results found for: HDL  Lab Results   Component Value Date    TRIG 123 10/10/2020       ABGs:  Recent Labs     10/10/20  0527   PH 7.308*   PO2 95.7   PCO2 47.8*   HCO3 23.4   BE -2.9   O2SAT 96.8       Lactic Acid:  Recent Labs     10/10/20  0513   LACTA 2.2         Radiology:  RAD Results:  XR CHEST PORTABLE   Final Result   Increased right lower lobe infiltrate versus atelectasis and small pleural   effusion on the right. Unchanged left basilar airspace disease and small pleural effusion. US DUP LOWER EXTREMITIES BILATERAL VENOUS   Final Result   No evidence of DVT in either lower extremity. US DUP UPPER EXTREMITY LEFT VENOUS   Final Result   Occlusive thrombus left mid to proximal brachial vein.       The findings were sent to the Radiology Results [address] at 11:31   am on 10/9/2020to be communicated to a licensed caregiver. XR CHEST PORTABLE   Final Result   ET tube advancement. Right lower lobe atelectasis. XR CHEST PORTABLE   Final Result   No interval change         RADIOLOGY REPORT   Final Result      XR CHEST PORTABLE   Final Result   1. No interval change in the small left pleural effusion and left lung base   atelectasis. 2. No change in the minimal right lung base atelectasis. XR CHEST PORTABLE   Final Result   Opacities are present in left lung base which appear to have increased in   could suggest increasing pneumonia, atelectasis, or effusion. XR CHEST ABDOMEN NG PLACEMENT   Final Result   Satisfactory position of nasogastric tube. XR ABDOMEN (KUB) (SINGLE AP VIEW)   Final Result   Transverse, descending, and sigmoid colon mural thickening may reflect edema   and/or inflammation/infection, without significant interval change. Slight prominence of gas throughout the small intestine, without gross   distension, suggestive of paralytic ileus. Persisting left lung base opacity may be atelectasis with pleural effusion. Differential again includes infectious infiltrate         XR ABDOMEN (KUB) (SINGLE AP VIEW)   Final Result   Thickening of the transverse colon wall. Consistent with inflammation   identified on prior CT. US GALLBLADDER RUQ   Final Result   Cholelithiasis with positive sonographic Hopson's sign but no wall thickening   or pericholecystic inflammatory changes, altogether equivocal for   cholecystitis. Consider functional analysis with nuclear medicine HIDA scan. Hepatic steatosis. 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 Suspected small right pleural effusion. CT ABDOMEN PELVIS W IV CONTRAST Additional Contrast? None   Final Result   Addendum 1 of 1   ADDENDUM:   In the title of the examination, disregard the CT abdomen and pelvis. Final      CT ABDOMEN PELVIS W IV CONTRAST Additional Contrast? None   Final Result   Diffuse inflammation of the transverse colon, descending and rectosigmoid   colon with wall thickening and edema with active contrast   extravasation/bleeding. Hemorrhagic diverticulitis or colitis are   considered. There is some edema in the presacral area and tiny amount of air   collection in the rectovesical pouch. Walled-off microperforation is   considered. Distended gallbladder. Atelectasis/pleural effusion in the left lung base. RECOMMENDATIONS:   The clinical service was notified         XR CHEST PORTABLE   Final Result   Addendum 1 of 1   ADDENDUM:   In the title of the examination, disregard the CT abdomen and pelvis. Final      XR CHEST 1 VIEW   Final Result   Suspect left pleural effusion. No airspace consolidation. Dual-chamber   pacemaker in place. Follow-up PA and lateral radiographs may be helpful in   further evaluation. XR CHEST PORTABLE    (Results Pending)                 ASSESSMENT / PLAN:    1. Atrial fibrillation after coming off sotalol and significant stressors. Has spontaneously converted to sinus rhythm, and blood pressure stable. Stop PRN dig, and begin metoprolol IV every 6 hours with blood pressure hold parameters. 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 4 Thrombocytopenia/45,000 today. 5 ID-C. diff, toxic megacolon on flagyl-     6. Left arm DVT. Anticoagulation on hold due to thrombocytopenia and surgery.         Angelo Mera MD, [facility]  The [address] at [facility]    Electronically signed by Angelo Mera MD on 10/10/2020 at 11:15 AM

## 2020-10-10 NOTE — PROGRESS NOTES
[address] [place] [address] [facility]  Progress Note    SUBJECTIVE:  Chief Complaint   Patient presents with    Hypotension    Diarrhea     The patient is still in the ICU. She is still intubated and sedated. She is now down to 1 vasopressor. Back from the OR. Review of systems:  As stated above in the chief complaint, otherwise negative. Medications:  Scheduled Meds:   sodium chloride  20 mL Intravenous Once    potassium phosphate IVPB  30 mmol Intravenous Once    sodium chloride  20 mL Intravenous Once    sodium chloride  20 mL Intravenous Once    pantoprazole  40 mg Intravenous BID    sodium chloride flush  10 mL Intravenous BID    piperacillin-tazobactam  3.375 g Intravenous Q8H    chlorhexidine  15 mL Mouth/Throat BID    metroNIDAZOLE  500 mg Intravenous Q8H    [Held by provider] enoxaparin  30 mg Subcutaneous Daily     Continuous Infusions:   PN-Adult  3 IN 1 Central Line (Standard) 75 mL/hr at 10/09/20 1801    vasopressin (Septic Shock) infusion 0.02 Units/min (10/09/20 1450)    sodium chloride 12.5 mL/hr at 10/09/20 2309    norepinephrine Stopped (10/09/20 1226)    0.9% NaCl with KCl 20 mEq 100 mL/hr (10/10/20 0819)    fentaNYL 5 mcg/ml in 0.9%  ml infusion 75 mcg/hr (10/10/20 0734)    propofol       PRN Meds:digoxin, [DISCONTINUED] lubrifresh P.M. **AND** artificial tears    OBJECTIVE:  BP (!) 152/73   Pulse 94   Temp 97.8 °F (36.6 °C) (Oral)   Resp 16   Ht 5' 6\" (1.676 m)   Wt 231 lb 14.8 oz (105.2 kg)   SpO2 93%   BMI 37.43 kg/m²   Temp  Av °F (35.6 °C)  Min: 95.2 °F (35.1 °C)  Max: 98.4 °F (36.9 °C)  Constitutional: The patient is lying in bed in the ICU. She is intubated and sedated. Skin: Warm and dry. No rashes were noted. HEENT: Round and reactive pupils. Moist mucous membranes. No ulcerations or thrush. ET tube. OG tube. Neck: Supple to movements. Chest: No respiratory distress. Good breath sounds. No crackles.   Left difficile infection with colitis  · Fulminant colitis.   Status post total colectomy with abdominal washout and placement of wound VAC 10/7/2020  · Status post second look exploratory laparotomy, small bowel resection, and loop ileostomy and wound VAC 10/10/2020  · Peritonitis associated to C. difficile colitis  · Leukemoid reaction associated to fulminant colitis, improving  · History of UTI, being treated with Ceftriaxone at the nursing facility  · Acute kidney injury, improving    PLAN:  · Continue IV Metronidazole for now  · Continue Zosyn    Pam Pulidoa  9:50 AM  10/10/2020

## 2020-10-10 NOTE — OP NOTE
Operative Note    Gabbie Garcia  YOB: 1935  [id number]    Pre-operative Diagnosis: Open abdomen    Post-operative Diagnosis: Open abdomen    Procedure(s):  SECOND LOOK EXPLORATORY LAPAROTOMY, SMALL BOWEL RESECTION, END LOOP ILEOSTOMY CREATION,  APPLICATION OF SOFT TISSUE WOUND VAC >50 cm2    * No implants in log *      Surgeon:   Primary: Gricelda Houston MD    Staff:  Surgical Assistant: Archie Collet Person First: Naina Jamison    Anesthesia:   General    Estimated Blood Loss: less than 50     Complications: None    Specimens:   ID Type Source Tests Collected by Time Destination   A : TERMINAL ILEUM Tissue Tissue SURGICAL PATHOLOGY Gricelda Houston MD 10/10/2020 0815        Findings: See below. End loop ileostomy created with the medial part being the proximal limb      Indications: Patient is a 82 y.o. female who was diagnosed with fulminant C. difficile colitis and subsequently had perforation after initial improvement. She was taking previously for total colectomy. Due to the massive contamination as well as her being on pressors second look laparotomy was recommended for ileostomy creation and washout. Risks/Benefits/Alternatives were discussed with the patient, including bleeding, infection, iatrogenic injury, need for further surgery. The patient agreed to undergo the procedure and informed consent was obtained. Procedure: After informed consent, the patient was brought to the operating room and placed supine. General anesthesia was then induced which the patient tolerated well. Time out was performed to identify the correct patient and procedure. They received appropriate perioperative antibiotics. The abdomen was prepped and draped in the usual sterile fashion. The AB Thera wound VAC was removed. There was some turbid fluid throughout the abdomen which was suction. Irrigation was performed with clear effluent.   Small bowel was inspected and run from ligament of Treitz all the way to our resection area. There were no other abnormalities. There was some fibrinous tissue that was removed from the serosa of the bowel. The stomach was inspected and appeared unremarkable and the NG tube was in place. Rectal stump was identified and appeared intact. Ostomy site was chosen just to the right of the umbilicus. Skin was excised and some of the subcutaneous tissue was well. Blunt dissection was used to expose the rectus muscle. Cruciate incision was made through the anterior sheath. The muscle was split and the posterior sheath was also opened with cautery. It felt we would have difficulty bringing the ileostomy through the skin due to her large amount of subcutaneous fat with the resection location due to the short mesentery. We elected to excise about 6 cm of the terminal ileum which was done with the AMRIT stapler and the LigaSure device to create additional mesenteric length. We attempted to deliver it through the skin however it was still tight so at this point we decided to create an end loop ileostomy. Medial part of the ileostomy was the proximal limb. Mesenteric window was made and a 14 Togolese red rubber catheter was inserted underneath for support. Small bowel was then opened and the ileostomy treated in Brooking fashion. Red rubber catheter was then looped and secured with nylon suture for a bolster. We then placed a right-sided 19 Togolese drain on the right paracolic gutter and directed down towards the pelvis. We then placed a left-sided 23 Togolese drain into the left paracolic gutter and up around towards the spleen and liver. These were secured with nylon suture. The fascia was then closed with looped 0 PDS in a running fashion. Due to her contamination skin closure was avoided. Soft tissue wound VAC was then placed by fashioning the black sponge and applying the covering. It was hooked up to suction with good seal.  Area was greater than 50 cm².   Ostomy appliance was then applied. Counts were correct at the end of the case. Tolerated procedure well    Disposition: She will be transferred back to the intensive care unit for further critical care management. Continue TPN and await bowel function at this point. I was present and scrubbed for the procedure.     Marcella Mcwilliams MD  10/10/20  9:06 AM

## 2020-10-11 ENCOUNTER — APPOINTMENT (OUTPATIENT)
Dept: GENERAL RADIOLOGY | Age: 85
DRG: 853 | End: 2020-10-11
Payer: MEDICARE

## 2020-10-11 ENCOUNTER — APPOINTMENT (OUTPATIENT)
Dept: CT IMAGING | Age: 85
DRG: 853 | End: 2020-10-11
Payer: MEDICARE

## 2020-10-11 LAB
ALBUMIN SERPL-MCNC: 2 G/DL (ref 3.5–5.2)
ALP BLD-CCNC: 224 U/L (ref 35–104)
ALT SERPL-CCNC: 16 U/L (ref 0–32)
ANION GAP SERPL CALCULATED.3IONS-SCNC: 2 MMOL/L (ref 7–16)
ANISOCYTOSIS: ABNORMAL
APTT: 32.6 SEC (ref 24.5–35.1)
APTT: 74.5 SEC (ref 24.5–35.1)
APTT: 81.5 SEC (ref 24.5–35.1)
AST SERPL-CCNC: 43 U/L (ref 0–31)
B.E.: 1.6 MMOL/L (ref -3–0)
BASOPHILS ABSOLUTE: 0 E9/L (ref 0–0.2)
BASOPHILS RELATIVE PERCENT: 0 % (ref 0–2)
BILIRUB SERPL-MCNC: 1.1 MG/DL (ref 0–1.2)
BUN BLDV-MCNC: 28 MG/DL (ref 8–23)
CALCIUM IONIZED: 1.17 MMOL/L (ref 1.15–1.33)
CALCIUM SERPL-MCNC: 7.4 MG/DL (ref 8.6–10.2)
CALPROTECTIN, FECAL: >3000 UG/G
CHLORIDE BLD-SCNC: 110 MMOL/L (ref 98–107)
CO2: 27 MMOL/L (ref 22–29)
CREAT SERPL-MCNC: 0.7 MG/DL (ref 0.5–1)
DELIVERY SYSTEMS: ABNORMAL
DEVICE: ABNORMAL
DOHLE BODIES: ABNORMAL
EOSINOPHILS ABSOLUTE: 0 E9/L (ref 0.05–0.5)
EOSINOPHILS RELATIVE PERCENT: 0 % (ref 0–6)
FIO2 ARTERIAL: 40
GFR AFRICAN AMERICAN: >60
GFR NON-AFRICAN AMERICAN: >60 ML/MIN/1.73
GLUCOSE BLD-MCNC: 146 MG/DL (ref 74–99)
HAPTOGLOBIN: 187 MG/DL (ref 30–200)
HCO3 ARTERIAL: 26.3 MMOL/L (ref 22–26)
HCT VFR BLD CALC: 23.3 % (ref 34–48)
HCT VFR BLD CALC: 24.6 % (ref 34–48)
HEMOGLOBIN: 7.4 G/DL (ref 11.5–15.5)
HEMOGLOBIN: 7.8 G/DL (ref 11.5–15.5)
INR BLD: 1.1
LACTATE DEHYDROGENASE: 254 U/L (ref 135–214)
LYMPHOCYTES ABSOLUTE: 0.71 E9/L (ref 1.5–4)
LYMPHOCYTES RELATIVE PERCENT: 3.4 % (ref 20–42)
MAGNESIUM: 2 MG/DL (ref 1.6–2.6)
MCH RBC QN AUTO: 29.4 PG (ref 26–35)
MCHC RBC AUTO-ENTMCNC: 31.7 % (ref 32–34.5)
MCV RBC AUTO: 92.8 FL (ref 80–99.9)
METER GLUCOSE: 125 MG/DL (ref 74–99)
METER GLUCOSE: 157 MG/DL (ref 74–99)
METER GLUCOSE: 157 MG/DL (ref 74–99)
METER GLUCOSE: 169 MG/DL (ref 74–99)
MODE: AC
MONOCYTES ABSOLUTE: 0.71 E9/L (ref 0.1–0.95)
MONOCYTES RELATIVE PERCENT: 2.6 % (ref 2–12)
NEUTROPHILS ABSOLUTE: 22.18 E9/L (ref 1.8–7.3)
NEUTROPHILS RELATIVE PERCENT: 94 % (ref 43–80)
NUCLEATED RED BLOOD CELLS: 1.7 /100 WBC
O2 SATURATION: 99.3 % (ref 92–98.5)
OPERATOR ID: 2070
PCO2 ARTERIAL: 41.5 MMHG (ref 35–45)
PDW BLD-RTO: 19.9 FL (ref 11.5–15)
PH BLOOD GAS: 7.41 (ref 7.35–7.45)
PHOSPHORUS: 1.3 MG/DL (ref 2.5–4.5)
PLATELET # BLD: 44 E9/L (ref 130–450)
PLATELET CONFIRMATION: NORMAL
PMV BLD AUTO: 10.9 FL (ref 7–12)
PO2 ARTERIAL: 146.8 MMHG (ref 60–80)
POIKILOCYTES: ABNORMAL
POSITIVE END EXP PRESS: 5 CMH2O
POTASSIUM SERPL-SCNC: 5 MMOL/L (ref 3.5–5)
PROTHROMBIN TIME: 12.5 SEC (ref 9.3–12.4)
RBC # BLD: 2.65 E12/L (ref 3.5–5.5)
RESPIRATORY RATE: 16 B/MIN
SCHISTOCYTES: ABNORMAL
SODIUM BLD-SCNC: 139 MMOL/L (ref 132–146)
SOURCE, BLOOD GAS: ABNORMAL
TARGET CELLS: ABNORMAL
TEAR DROP CELLS: ABNORMAL
TIDAL VOLUME: 400 ML
TOTAL PROTEIN: 4.1 G/DL (ref 6.4–8.3)
TOXIC GRANULATION: ABNORMAL
WBC # BLD: 23.6 E9/L (ref 4.5–11.5)

## 2020-10-11 PROCEDURE — 2500000003 HC RX 250 WO HCPCS: Performed by: SURGERY

## 2020-10-11 PROCEDURE — 82330 ASSAY OF CALCIUM: CPT

## 2020-10-11 PROCEDURE — 2500000003 HC RX 250 WO HCPCS: Performed by: INTERNAL MEDICINE

## 2020-10-11 PROCEDURE — 36415 COLL VENOUS BLD VENIPUNCTURE: CPT

## 2020-10-11 PROCEDURE — 83615 LACTATE (LD) (LDH) ENZYME: CPT

## 2020-10-11 PROCEDURE — 6360000002 HC RX W HCPCS: Performed by: INTERNAL MEDICINE

## 2020-10-11 PROCEDURE — 85014 HEMATOCRIT: CPT

## 2020-10-11 PROCEDURE — 2500000003 HC RX 250 WO HCPCS: Performed by: STUDENT IN AN ORGANIZED HEALTH CARE EDUCATION/TRAINING PROGRAM

## 2020-10-11 PROCEDURE — 2580000003 HC RX 258: Performed by: SPECIALIST

## 2020-10-11 PROCEDURE — 2580000003 HC RX 258: Performed by: INTERNAL MEDICINE

## 2020-10-11 PROCEDURE — 2000000000 HC ICU R&B

## 2020-10-11 PROCEDURE — 82962 GLUCOSE BLOOD TEST: CPT

## 2020-10-11 PROCEDURE — 85610 PROTHROMBIN TIME: CPT

## 2020-10-11 PROCEDURE — 6360000002 HC RX W HCPCS: Performed by: SPECIALIST

## 2020-10-11 PROCEDURE — 6370000000 HC RX 637 (ALT 250 FOR IP): Performed by: STUDENT IN AN ORGANIZED HEALTH CARE EDUCATION/TRAINING PROGRAM

## 2020-10-11 PROCEDURE — 36592 COLLECT BLOOD FROM PICC: CPT

## 2020-10-11 PROCEDURE — 83010 ASSAY OF HAPTOGLOBIN QUANT: CPT

## 2020-10-11 PROCEDURE — 82803 BLOOD GASES ANY COMBINATION: CPT

## 2020-10-11 PROCEDURE — 36600 WITHDRAWAL OF ARTERIAL BLOOD: CPT

## 2020-10-11 PROCEDURE — 85730 THROMBOPLASTIN TIME PARTIAL: CPT

## 2020-10-11 PROCEDURE — 71045 X-RAY EXAM CHEST 1 VIEW: CPT

## 2020-10-11 PROCEDURE — 70450 CT HEAD/BRAIN W/O DYE: CPT

## 2020-10-11 PROCEDURE — 84100 ASSAY OF PHOSPHORUS: CPT

## 2020-10-11 PROCEDURE — 37799 UNLISTED PX VASCULAR SURGERY: CPT

## 2020-10-11 PROCEDURE — C9113 INJ PANTOPRAZOLE SODIUM, VIA: HCPCS | Performed by: INTERNAL MEDICINE

## 2020-10-11 PROCEDURE — 80053 COMPREHEN METABOLIC PANEL: CPT

## 2020-10-11 PROCEDURE — 85018 HEMOGLOBIN: CPT

## 2020-10-11 PROCEDURE — 85025 COMPLETE CBC W/AUTO DIFF WBC: CPT

## 2020-10-11 PROCEDURE — 86022 PLATELET ANTIBODIES: CPT

## 2020-10-11 PROCEDURE — 99233 SBSQ HOSP IP/OBS HIGH 50: CPT | Performed by: INTERNAL MEDICINE

## 2020-10-11 PROCEDURE — 83735 ASSAY OF MAGNESIUM: CPT

## 2020-10-11 PROCEDURE — 94003 VENT MGMT INPAT SUBQ DAY: CPT

## 2020-10-11 RX ORDER — FLUCONAZOLE 2 MG/ML
400 INJECTION, SOLUTION INTRAVENOUS EVERY 24 HOURS
Status: DISCONTINUED | OUTPATIENT
Start: 2020-10-11 | End: 2020-10-14

## 2020-10-11 RX ORDER — ARGATROBAN 1 MG/ML
0.5 INJECTION, SOLUTION INTRAVENOUS CONTINUOUS
Status: DISCONTINUED | OUTPATIENT
Start: 2020-10-11 | End: 2020-10-11 | Stop reason: SDUPTHER

## 2020-10-11 RX ORDER — ARGATROBAN 1 MG/ML
2 INJECTION, SOLUTION INTRAVENOUS CONTINUOUS
Status: DISCONTINUED | OUTPATIENT
Start: 2020-10-11 | End: 2020-10-11

## 2020-10-11 RX ORDER — ARGATROBAN 1 MG/ML
0.5 INJECTION, SOLUTION INTRAVENOUS CONTINUOUS
Status: DISCONTINUED | OUTPATIENT
Start: 2020-10-11 | End: 2020-10-12

## 2020-10-11 RX ORDER — PANTOPRAZOLE SODIUM 40 MG/10ML
40 INJECTION, POWDER, LYOPHILIZED, FOR SOLUTION INTRAVENOUS DAILY
Status: DISCONTINUED | OUTPATIENT
Start: 2020-10-12 | End: 2020-10-13

## 2020-10-11 RX ADMIN — METOPROLOL TARTRATE 2.5 MG: 5 INJECTION INTRAVENOUS at 12:00

## 2020-10-11 RX ADMIN — POTASSIUM PHOSPHATE, MONOBASIC AND POTASSIUM PHOSPHATE, DIBASIC 30 MMOL: 224; 236 INJECTION, SOLUTION, CONCENTRATE INTRAVENOUS at 11:55

## 2020-10-11 RX ADMIN — ARGATROBAN 0.5 MCG/KG/MIN: 50 INJECTION INTRAVENOUS at 21:11

## 2020-10-11 RX ADMIN — PIPERACILLIN SODIUM AND TAZOBACTAM SODIUM 3.38 G: 3; .375 INJECTION, POWDER, LYOPHILIZED, FOR SOLUTION INTRAVENOUS at 19:20

## 2020-10-11 RX ADMIN — Medication 10 ML: at 08:04

## 2020-10-11 RX ADMIN — METRONIDAZOLE 500 MG: 500 INJECTION, SOLUTION INTRAVENOUS at 11:54

## 2020-10-11 RX ADMIN — SODIUM CHLORIDE: 9 INJECTION, SOLUTION INTRAVENOUS at 15:55

## 2020-10-11 RX ADMIN — CHLORHEXIDINE GLUCONATE 0.12% ORAL RINSE 15 ML: 1.2 LIQUID ORAL at 08:04

## 2020-10-11 RX ADMIN — FLUCONAZOLE 400 MG: 2 INJECTION, SOLUTION INTRAVENOUS at 11:55

## 2020-10-11 RX ADMIN — SODIUM CHLORIDE: 9 INJECTION, SOLUTION INTRAVENOUS at 07:30

## 2020-10-11 RX ADMIN — ARGATROBAN 0.5 MCG/KG/MIN: 50 INJECTION INTRAVENOUS at 13:00

## 2020-10-11 RX ADMIN — PANTOPRAZOLE SODIUM 40 MG: 40 INJECTION, POWDER, FOR SOLUTION INTRAVENOUS at 08:04

## 2020-10-11 RX ADMIN — INSULIN LISPRO 1 UNITS: 100 INJECTION, SOLUTION INTRAVENOUS; SUBCUTANEOUS at 00:18

## 2020-10-11 RX ADMIN — METRONIDAZOLE 500 MG: 500 INJECTION, SOLUTION INTRAVENOUS at 03:32

## 2020-10-11 RX ADMIN — CHLORHEXIDINE GLUCONATE 0.12% ORAL RINSE 15 ML: 1.2 LIQUID ORAL at 21:11

## 2020-10-11 RX ADMIN — METRONIDAZOLE 500 MG: 500 INJECTION, SOLUTION INTRAVENOUS at 20:00

## 2020-10-11 RX ADMIN — METOPROLOL TARTRATE 2.5 MG: 5 INJECTION INTRAVENOUS at 18:10

## 2020-10-11 RX ADMIN — INSULIN LISPRO 1 UNITS: 100 INJECTION, SOLUTION INTRAVENOUS; SUBCUTANEOUS at 06:30

## 2020-10-11 RX ADMIN — FENTANYL CITRATE 25 MCG: 50 INJECTION, SOLUTION INTRAMUSCULAR; INTRAVENOUS at 02:12

## 2020-10-11 RX ADMIN — PIPERACILLIN SODIUM AND TAZOBACTAM SODIUM 3.38 G: 3; .375 INJECTION, POWDER, LYOPHILIZED, FOR SOLUTION INTRAVENOUS at 11:54

## 2020-10-11 RX ADMIN — POTASSIUM CHLORIDE: 2 INJECTION, SOLUTION, CONCENTRATE INTRAVENOUS at 18:02

## 2020-10-11 RX ADMIN — Medication 10 ML: at 21:11

## 2020-10-11 RX ADMIN — INSULIN LISPRO 1 UNITS: 100 INJECTION, SOLUTION INTRAVENOUS; SUBCUTANEOUS at 18:07

## 2020-10-11 RX ADMIN — METOPROLOL TARTRATE 2.5 MG: 5 INJECTION INTRAVENOUS at 00:16

## 2020-10-11 RX ADMIN — SODIUM CHLORIDE: 9 INJECTION, SOLUTION INTRAVENOUS at 23:25

## 2020-10-11 RX ADMIN — PIPERACILLIN SODIUM AND TAZOBACTAM SODIUM 3.38 G: 3; .375 INJECTION, POWDER, LYOPHILIZED, FOR SOLUTION INTRAVENOUS at 03:32

## 2020-10-11 ASSESSMENT — PULMONARY FUNCTION TESTS
PIF_VALUE: 20
PIF_VALUE: 21
PIF_VALUE: 20
PIF_VALUE: 23
PIF_VALUE: 22
PIF_VALUE: 21
PIF_VALUE: 27
PIF_VALUE: 20
PIF_VALUE: 24
PIF_VALUE: 21
PIF_VALUE: 19
PIF_VALUE: 20
PIF_VALUE: 22
PIF_VALUE: 20
PIF_VALUE: 25
PIF_VALUE: 20
PIF_VALUE: 16
PIF_VALUE: 20

## 2020-10-11 ASSESSMENT — PAIN SCALES - GENERAL
PAINLEVEL_OUTOF10: 0
PAINLEVEL_OUTOF10: 7
PAINLEVEL_OUTOF10: 0

## 2020-10-11 NOTE — PATIENT CARE CONFERENCE
Intensive Care Daily Quality Rounding Checklist        ICU Team Members: bedside nurse, charge nurse, rt, dr delgadillo/     ICU Day #: NUMBER: 4     Intubation Date: October 7     Ventilator Day #: NUMBER: 4     Central Line Insertion Date: October 4                                                    Day #: NUMBER: 7      Arterial Line Insertion Date: October 7                             Day #: NUMBER: 4     DVT Prophylaxis: Lovenox     GI Prophylaxis: Protonix     Martin Catheter Insertion Date: October 4                                        Day #: 7                             Continued need (if yes, reason documented and discussed with physician): yes, strict I&o        Skin Issues/ Wounds and ordered treatment discussed on rounds: y     Goals/ Plans for the Day: Monitor vitals and labs, wean vent as sy.  Head CT, TPN ordered and electrolyte replacement

## 2020-10-11 NOTE — PROGRESS NOTES
Absent bowel sounds. Right ileostomy. 2 JULISSA drains with serosanguineous fluid. Extremities: Minimal edema. Lines: Right IJ TLC 10/4/2020. Right midline 10/7/2020. Left radial arterial line 10/7/2020. Martin catheter with clear urine. Laboratory and Tests Review:  Lab Results   Component Value Date    WBC 23.6 (H) 10/11/2020    WBC 21.2 (H) 10/10/2020    WBC 29.9 (H) 10/10/2020    HGB 7.8 (L) 10/11/2020    HCT 24.6 (L) 10/11/2020    MCV 92.8 10/11/2020    PLT 44 (L) 10/11/2020     Lab Results   Component Value Date    NEUTROABS 22.18 (H) 10/11/2020    NEUTROABS 19.72 (H) 10/10/2020    NEUTROABS 22.90 (H) 10/10/2020     No results found for: [facility]  Lab Results   Component Value Date    ALT 16 10/11/2020    AST 43 (H) 10/11/2020    ALKPHOS 224 (H) 10/11/2020    BILITOT 1.1 10/11/2020     Lab Results   Component Value Date     10/11/2020    K 5.0 10/11/2020    K 3.8 10/09/2020     10/11/2020    CO2 27 10/11/2020    BUN 28 10/11/2020    CREATININE 0.7 10/11/2020    CREATININE 0.7 10/10/2020    CREATININE 0.9 10/09/2020    GFRAA >60 10/11/2020    LABGLOM >60 10/11/2020    GLUCOSE 146 10/11/2020    PROT 4.1 10/11/2020    LABALBU 2.0 10/11/2020    CALCIUM 7.4 10/11/2020    BILITOT 1.1 10/11/2020    ALKPHOS 224 10/11/2020    AST 43 10/11/2020    ALT 16 10/11/2020     Lab Results   Component Value Date    CRP 14.3 (H) 10/06/2020    CRP 17.5 (H) 10/05/2020     Lab Results   Component Value Date    SEDRATE 6 10/06/2020    SEDRATE 10 10/05/2020     Radiology:  Chest x-ray reviewed    Microbiology:   Nares screen for MRSA: Negative  Urine culture 10/4/2020 <10,000 E. coli, Corynebacterium, yeast  Blood cultures 10/4/2020: Negative so far  Stools for C. difficile: Positive  Peritoneal fluid culture 10/7/2020: E. coli, Candida albicans    No results for input(s): PROCAL in the last 72 hours. ASSESSMENT:  · Severe C. difficile infection with colitis  · Fulminant colitis.   Status post total colectomy with abdominal washout and placement of wound VAC 10/7/2020  · Status post second look exploratory laparotomy, small bowel resection, and loop ileostomy and wound VAC 10/10/2020  · Peritonitis associated to C. difficile colitis with perforation  · Leukemoid reaction associated to fulminant colitis, improving  · History of UTI, being treated with Ceftriaxone at the nursing facility  · Acute kidney injury, improving    PLAN:  · Continue Zosyn  · Stop Metronidazole  · Start Fluconazole    Marvetta Severs  8:46 AM  10/11/2020

## 2020-10-11 NOTE — PROGRESS NOTES
Pharmacy Consultation Note  (Argatroban Dosing and Monitoring)    Initial consult date: 10/11  Consulting physician: Savannah    Allergies:  Codeine    Ht Readings from Last 1 Encounters:   10/09/20 5' 6\" (1.676 m)     Wt Readings from Last 1 Encounters:   10/11/20 237 lb 14 oz (107.9 kg)       Argatroban Checklist:  · Heparin/LMWH Products D/C'd? Yes  · Hx of HIT/heparin allergy? No  · Evidence of thrombosis? Yes   · Site: Proximal left brachial vein dvt  · Heparin Antibody ordered? Yes   · HIT Ab level:    4 T's for HIT  Thrombocytopenia Score   Platelet count fall >65% and platelet jun >34 2   Platelet count fall 30 - 50% OR platelet jun 10 - 19    Platelet count fall <59% OR platelet jun <25    Timing    Clear onset with days 5 - 10 or platelet fall <1 day (prior heparin exposure within 30 days ago) 2   Consistent with days 5 - 10 but not clear; onset after day 10 or fall <1 day (prior heparin exposure 30 - 100 days ago)    Platelet count fall <4 days without recent exposure    Thrombosis    New thrombosis or skin necrosis 2   Progressive or recurrent thrombosis    None    Other Causes    None apparent    Possible 1   Definite    Total 7          Baseline LFTs:  Date 10/11   Albumin 2.0   Bili (Direct)    Bili (Total) 1.1   ALT/AST 16/43   Alk Phos 224     Baseline Coagulation Studies:  Date 10/11   aPTT 32.6   INR 1.1     Goal aPTT: 45-85    Date aPTT  /Time Rate (mCg/kg/min) Rate Change / Time INR PLT Comment   10/11 32.6/0600  74.5/1515  81.5/1920 0.5  0.5  0.5 1300  1723  1954 1.1 44    10/12 <0600>                                     Assessment and Plan:  · 85yof admitted to [facility] after recent admission at [facility] on 10/4 with septic shock and UGIB. · On 10/7, patients platelets dropped; patient only received 2 doses of Lovenox 30mg during this admission - Lovenox was held at this time  · Patient was found to have a potentially new DVT.  HIT antibody was sent and 4T score was evaluated showing patient may possibly have HIT  · Argatroban started at low-dose d/t fragility and instability of patient. Continue rate as outlined in the nomogram.  · Re-check aPTT 3 hours after start of infusion or after rate changes  · Pharmacist will follow and monitor/adjust dosing as necessary    Thank you for this consult. Please page with questions.     Dee Dee Card PharmD, BCPS 10/11/2020 12:04 PM   Ext: [phone]    Addendum:    · Patient has had 2 consecutive therapeutic aPTT's at a rate of 0.5 mcg/kg/min (74.5 and 81.5 sec)  · Will change aPTT order to daily starting tomorrow @ 0600   · Clinical pharmacy will continue to follow closely    Antoinette Fletcher PharmD, BCPS 10/11/2020 8:03 PM   [phone]

## 2020-10-11 NOTE — PROGRESS NOTES
Critical Care Team - Daily Progress Note      Date and time: 10/11/2020 9:51 AM  Patient's name:  Kelin Oliver  Medical Record Number: [id number]  Patient's account/billing number: [de-identified]  Patient's YOB: 1935  Age: 82 y.o. Date of Admission: 10/4/2020  4:26 PM  Length of stay during current admission: 7      Primary Care Physician: Monserrat Clayton MD  ICU Attending Physician: Dr. Duane Euceda Status: Limited    Reason for ICU admission: septic shock, c.diff colitis   post operative respiratory failure requiring Mechanical Ventilation s/p total colectomy       SUBJECTIVE:   The patient is a 82 y. o. female with significant past medical history of A-fib, HTN, and anemia that presented to the ER due to hypotension and diarrhea. Patient was recently diagnosed with UTI and was placed on antibiotics. Patient has been having diarrhea for a few weeks. Patient has been having diffuse cramping abdominal pain with no radiation. Patient was found to be guaiac positive in the ER. Patient was transferred out of the ICU on 10/06. On 10/07, patient had an increase in abdominal pain and peritonitis, so she was brought to the OR. Patient underwent a total colectomy with abthera open abdominal dressing. Patient was re-admitted to the ICU due to need for mechanical ventilation.         OVERNIGHT EVENTS:          Off pressors off sedation   Minimal non purposeful eye movement     Drain output:   Minimal output from wound vac   JULISSA drain #1 315  JULISSA drain #2 1010       AWAKE & FOLLOWING COMMANDS:  [x] No   [] Yes    CURRENT VENTILATION STATUS:     [x] Ventilator  [] BIPAP  [] Nasal Cannula [] Room Air      IF INTUBATED, ET TUBE MARKING AT LOWER LIP:       cms    SECRETIONS Amount:  [] Small [] Moderate  [] Large  [x] None  Color:     [] White [] Colored  [] Bloody    SEDATION:  RAAS Score:   [] Fentanyl [] Versed gtt  [] Ativan gtt   [] No Sedation    PARALYZED:  [x] No    [] Yes    DIARRHEA: Comments: Intubated, not following commands;  RASS -5  HENT:      Head: Normocephalic. Mouth/Throat:      Mouth: Mucous membranes are dry. Eyes:      Pupils: Pupils are equal, round, and reactive to light. Cardiovascular:      Rate and Rhythm sinus rhythm      Heart sounds: Normal heart sounds. No murmur. Pulmonary:      Breath sounds: Rhonchi present. Abdominal:      Palpations: Abdomen is soft. Comments: Open, packed, dressing bloody; wound vac in place drained 1800 cc total   Musculoskeletal:      Right lower le+ Edema present. Left lower le+ Edema present. Skin:     Capillary Refill: Capillary refill takes 2-3 seconds. Coloration: Skin is pale.  Bluing of the fingers noted   Neurological:  CN II-XII symmetrical   Any additional physical findings:    MEDICATIONS:    Scheduled Meds:   potassium phosphate IVPB  30 mmol Intravenous Once    fluconazole  400 mg Intravenous Q24H    sodium chloride  20 mL Intravenous Once    metoprolol  2.5 mg Intravenous Q6H    insulin lispro  0-6 Units Subcutaneous Q6H    sodium chloride  20 mL Intravenous Once    sodium chloride  20 mL Intravenous Once    pantoprazole  40 mg Intravenous BID    sodium chloride flush  10 mL Intravenous BID    piperacillin-tazobactam  3.375 g Intravenous Q8H    chlorhexidine  15 mL Mouth/Throat BID    metroNIDAZOLE  500 mg Intravenous Q8H     Continuous Infusions:   PN-Adult  3 IN 1 Central Line (Standard)      PN-Adult  3 IN 1 Central Line (Standard) 75 mL/hr at 10/11/20 0000    vasopressin (Septic Shock) infusion 0.02 Units/min (10/09/20 1450)    sodium chloride 12.5 mL/hr at 10/11/20 0730    norepinephrine Stopped (10/09/20 1226)     PRN Meds:   fentanNYL, 25 mcg, Q4H PRN  artificial tears, , PRN          VENT SETTINGS (Comprehensive) (if applicable):  Vent Information  $Ventilation: $Subsequent Day  Equipment ID: [id number]  Equipment Changed: Suction catheter  Vent Type: [id number]  Vent Mode: AC/VC  Vt Ordered: 400 mL  Rate Set: 16 bmp  Peak Flow: 55 L/min  Pressure Support: 0 cmH20  FiO2 : 40 %  SpO2: 97 %  SpO2/FiO2 ratio: 242.5  Sensitivity: 3  PEEP/CPAP: 5  I Time/ I Time %: 0 s  Humidification Source: HME  Additional Respiratory  Assessments  Pulse: 104  Resp: 17  SpO2: 97 %  Position: Semi-Forbes's  Humidification Source: HME  Oral Care: Mouth swabbed, Mouth suctioned, Suction toothette  Subglottic Suction Done?: No  Cuff Pressure (cm H2O): 29 cm H2O    ABGs:     Recent Labs     10/10/20  0527 10/11/20  0558   PH 7.308* 7.411   PCO2 47.8*  --    PO2 95.7  --    HCO3 23.4  --    BE -2.9 1.6*   O2SAT 96.8 99.3*         Laboratory findings:    Complete Blood Count:   Recent Labs     10/10/20  0513 10/10/20  1900 10/11/20  0600   WBC 29.9* 21.2* 23.6*   HGB 7.7* 7.6* 7.8*   HCT 24.0* 23.9* 24.6*   PLT 45* 41* 44*        Last 3 Blood Glucose:   Recent Labs     10/09/20  0515 10/10/20  0513 10/11/20  0600   GLUCOSE 159* 174* 146*        PT/INR:    Lab Results   Component Value Date    PROTIME 12.5 10/11/2020    INR 1.1 10/11/2020     PTT:    Lab Results   Component Value Date    APTT 32.6 10/11/2020       Comprehensive Metabolic Profile:   Recent Labs     10/09/20  0515 10/10/20  0513 10/11/20  0600    138 139   K 3.8  3.8 4.0 5.0   * 109* 110*   CO2 21* 26 27   BUN 29* 27* 28*   CREATININE 0.9 0.7 0.7   GLUCOSE 159* 174* 146*   CALCIUM 6.4* 7.0* 7.4*   PROT 3.3* 4.2* 4.1*   LABALBU 1.7* 2.3* 2.0*   BILITOT 1.7* 1.6* 1.1   ALKPHOS 116* 175* 224*   AST 26 27 43*   ALT 11 13 16      Magnesium:   Lab Results   Component Value Date    MG 2.0 10/11/2020     Phosphorus:   Lab Results   Component Value Date    PHOS 1.3 10/11/2020     Ionized Calcium:   Lab Results   Component Value Date    CAION 1.17 10/11/2020        Urinalysis:     Troponin: No results for input(s): TROPONINI in the last 72 hours.     Microbiology:    Cultures during this admission:     Nares screen for MRSA: Negative  Urine culture 10/4/2020 <10,000 E. coli, Corynebacterium, yeast  Blood cultures 10/4/2020: Negative so far  Stools for C. difficile: Positive  Peritoneal fluid culture 10/7/2020: E. coli, Candida albicans    Other pertinent Labs:   C-Diff +    Radiology/Imaging:   Chest Xray (10/11/2020):       ASSESSMENT:     Neuro     Acute Metabolic encephalopathy - ? Residual medication effects from pain and sedation. Concern for intracranial process check stat ct head wo contrast      all sedation held > 24 hours      Respiratory   Acute hypoxic respiratory failure requiring Mechanical ventilation                        -Wean as tolerated             -CXR daily             - bronchodilators    -  VAP prophylaxis                   Cardiovascular   Currently sinus rhythm   Septic shock resolved. A-fib rate controlled   Metoprolol 2.5 mg iv q 6 hours. Stop digoxin             -Cardiology following.       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    Fulminant Colitis  S/p total colectomy on 10/07 - wound vac in place (2) jpeg drains   - monitor output     - TPN via iv      -             -General surgery following     hypoalbuminemia secondary to sepsis       Protonix bid      Renal   QI - resolved   Metabolic alkalosis secondary to TPN  Acetate adjusted     Replace phosphorus   Monitor urine output     Corrected calcium for hypoalbuminemia is adequate      Infectious Disease   Septic Shock - resolved   Likely due to c,diff colitis with toxic megacolon   Acute peritonitis secondary to ruptured viscous   Improving              -WBC worsening; WBC 54.0              -On Zosyn day 2, Flagyl day 3       -Blood cultures show no growth in 24 hours     Fulminant Colitis   Hx of Ulcerative colitis  S/p total colectomy on 10/07              -C-diff positive               -On Flagyl IV day 5, Zosyn day 4          Hematology/Oncology   Anemia - secondary to surgery and chronic disease     Thrombocytopenia with Proximal left brachial vein dvt  Concern for HIT, 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 Monitor cbc        Endocrine     Blood glucose between 140-180  Insulin ss      Social/Spiritual/DNR/Other   Limited code         Prognosis guarded patient is critically ill     Michelle Lugo M.D.    Pulmonary/Critical Care Medicine   48 min cct excluding procedures

## 2020-10-11 NOTE — PROGRESS NOTES
Department of Internal Medicine  Nephrology Attending Progress Note      Reason for Consult:   QI  Requesting Physician:  Dr Richardson Rose:  weakness    History Obtained From:  patient, family member - daughter    Sub: Patient seen and examined at bedside in the ICU  Events over the last 24 hours reviewed Now started on argatroban  Tolerating TPN          Past Medical History:        Diagnosis Date    Anemia     Atrial fibrillation ([address]     Colitis     Hypertension        Current Medications:    Current Facility-Administered Medications: PN-Adult  3-in-1 Central Line (Standard), , Intravenous, Continuous TPN  fluconazole (DIFLUCAN) in 0.9 % sodium chloride IVPB 400 mg, 400 mg, Intravenous, Q24H  [START ON 10/12/2020] pantoprazole (PROTONIX) injection 40 mg, 40 mg, Intravenous, Daily  argatroban infusion 50mg in 0.9% sodium chloride 50 mL (premix), 0.5 mcg/kg/min, Intravenous, Continuous  0.9 % sodium chloride bolus, 20 mL, Intravenous, Once  metoprolol (LOPRESSOR) injection 2.5 mg, 2.5 mg, Intravenous, Q6H  fentaNYL (SUBLIMAZE) injection 25 mcg, 25 mcg, Intravenous, Q4H PRN  insulin lispro (HUMALOG) injection vial 0-6 Units, 0-6 Units, Subcutaneous, Q6H  PN-Adult  3 IN 1 Central Line (Standard), , Intravenous, Continuous TPN  0.9 % sodium chloride bolus, 20 mL, Intravenous, Once  0.9 % sodium chloride bolus, 20 mL, Intravenous, Once  sodium chloride flush 0.9 % injection 10 mL, 10 mL, Intravenous, BID  piperacillin-tazobactam (ZOSYN) 3.375 g in dextrose 5 % 50 mL IVPB extended infusion (mini-bag), 3.375 g, Intravenous, Q8H  0.9 % sodium chloride infusion, , Intravenous, Q8H  norepinephrine (LEVOPHED) 16 mg in dextrose 5 % 250 mL infusion, 2 mcg/min, Intravenous, Continuous  [DISCONTINUED] lubrifresh P.M. (artificial tears) ophthalmic ointment 0.5 inch, 1 applicator, Both Eyes, PRN **AND** lubrifresh P.M. (artificial tears) ophthalmic ointment, , Both Eyes, PRN  chlorhexidine (PERIDEX) 0.12 % solution 15 mL, 15 mL, Mouth/Throat, BID  metronidazole (FLAGYL) 500 mg in NaCl 100 mL IVPB premix, 500 mg, Intravenous, Q8H  Allergies:  Codeine    Social History:    TOBACCO:  Never used tobacco  ETOH:  Never drank alcohol  DRUGS:  Never used recreational drugs    Family History:   History reviewed. No pertinent family history.     PHYSICAL EXAM:      Vitals:    VITALS:  BP (!) 115/58   Pulse 120   Temp 98.6 °F (37 °C) (Axillary)   Resp 18   Ht 5' 6\" (1.676 m)   Wt 237 lb 14 oz (107.9 kg)   SpO2 97%   BMI 38.39 kg/m²   24HR BLOOD PRESSURE RANGE:  Systolic (48XGE), JCJ:949 , Min:106 , ZKC:619   ; Diastolic (58VFE), KHE:04, Min:51, Max:60    24HR INTAKE/OUTPUT:      Intake/Output Summary (Last 24 hours) at 10/11/2020 1752  Last data filed at 10/11/2020 1400  Gross per 24 hour   Intake 2315 ml   Output 2295 ml   Net 20 ml       Constitutional:  Well developed and nourished , no acute distress, intubated and sedated, seen in the ICU, Fio2 at 40 %  HEENT:  NC, PERRL, oral mucosa dry , neck supple, no JVD  Respiratory:  Clear bilaterally except decreased BS at bases  Cardiovascular/Edema: RRR, s1,s2 no gallop  Gastrointestinal:  Wound vac in place, caraballo in place draining concentrated urine  Neurologic:  Alert and Ox 3 , nonfocal  Skin:  Decreased turgor  Other:  No rash    DATA:    CBC:   Lab Results   Component Value Date    WBC 23.6 10/11/2020    RBC 2.65 10/11/2020    HGB 7.8 10/11/2020    HCT 24.6 10/11/2020    MCV 92.8 10/11/2020    MCH 29.4 10/11/2020    MCHC 31.7 10/11/2020    RDW 19.9 10/11/2020    PLT 44 10/11/2020    MPV 10.9 10/11/2020     CMP:    Lab Results   Component Value Date     10/11/2020    K 5.0 10/11/2020    K 3.8 10/09/2020     10/11/2020    CO2 27 10/11/2020    BUN 28 10/11/2020    CREATININE 0.7 10/11/2020    GFRAA >60 10/11/2020    LABGLOM >60 10/11/2020    GLUCOSE 146 10/11/2020    PROT 4.1 10/11/2020    LABALBU 2.0 10/11/2020    CALCIUM 7.4 10/11/2020    BILITOT 1.1 10/11/2020 ALKPHOS 224 10/11/2020    AST 43 10/11/2020    ALT 16 10/11/2020     Magnesium:    Lab Results   Component Value Date    MG 2.0 10/11/2020     Phosphorus:    Lab Results   Component Value Date    PHOS 1.3 10/11/2020     Uric Acid:  No results found for: Adela Bilberry  U/A:    Lab Results   Component Value Date    COLORU CASEY 10/04/2020    PROTEINU TRACE 10/04/2020    PHUR 5.0 10/04/2020    WBCUA >20 10/04/2020    RBCUA NONE 10/04/2020    BACTERIA FEW 10/04/2020    CLARITYU TURBID 10/04/2020    SPECGRAV 1.025 10/04/2020    LEUKOCYTESUR MODERATE 10/04/2020    UROBILINOGEN 0.2 10/04/2020    BILIRUBINUR MODERATE 10/04/2020    BLOODU Negative 10/04/2020    GLUCOSEU Negative 10/04/2020     Radiology Review:    Atelectasis and pleural effusion in the lung bases more on the left side.         Right IJ central line has noted without complications.           Problems resolved:       IMPRESSION/RECOMMENDATIONS:      Gabbie Garcia is a 82 y.o. female with significant past medical history of Colitis, diarrhea, blood in stool, A-Fib, anemia, and HTN admitted via ED for hypotension and diarrhea. Pt with SBP in 80's in ED, she was treated with IV fluids. Pt has been having diarrhea for > 3 months stating stools are dark with red blood associated nausea, vomiting, and poor oral intake. Daughter reports that patient was discharged from [facility] and was living with her. Patient got progressively worse therefore she brought her to ED. Initial labs done showed serum sodium 126 and creatinine of 1.3 mg/dl yesterday which worsened to 122 and 1.6 mg/dl respectively therefore this consultation was ordered. 1. QI secondary to volume responsive prerenal state versus ischemic ATN    2. Hyponatremia probably secondary to GI losses  3. C. Difficile colitis, with evidence of fulminant colitis with multiple perforations, s/p total colectomy  4.  High anion gap metabolic acidosis secondary to lactic acidosis with severe hypotension, hypoperfusion, PH>7.2  5. Anemia : on Ferrous sulfate,   6. Severe hypoalbuminemia/malnutrition  7. Hypocalcemia: Corrected calcium for hypoalbuminemia is 7.7mg/dL  8.  Nutrition:NPO      PLAN:    · QI, oliguric secondary to severe ischemic ATN from profound hypotension, UO is now picking up  · C/W TPN  · K phos 30 mmol IV  · Monitor K levels closely  · Antibiotics per ID recommendation      Discussed with her son present at bedside      Morgan Noriega MD

## 2020-10-11 NOTE — PROGRESS NOTES
10/09/20  2326 10/10/20  0513 10/10/20  1900   WBC 29.8* 29.9* 21.2*   RBC 2.62* 2.60* 2.58*   HGB 7.8* 7.7* 7.6*   HCT 23.9* 24.0* 23.9*   MCV 91.2 92.3 92.6   MCH 29.8 29.6 29.5   MCHC 32.6 32.1 31.8*   RDW 19.2* 19.4* 19.6*   PLT 29* 45* 41*   MPV 10.5 9.7 10.2       CBC with Differential:    Lab Results   Component Value Date    WBC 21.2 10/10/2020    RBC 2.58 10/10/2020    HGB 7.6 10/10/2020    HCT 23.9 10/10/2020    PLT 41 10/10/2020    MCV 92.6 10/10/2020    MCH 29.5 10/10/2020    MCHC 31.8 10/10/2020    RDW 19.6 10/10/2020    NRBC 0.9 10/09/2020    METASPCT 4.3 10/09/2020    LYMPHOPCT 4.1 10/10/2020    PROMYELOPCT 3.0 10/08/2020    MONOPCT 2.5 10/10/2020    MYELOPCT 1.7 10/09/2020    BASOPCT 0.5 10/10/2020    MONOSABS 0.74 10/10/2020    LYMPHSABS 1.21 10/10/2020    EOSABS 0.01 10/10/2020    BASOSABS 0.16 10/10/2020     CMP:    Lab Results   Component Value Date     10/10/2020    K 4.0 10/10/2020    K 3.8 10/09/2020     10/10/2020    CO2 26 10/10/2020    BUN 27 10/10/2020    CREATININE 0.7 10/10/2020    GFRAA >60 10/10/2020    LABGLOM >60 10/10/2020    GLUCOSE 174 10/10/2020    PROT 4.2 10/10/2020    LABALBU 2.3 10/10/2020    CALCIUM 7.0 10/10/2020    BILITOT 1.6 10/10/2020    ALKPHOS 175 10/10/2020    AST 27 10/10/2020    ALT 13 10/10/2020     Ionized Calcium:  No results found for: IONCA  Magnesium:    Lab Results   Component Value Date    MG 2.0 10/10/2020     Phosphorus:    Lab Results   Component Value Date    PHOS 1.3 10/10/2020     PT/INR:    Lab Results   Component Value Date    PROTIME 13.7 10/10/2020    INR 1.2 10/10/2020     PTT:    Lab Results   Component Value Date    APTT 35.4 10/10/2020   [APTT}     Radiology:   XR CHEST PORTABLE   Final Result   Increased right lower lobe infiltrate versus atelectasis and small pleural   effusion on the right. Unchanged left basilar airspace disease and small pleural effusion.          US DUP LOWER EXTREMITIES BILATERAL VENOUS   Final Result No evidence of DVT in either lower extremity. US DUP UPPER EXTREMITY LEFT VENOUS   Final Result   Occlusive thrombus left mid to proximal brachial vein. The findings were sent to the Radiology Results [id number] at 11:31   am on 10/9/2020to be communicated to a licensed caregiver. XR CHEST PORTABLE   Final Result   ET tube advancement. Right lower lobe atelectasis. XR CHEST PORTABLE   Final Result   No interval change         RADIOLOGY REPORT   Final Result      XR CHEST PORTABLE   Final Result   1. No interval change in the small left pleural effusion and left lung base   atelectasis. 2. No change in the minimal right lung base atelectasis. XR CHEST PORTABLE   Final Result   Opacities are present in left lung base which appear to have increased in   could suggest increasing pneumonia, atelectasis, or effusion. XR CHEST ABDOMEN NG PLACEMENT   Final Result   Satisfactory position of nasogastric tube. XR ABDOMEN (KUB) (SINGLE AP VIEW)   Final Result   Transverse, descending, and sigmoid colon mural thickening may reflect edema   and/or inflammation/infection, without significant interval change. Slight prominence of gas throughout the small intestine, without gross   distension, suggestive of paralytic ileus. Persisting left lung base opacity may be atelectasis with pleural effusion. Differential again includes infectious infiltrate         XR ABDOMEN (KUB) (SINGLE AP VIEW)   Final Result   Thickening of the transverse colon wall. Consistent with inflammation   identified on prior CT. US GALLBLADDER RUQ   Final Result   Cholelithiasis with positive sonographic Hopson's sign but no wall thickening   or pericholecystic inflammatory changes, altogether equivocal for   cholecystitis. Consider functional analysis with nuclear medicine HIDA scan. Hepatic steatosis. 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 Suspected small right pleural effusion. CT ABDOMEN PELVIS W IV CONTRAST Additional Contrast? None   Final Result   Addendum 1 of 1   ADDENDUM:   In the title of the examination, disregard the CT abdomen and pelvis. Final      CT ABDOMEN PELVIS W IV CONTRAST Additional Contrast? None   Final Result   Diffuse inflammation of the transverse colon, descending and rectosigmoid   colon with wall thickening and edema with active contrast   extravasation/bleeding. Hemorrhagic diverticulitis or colitis are   considered. There is some edema in the presacral area and tiny amount of air   collection in the rectovesical pouch. Walled-off microperforation is   considered. Distended gallbladder. Atelectasis/pleural effusion in the left lung base. RECOMMENDATIONS:   The clinical service was notified         XR CHEST PORTABLE   Final Result   Addendum 1 of 1   ADDENDUM:   In the title of the examination, disregard the CT abdomen and pelvis. Final      XR CHEST 1 VIEW   Final Result   Suspect left pleural effusion. No airspace consolidation. Dual-chamber   pacemaker in place. Follow-up PA and lateral radiographs may be helpful in   further evaluation. XR CHEST PORTABLE    (Results Pending)       Assessment:    Active Problems:    Septic shock (HCC)    Hyponatremia    Diarrhea    UTI (urinary tract infection) with pyuria    Anemia    Leukocytosis    Acidosis    Hypotension    Sick sinus syndrome (HCC)    Cardiac pacemaker in situ  Resolved Problems:    * No resolved hospital problems. *      Plan:  1. Hypotension (septic shock vs hypovolemia)  intensivist following. Pressors per intensivist.   2. sepsis(leukocytosis, RR noted to go up to 21 and 33, infection)POA supportive care and tx underlying infection  3. uti possibly due to catheter treatment started as outpt and continued here for now. ID following. 4. C.  Diff colitis, fuminant/toxic megacolon  abx per ID   Surgery following. Gi following. 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 Peritonitis associated with c. Diff colitis  abx per ID and surgery following  6. UC less likely   bx reviewed and appears to be idiopathic colitis on bx. D/w gi. Pseudomembranous colitis likely and recommended surgical consult with notation of microperforation. Gi following peripherally  7. Hyponatremia improved monitor  8. mary renal following  Fluids per renal. improving  9.  leukocytosis possibly multifactorial monitor closely  10. Atelectasis contributing to leukocytosis  monitor  11. Anemia likely with acute blood loss component monitor and transfuse prn  12. Acidosis/lactic acidosis monitor  13. Hypoalbuminemia edema likely due to third spacing  14. Pleural effusion monitor low albumin may also be contributing  15. Atrial fib monitor and tx. Cardiology on consult  16. htn stop lisinopril  Monitor bp  17. LUE dvt  Monitor pt closely  18. Hypokalemia monitor and replace prn  19. Hypophosphatemia monitor and replace prn  20.  Thrombocytopenia monitor and replace prn      Chart reviewed and updated by nursing    Time spent is 35 min      Electronically signed by David Post DO on 10/10/2020 at 8:00 PM

## 2020-10-11 NOTE — PROGRESS NOTES
Surgery:    Ostomy with bowel sweat. Pressors remain off. 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 Right JULISSA slightly bloody, left JULISSA serosanguineous. Wound VAC in place. Continue TPN. CT head noted. Plan for her Argatroban per ICU if okay with neurology, discussed with Dr. Desiree Peterson and son. Hopefully risk of bleeding diminished being >24 hours postop, but still a risk which will need monitored.       Aliyah Marti MD  10/11/20  1:02 PM

## 2020-10-11 NOTE — PROGRESS NOTES
Patient seen and examined. Consult note dictated. 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 Orders placed for LDH, haptoglobin and PF4. We will continue following with you. Thank you very much for allowing us to precipitate in her care.

## 2020-10-11 NOTE — PROGRESS NOTES
[facility] Hospitalist   Progress Note    Admitting Date and Time: 10/4/2020  4:26 PM  Admit Dx: Septic shock ([facility] [address] [A41.9, R65.21]  Septic shock ([address] [A41.9, R65.21]    Subjective:    Patient was admitted with Septic shock ([address] [A41.9, R65.21]  Septic shock ([address] [A41.9, R65.21]. Patient sedated and intubated.  potassium phosphate IVPB  30 mmol Intravenous Once    fluconazole  400 mg Intravenous Q24H    [START ON 10/12/2020] pantoprazole  40 mg Intravenous Daily    sodium chloride  20 mL Intravenous Once    metoprolol  2.5 mg Intravenous Q6H    insulin lispro  0-6 Units Subcutaneous Q6H    sodium chloride  20 mL Intravenous Once    sodium chloride  20 mL Intravenous Once    sodium chloride flush  10 mL Intravenous BID    piperacillin-tazobactam  3.375 g Intravenous Q8H    chlorhexidine  15 mL Mouth/Throat BID    metroNIDAZOLE  500 mg Intravenous Q8H     fentanNYL, 25 mcg, Q4H PRN  artificial tears, , PRN         Objective:          PHYSICAL EXAM:    Vitals:  BP (!) 115/58   Pulse 107   Temp 98.6 °F (37 °C) (Axillary)   Resp 16   Ht 5' 6\" (1.676 m)   Wt 237 lb 14 oz (107.9 kg)   SpO2 96%   BMI 38.39 kg/m²     General:  Appears comfortable. Pt sedated and intubated  HEENT:  Mucous membranes moist. No erythema, rhinorrhea, or post-nasal drip noted. Neck:  No carotid bruits. Heart:  Rhythm regular at rate of 108  Lungs:  CTA. No wheeze, rales, or rhonchi  Abdomen:  Positive bowel sounds positive. Soft. Non-tender. No guarding, rebound or rigidity. Breast/Rectal/Genitourinary: not pertinent. Extremities:  positive for 1+ b/l lower extremity edema  Skin:  Warm and dry  Vascular: 2/4 Dorsalis Pedis pulses bilaterally.   Neuro:  Limited due to pt's status               Recent Labs     10/09/20  0515 10/10/20  0513 10/11/20  0600    138 139   K 3.8  3.8 4.0 5.0   * 109* 110*   CO2 21* 26 27   BUN 29* 27* 28*   CREATININE 0.9 0.7 0.7   GLUCOSE 159* 174* 146* Focal hypodensity within the left frontal lobe white matter. Correlate MRI   imaging as subacute infarct cannot be excluded. XR CHEST PORTABLE   Final Result   Supportive devices are in stable positions. Bibasilar effusions and airspace disease/atelectasis. Improved aeration of   the right base in the interval.         XR CHEST PORTABLE   Final Result   Increased right lower lobe infiltrate versus atelectasis and small pleural   effusion on the right. Unchanged left basilar airspace disease and small pleural effusion. US DUP LOWER EXTREMITIES BILATERAL VENOUS   Final Result   No evidence of DVT in either lower extremity. US DUP UPPER EXTREMITY LEFT VENOUS   Final Result   Occlusive thrombus left mid to proximal brachial vein. The findings were sent to the Radiology Results [address] at 11:31   am on 10/9/2020to be communicated to a licensed caregiver. XR CHEST PORTABLE   Final Result   ET tube advancement. Right lower lobe atelectasis. XR CHEST PORTABLE   Final Result   No interval change         RADIOLOGY REPORT   Final Result      XR CHEST PORTABLE   Final Result   1. No interval change in the small left pleural effusion and left lung base   atelectasis. 2. No change in the minimal right lung base atelectasis. XR CHEST PORTABLE   Final Result   Opacities are present in left lung base which appear to have increased in   could suggest increasing pneumonia, atelectasis, or effusion. XR CHEST ABDOMEN NG PLACEMENT   Final Result   Satisfactory position of nasogastric tube. XR ABDOMEN (KUB) (SINGLE AP VIEW)   Final Result   Transverse, descending, and sigmoid colon mural thickening may reflect edema   and/or inflammation/infection, without significant interval change. Slight prominence of gas throughout the small intestine, without gross   distension, suggestive of paralytic ileus.       Persisting left lung base opacity may be atelectasis with pleural effusion. Differential again includes infectious infiltrate         XR ABDOMEN (KUB) (SINGLE AP VIEW)   Final Result   Thickening of the transverse colon wall. Consistent with inflammation   identified on prior CT. US GALLBLADDER RUQ   Final Result   Cholelithiasis with positive sonographic Hopson's sign but no wall thickening   or pericholecystic inflammatory changes, altogether equivocal for   cholecystitis. Consider functional analysis with nuclear medicine HIDA scan. Hepatic steatosis. 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 Suspected small right pleural effusion. CT ABDOMEN PELVIS W IV CONTRAST Additional Contrast? None   Final Result   Addendum 1 of 1   ADDENDUM:   In the title of the examination, disregard the CT abdomen and pelvis. Final      CT ABDOMEN PELVIS W IV CONTRAST Additional Contrast? None   Final Result   Diffuse inflammation of the transverse colon, descending and rectosigmoid   colon with wall thickening and edema with active contrast   extravasation/bleeding. Hemorrhagic diverticulitis or colitis are   considered. There is some edema in the presacral area and tiny amount of air   collection in the rectovesical pouch. Walled-off microperforation is   considered. Distended gallbladder. Atelectasis/pleural effusion in the left lung base. RECOMMENDATIONS:   The clinical service was notified         XR CHEST PORTABLE   Final Result   Addendum 1 of 1   ADDENDUM:   In the title of the examination, disregard the CT abdomen and pelvis. Final      XR CHEST 1 VIEW   Final Result   Suspect left pleural effusion. No airspace consolidation. Dual-chamber   pacemaker in place. Follow-up PA and lateral radiographs may be helpful in   further evaluation.          XR CHEST PORTABLE    (Results Pending)       Assessment:    Active Problems:    Septic shock (HCC)    Hyponatremia Diarrhea    UTI (urinary tract infection) with pyuria    Anemia    Leukocytosis    Acidosis    Hypotension    Sick sinus syndrome [facility])    Cardiac pacemaker in situ  Resolved Problems:    * No resolved hospital problems. *      Plan:  1. Hypotension (septic shock vs hypovolemia) monitor bp  intensivist following. Pressors per intensivist.   2. sepsis(leukocytosis, RR noted to go up to 21 and 33, infection)POA supportive care and tx underlying infection  3. uti possibly due to catheter treatment started as outpt and continued here for now. ID following. 4. C. Diff colitis, fuminant/toxic megacolon  abx per ID   Surgery following. Gi following. 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 Peritonitis associated with c. Diff colitis  abx per ID and surgery following  6. UC less likely   bx reviewed and appears to be idiopathic colitis on bx. D/w gi. Pseudomembranous colitis likely and recommended surgical consult with notation of microperforation. Gi following peripherally  7. Hyponatremia improved monitor  8. mary renal following  Fluids per renal. improving  9.  leukocytosis possibly multifactorial monitor closely  10. Atelectasis contributing to leukocytosis  monitor  11. Anemia likely with acute blood loss component monitor and transfuse prn  12. Acidosis/lactic acidosis monitor  13. Hypoalbuminemia edema likely due to third spacing  14. Pleural effusion monitor low albumin may also be contributing  15. Atrial fib monitor and tx. Cardiology on consult  16. htn stop lisinopril  Monitor bp  17. LUE dvt  Monitor pt closely  Hematology on consult  Argatroban per intensivist/hematology   18. Hypokalemia monitor and replace prn  19. Hypophosphatemia monitor and replace prn  20.  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

## 2020-10-11 NOTE — PROGRESS NOTES
[facility]    Name: Jocelin Allan    Age: 82 y.o. Date of Admission: 10/4/2020  4:26 PM    Date of Service: 10/11/2020    Reason for Consultation: re atrial fibrillation    Referring Physician: Dr WHITTEN [facility]  Primary Care Physician: Rosie Perdomo MD    History of Present Illness: The patient is a 82y.o. year old female with a history of atrial fibrillation on sotalol for rhythm control and anticoagulated with warfarin. In September admitted to [place] with rectal bleeding and INR of 9 so warfarin was been on hold for a month. She also had some UTIs treated with antibiotics. On 10/4 admitted to ICU here from [facility] for diarrhea, hyponatremia, and hypotension. Hydrated, sotalol held and when bp improved transferred to [facility]. Was complaining of abdominal pain , developed toxic megacolon and taken to OR yesterday for colectomy and then to ICU. Developed her atrial fibrillation once again. Now intubated, on multiple pressors. History of AF suppressed with sotalol . remote h/o pericardial effusion 7/2016, last echo 8/2016 EF 60% LAE, 1-2+AI,MR, 1+ TR, RVSP 40-45mmHg, no pericardial effusion. 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 10/10/2020 interim history  Back to the OR earlier this morning for second look exploratory laparotomy with small bowel resection/ileostomy creation. Left arm DVT. Converted to sinus rhythm spontaneously yesterday evening. Telemetry reviewed: Remains in sinus rhythm  Potassium 4 creatinine 1.1  Chest x-ray showing atelectasis. Ultrasound of arm: Left proximal brachial vein thrombus      10/11/2020 interim history  Remains off pressors and in sinus rhythm, however not moving to command and is scheduled for head CT. Now on argatroban for left arm DVT in setting of thrombocytopenia.     Telemetry: Sinus rhythm/sinus tachycardia without atrial fibrillation over the past 36 hours      Review of Systems:   Cannot be obtained as on vent/fentanyl    Allergies: Allergies   Allergen Reactions    Codeine        Home Medications:  Prior to Admission medications    Medication Sig Start Date End Date Taking?  Authorizing Provider   acetaminophen (TYLENOL) 325 MG tablet Take 650 mg by mouth every 6 hours as needed for Pain   Yes Historical Provider, MD   amitriptyline (ELAVIL) 10 MG tablet Take 10 mg by mouth nightly   Yes Historical Provider, MD   dicyclomine (BENTYL) 10 MG capsule Take 10 mg by mouth every 6 hours as needed   Yes Historical Provider, MD   calcium carbonate 600 MG TABS tablet Take 1 tablet by mouth daily   Yes Historical Provider, MD   ferrous sulfate (IRON 325) 325 (65 Fe) MG tablet Take 325 mg by mouth daily (with breakfast)   Yes Historical Provider, MD   lisinopril (PRINIVIL;ZESTRIL) 40 MG tablet Take 40 mg by mouth daily   Yes Historical Provider, MD   melatonin 3 MG TABS tablet Take 6 mg by mouth daily   Yes Historical Provider, MD   tamsulosin (FLOMAX) 0.4 MG capsule Take 0.4 mg by mouth nightly    Historical Provider, MD   loperamide (IMODIUM) 2 MG capsule Take 2 mg by mouth every 12 hours as needed for Diarrhea    Historical Provider, MD   mesalamine (PENTASA) 500 MG extended release capsule Take 500 mg by mouth 4 times daily    Historical Provider, MD   cefTRIAXone (ROCEPHIN) 1 g injection Infuse 1 g intravenously every 24 hours    Historical Provider, MD   sotalol (BETAPACE) 120 MG tablet Take 120 mg by mouth 2 times daily    Historical Provider, MD   ondansetron (ZOFRAN) 4 MG tablet Take 4 mg by mouth every 6 hours as needed for Nausea or Vomiting    Historical Provider, MD       Current Medications:  Current Facility-Administered Medications   Medication Dose Route Frequency Provider Last Rate Last Dose    PN-Adult  3-in-1 Central Line (Standard)   Intravenous Continuous TPN Quita Parada MD        potassium phosphate 30 mmol in dextrose 5 % 250 mL IVPB  30 mmol Intravenous Once Casandra Jean MD        fluconazole (DIFLUCAN) in 0.9 % sodium chloride IVPB 400 mg  400 mg Intravenous Q24H Margie Jaffe MD        argatroban infusion 50mg in 0.9% sodium chloride 50 mL (premix)  2 mcg/kg/min Intravenous Continuous Casandra Jean MD       [address] [place] [address] [START ON 10/12/2020] pantoprazole (PROTONIX) injection 40 mg  40 mg Intravenous Daily Casandra Jean MD        0.9 % sodium chloride bolus  20 mL Intravenous Once [place] DO Mary Ann        metoprolol (LOPRESSOR) injection 2.5 mg  2.5 mg Intravenous Q6H Marymis Hernandez MD   2.5 mg at 10/11/20 0016    fentaNYL (SUBLIMAZE) injection 25 mcg  25 mcg Intravenous Q4H PRN Casandra Jean MD   25 mcg at 10/11/20 5490    insulin lispro (HUMALOG) injection vial 0-6 Units  0-6 Units Subcutaneous Q6H Donalda Curling, DO   1 Units at 10/11/20 0630    PN-Adult  3 IN 1 Central Line (Standard)   Intravenous Continuous TPN Cherry Wiggins MD 75 mL/hr at 10/11/20 0000      0.9 % sodium chloride bolus  20 mL Intravenous Once Cathleen Rush MD        0.9 % sodium chloride bolus  20 mL Intravenous Once Anthony Ruelas MD        sodium chloride flush 0.9 % injection 10 mL  10 mL Intravenous BID Bernadine Fonseca MD   10 mL at 10/11/20 0804    vasopressin 20 Units in dextrose 5 % 100 mL infusion  0.02 Units/min Intravenous Continuous Casandra Jean MD 6 mL/hr at 10/09/20 1450 0.02 Units/min at 10/09/20 1450    piperacillin-tazobactam (ZOSYN) 3.375 g in dextrose 5 % 50 mL IVPB extended infusion (mini-bag)  3.375 g Intravenous Q8H Margie Jaffe MD   Stopped at 10/11/20 0730    0.9 % sodium chloride infusion   Intravenous Q8H Margie Jaffe MD 12.5 mL/hr at 10/11/20 0730      norepinephrine (LEVOPHED) 16 mg in dextrose 5 % 250 mL infusion  2 mcg/min Intravenous Continuous Anthony Ruelas MD   Stopped at 10/09/20 1226    lubrifresh P.M. (artificial tears) ophthalmic ointment   Both Eyes PRN Delano Larios MD  chlorhexidine (PERIDEX) 0.12 % solution 15 mL  15 mL Mouth/Throat BID Mercedes Morin MD   15 mL at 10/11/20 0804    metronidazole (FLAGYL) 500 mg in NaCl 100 mL IVPB premix  500 mg Intravenous Q8H Mercedes Morin MD   Stopped at 10/11/20 0435      PN-Adult  3 IN 1 Central Line (Standard)      argatroban infusion      PN-Adult  3 IN 1 Central Line (Standard) 75 mL/hr at 10/11/20 0000    vasopressin (Septic Shock) infusion 0.02 Units/min (10/09/20 1450)    sodium chloride 12.5 mL/hr at 10/11/20 0730    norepinephrine Stopped (10/09/20 1226)       Physical Exam:  /60   Pulse 104   Temp 98.4 °F (36.9 °C) (Axillary)   Resp 17   Ht 5' 6\" (1.676 m)   Wt 237 lb 14 oz (107.9 kg)   SpO2 97%   BMI 38.39 kg/m²   Weight change: 5 lb 15.2 oz (2.7 kg)  Wt Readings from Last 3 Encounters:   10/11/20 237 lb 14 oz (107.9 kg)     General: Appears comfortable on current ventilator settings/sedation  HEENT: Normocephalic and atraumatic, pupils equal and round  Neck: No JVD, no carotid bruits,   Cardiac: Regular rate and rhythm. Normal S1 and physiologically split S2, no S3, no S4. Apical impulse is nondisplaced. No murmurs, no pericardial rubs, no clicks. Carotid upstrokes brisk. Respiratory: Clear anteriorly no wheezes, no rales, no rhonchi. On vent  Abdomen: s/p colectomy  Extremities: No edema, no cyanosis, no clubbing. Distal pulses intact  Skin: Intact, warm and dry, no rashes, no breakdown  Musculoskeletal: normal tone and strength in the upper and lower extremities bilaterally  Neuro:not responsive on propofol at present    Intake/Output:    Intake/Output Summary (Last 24 hours) at 10/11/2020 1102  Last data filed at 10/11/2020 0700  Gross per 24 hour   Intake 4454 ml   Output 2315 ml   Net 2139 ml     No intake/output data recorded.     Laboratory Tests:  Last 3 CBC:  Recent Labs     10/10/20  0513 10/10/20  1900 10/11/20  0600   WBC 29.9* 21.2* 23.6*   RBC 2.60* 2.58* 2.65*   HGB 7.7* 7.6* 7.8*   HCT 24.0* 23.9* 24.6*   MCV 92.3 92.6 92.8   MCH 29.6 29.5 29.4   MCHC 32.1 31.8* 31.7*   RDW 19.4* 19.6* 19.9*   PLT 45* 41* 44*   MPV 9.7 10.2 10.9       Last 3 CMP:    Recent Labs     10/09/20  0515 10/10/20  0513 10/11/20  0600    138 139   K 3.8  3.8 4.0 5.0   * 109* 110*   CO2 21* 26 27   BUN 29* 27* 28*   CREATININE 0.9 0.7 0.7   GLUCOSE 159* 174* 146*   CALCIUM 6.4* 7.0* 7.4*   PROT 3.3* 4.2* 4.1*   LABALBU 1.7* 2.3* 2.0*   BILITOT 1.7* 1.6* 1.1   ALKPHOS 116* 175* 224*   AST 26 27 43*   ALT 11 13 16       Last 3 Mag/Phos:  Recent Labs     10/09/20  0515 10/10/20  0513 10/11/20  0600   MG 1.7 2.0 2.0   PHOS 1.7* 1.3* 1.3*       Last 3 CK, CKMB, Troponin  No results for input(s): CKTOTAL, CKMB, TROPONINI in the last 72 hours. Last 3 Pro-BNP:  No results for input(s): PROBNP in the last 72 hours. No results found for: PROBNP    Last 3 Glucose:     Recent Labs     10/09/20  0515 10/10/20  0513 10/11/20  0600   GLUCOSE 159* 174* 146*       Last 3 Coags:  Recent Labs     10/09/20  1310 10/10/20  0513 10/11/20  0600   PROTIME 18.4* 13.7* 12.5*   INR 1.6 1.2 1.1     Lab Results   Component Value Date    PROTIME 12.5 10/11/2020    INR 1.1 10/11/2020       Last 3 Lipid Panel:  Recent Labs     10/10/20  0513   TRIG 123     No results found for: LDLCALC  No results found for: HDL  Lab Results   Component Value Date    TRIG 123 10/10/2020       ABGs:  Recent Labs     10/10/20  0527 10/11/20  0558   PH 7.308* 7.411   PO2 95.7  --    PCO2 47.8*  --    HCO3 23.4  --    BE -2.9 1.6*   O2SAT 96.8 99.3*       Lactic Acid:  Recent Labs     10/10/20  1900   LACTA 1.7         Radiology:  RAD Results:  XR CHEST PORTABLE   Final Result   Supportive devices are in stable positions. Bibasilar effusions and airspace disease/atelectasis.   Improved aeration of   the right base in the interval.         XR CHEST PORTABLE   Final Result   Increased right lower lobe infiltrate versus atelectasis and small pleural effusion on the right. Unchanged left basilar airspace disease and small pleural effusion. US DUP LOWER EXTREMITIES BILATERAL VENOUS   Final Result   No evidence of DVT in either lower extremity. US DUP UPPER EXTREMITY LEFT VENOUS   Final Result   Occlusive thrombus left mid to proximal brachial vein. The findings were sent to the Radiology Results [address] at 11:31   am on 10/9/2020to be communicated to a licensed caregiver. XR CHEST PORTABLE   Final Result   ET tube advancement. Right lower lobe atelectasis. XR CHEST PORTABLE   Final Result   No interval change         RADIOLOGY REPORT   Final Result      XR CHEST PORTABLE   Final Result   1. No interval change in the small left pleural effusion and left lung base   atelectasis. 2. No change in the minimal right lung base atelectasis. XR CHEST PORTABLE   Final Result   Opacities are present in left lung base which appear to have increased in   could suggest increasing pneumonia, atelectasis, or effusion. XR CHEST ABDOMEN NG PLACEMENT   Final Result   Satisfactory position of nasogastric tube. XR ABDOMEN (KUB) (SINGLE AP VIEW)   Final Result   Transverse, descending, and sigmoid colon mural thickening may reflect edema   and/or inflammation/infection, without significant interval change. Slight prominence of gas throughout the small intestine, without gross   distension, suggestive of paralytic ileus. Persisting left lung base opacity may be atelectasis with pleural effusion. Differential again includes infectious infiltrate         XR ABDOMEN (KUB) (SINGLE AP VIEW)   Final Result   Thickening of the transverse colon wall. Consistent with inflammation   identified on prior CT.          US GALLBLADDER RUQ   Final Result   Cholelithiasis with positive sonographic Hopson's sign but no wall thickening   or pericholecystic inflammatory changes, altogether equivocal for Left arm DVT. Argatroban in view of thrombocytopenia    7..        Septic shock resolved and off pressors.     8.          Head CT pending        Polo Lugo MD, [address] [place] [address] at [facility]    Electronically signed by Polo Lugo MD on 10/11/2020 at 11:02 AM

## 2020-10-12 ENCOUNTER — APPOINTMENT (OUTPATIENT)
Dept: GENERAL RADIOLOGY | Age: 85
DRG: 853 | End: 2020-10-12
Payer: MEDICARE

## 2020-10-12 LAB
AADO2: 102.9 MMHG
ALBUMIN SERPL-MCNC: 1.8 G/DL (ref 3.5–5.2)
ALP BLD-CCNC: 280 U/L (ref 35–104)
ALT SERPL-CCNC: 17 U/L (ref 0–32)
ANION GAP SERPL CALCULATED.3IONS-SCNC: 3 MMOL/L (ref 7–16)
ANISOCYTOSIS: ABNORMAL
APTT: 106.7 SEC (ref 24.5–35.1)
APTT: 79.5 SEC (ref 24.5–35.1)
APTT: 88.1 SEC (ref 24.5–35.1)
AST SERPL-CCNC: 49 U/L (ref 0–31)
B.E.: 1.3 MMOL/L (ref -3–3)
BASOPHILS ABSOLUTE: 0 E9/L (ref 0–0.2)
BASOPHILS RELATIVE PERCENT: 0 % (ref 0–2)
BILIRUB SERPL-MCNC: 1 MG/DL (ref 0–1.2)
BLOOD BANK DISPENSE STATUS: NORMAL
BLOOD BANK PRODUCT CODE: NORMAL
BODY FLUID CULTURE, STERILE: ABNORMAL
BPU ID: NORMAL
BUN BLDV-MCNC: 29 MG/DL (ref 8–23)
CALCIUM IONIZED: 1.17 MMOL/L (ref 1.15–1.33)
CALCIUM SERPL-MCNC: 7.5 MG/DL (ref 8.6–10.2)
CHLORIDE BLD-SCNC: 113 MMOL/L (ref 98–107)
CO2: 28 MMOL/L (ref 22–29)
COHB: 0.5 % (ref 0–1.5)
CREAT SERPL-MCNC: 0.5 MG/DL (ref 0.5–1)
CRITICAL: ABNORMAL
DATE ANALYZED: ABNORMAL
DATE OF COLLECTION: ABNORMAL
DESCRIPTION BLOOD BANK: NORMAL
EOSINOPHILS ABSOLUTE: 0 E9/L (ref 0.05–0.5)
EOSINOPHILS RELATIVE PERCENT: 0 % (ref 0–6)
FIO2: 40 %
GFR AFRICAN AMERICAN: >60
GFR NON-AFRICAN AMERICAN: >60 ML/MIN/1.73
GLUCOSE BLD-MCNC: 131 MG/DL (ref 74–99)
GRAM STAIN RESULT: ABNORMAL
HCO3: 25.5 MMOL/L (ref 22–26)
HCT VFR BLD CALC: 24 % (ref 34–48)
HEMOGLOBIN: 7.6 G/DL (ref 11.5–15.5)
HHB: 1.4 % (ref 0–5)
INR BLD: 4
LAB: ABNORMAL
LYMPHOCYTES ABSOLUTE: 1.42 E9/L (ref 1.5–4)
LYMPHOCYTES RELATIVE PERCENT: 8 % (ref 20–42)
Lab: ABNORMAL
MAGNESIUM: 2.1 MG/DL (ref 1.6–2.6)
MCH RBC QN AUTO: 29.7 PG (ref 26–35)
MCHC RBC AUTO-ENTMCNC: 31.7 % (ref 32–34.5)
MCV RBC AUTO: 93.8 FL (ref 80–99.9)
METAMYELOCYTES RELATIVE PERCENT: 1 % (ref 0–1)
METER GLUCOSE: 132 MG/DL (ref 74–99)
METER GLUCOSE: 132 MG/DL (ref 74–99)
METER GLUCOSE: 133 MG/DL (ref 74–99)
METER GLUCOSE: 133 MG/DL (ref 74–99)
METER GLUCOSE: 145 MG/DL (ref 74–99)
METER GLUCOSE: 228 MG/DL (ref 74–99)
METHB: 0.2 % (ref 0–1.5)
MODE: AC
MONOCYTES ABSOLUTE: 0.53 E9/L (ref 0.1–0.95)
MONOCYTES RELATIVE PERCENT: 3 % (ref 2–12)
NEUTROPHILS ABSOLUTE: 15.75 E9/L (ref 1.8–7.3)
NEUTROPHILS RELATIVE PERCENT: 88 % (ref 43–80)
NUCLEATED RED BLOOD CELLS: 1 /100 WBC
O2 CONTENT: 12.4 ML/DL
O2 SATURATION: 98.6 % (ref 92–98.5)
O2HB: 97.9 % (ref 94–97)
OPERATOR ID: ABNORMAL
ORGANISM: ABNORMAL
PATIENT TEMP: 37 C
PCO2: 38.8 MMHG (ref 35–45)
PDW BLD-RTO: 19.9 FL (ref 11.5–15)
PEEP/CPAP: 5 CMH2O
PFO2: 3.19 MMHG/%
PH BLOOD GAS: 7.44 (ref 7.35–7.45)
PHOSPHORUS: 1.5 MG/DL (ref 2.5–4.5)
PLATELET # BLD: 29 E9/L (ref 130–450)
PLATELET CONFIRMATION: NORMAL
PMV BLD AUTO: 10.9 FL (ref 7–12)
PO2: 127.7 MMHG (ref 75–100)
POTASSIUM SERPL-SCNC: 5.3 MMOL/L (ref 3.5–5)
POTASSIUM SERPL-SCNC: 5.7 MMOL/L (ref 3.5–5)
PREALBUMIN: 9 MG/DL (ref 20–40)
PROTHROMBIN TIME: 49.3 SEC (ref 9.3–12.4)
RBC # BLD: 2.56 E12/L (ref 3.5–5.5)
RI(T): 81 %
RR MECHANICAL: 16 B/MIN
SODIUM BLD-SCNC: 144 MMOL/L (ref 132–146)
SOURCE, BLOOD GAS: ABNORMAL
THB: 8.8 G/DL (ref 11.5–16.5)
TIME ANALYZED: 612
TOTAL PROTEIN: 4.2 G/DL (ref 6.4–8.3)
TOXIC GRANULATION: ABNORMAL
VT MECHANICAL: 400 ML
WBC # BLD: 17.7 E9/L (ref 4.5–11.5)

## 2020-10-12 PROCEDURE — 36556 INSERT NON-TUNNEL CV CATH: CPT

## 2020-10-12 PROCEDURE — 36600 WITHDRAWAL OF ARTERIAL BLOOD: CPT

## 2020-10-12 PROCEDURE — P9047 ALBUMIN (HUMAN), 25%, 50ML: HCPCS | Performed by: INTERNAL MEDICINE

## 2020-10-12 PROCEDURE — 71045 X-RAY EXAM CHEST 1 VIEW: CPT

## 2020-10-12 PROCEDURE — 6360000002 HC RX W HCPCS: Performed by: INTERNAL MEDICINE

## 2020-10-12 PROCEDURE — 82962 GLUCOSE BLOOD TEST: CPT

## 2020-10-12 PROCEDURE — 36592 COLLECT BLOOD FROM PICC: CPT

## 2020-10-12 PROCEDURE — 94003 VENT MGMT INPAT SUBQ DAY: CPT

## 2020-10-12 PROCEDURE — 87070 CULTURE OTHR SPECIMN AEROBIC: CPT

## 2020-10-12 PROCEDURE — 2500000003 HC RX 250 WO HCPCS: Performed by: STUDENT IN AN ORGANIZED HEALTH CARE EDUCATION/TRAINING PROGRAM

## 2020-10-12 PROCEDURE — 84134 ASSAY OF PREALBUMIN: CPT

## 2020-10-12 PROCEDURE — 6370000000 HC RX 637 (ALT 250 FOR IP): Performed by: STUDENT IN AN ORGANIZED HEALTH CARE EDUCATION/TRAINING PROGRAM

## 2020-10-12 PROCEDURE — 82330 ASSAY OF CALCIUM: CPT

## 2020-10-12 PROCEDURE — 2500000003 HC RX 250 WO HCPCS: Performed by: INTERNAL MEDICINE

## 2020-10-12 PROCEDURE — 36415 COLL VENOUS BLD VENIPUNCTURE: CPT

## 2020-10-12 PROCEDURE — 2580000003 HC RX 258: Performed by: FAMILY MEDICINE

## 2020-10-12 PROCEDURE — 85610 PROTHROMBIN TIME: CPT

## 2020-10-12 PROCEDURE — 2000000000 HC ICU R&B

## 2020-10-12 PROCEDURE — 02HV33Z INSERTION OF INFUSION DEVICE INTO SUPERIOR VENA CAVA, PERCUTANEOUS APPROACH: ICD-10-PCS | Performed by: EMERGENCY MEDICINE

## 2020-10-12 PROCEDURE — 83735 ASSAY OF MAGNESIUM: CPT

## 2020-10-12 PROCEDURE — 36430 TRANSFUSION BLD/BLD COMPNT: CPT

## 2020-10-12 PROCEDURE — 85730 THROMBOPLASTIN TIME PARTIAL: CPT

## 2020-10-12 PROCEDURE — 2580000003 HC RX 258: Performed by: INTERNAL MEDICINE

## 2020-10-12 PROCEDURE — 80053 COMPREHEN METABOLIC PANEL: CPT

## 2020-10-12 PROCEDURE — 84100 ASSAY OF PHOSPHORUS: CPT

## 2020-10-12 PROCEDURE — C1751 CATH, INF, PER/CENT/MIDLINE: HCPCS

## 2020-10-12 PROCEDURE — 6360000002 HC RX W HCPCS: Performed by: SPECIALIST

## 2020-10-12 PROCEDURE — 6370000000 HC RX 637 (ALT 250 FOR IP): Performed by: FAMILY MEDICINE

## 2020-10-12 PROCEDURE — 82805 BLOOD GASES W/O2 SATURATION: CPT

## 2020-10-12 PROCEDURE — 85025 COMPLETE CBC W/AUTO DIFF WBC: CPT

## 2020-10-12 PROCEDURE — 6360000002 HC RX W HCPCS: Performed by: FAMILY MEDICINE

## 2020-10-12 PROCEDURE — 99233 SBSQ HOSP IP/OBS HIGH 50: CPT | Performed by: INTERNAL MEDICINE

## 2020-10-12 PROCEDURE — 2500000003 HC RX 250 WO HCPCS: Performed by: FAMILY MEDICINE

## 2020-10-12 PROCEDURE — C9113 INJ PANTOPRAZOLE SODIUM, VIA: HCPCS | Performed by: INTERNAL MEDICINE

## 2020-10-12 PROCEDURE — 2580000003 HC RX 258: Performed by: SPECIALIST

## 2020-10-12 PROCEDURE — 37799 UNLISTED PX VASCULAR SURGERY: CPT

## 2020-10-12 PROCEDURE — 84132 ASSAY OF SERUM POTASSIUM: CPT

## 2020-10-12 RX ORDER — ARGATROBAN 1 MG/ML
0.25 INJECTION, SOLUTION INTRAVENOUS CONTINUOUS
Status: DISCONTINUED | OUTPATIENT
Start: 2020-10-12 | End: 2020-10-13

## 2020-10-12 RX ORDER — DEXTROSE MONOHYDRATE 25 G/50ML
25 INJECTION, SOLUTION INTRAVENOUS ONCE
Status: COMPLETED | OUTPATIENT
Start: 2020-10-12 | End: 2020-10-12

## 2020-10-12 RX ORDER — ALBUMIN (HUMAN) 12.5 G/50ML
25 SOLUTION INTRAVENOUS EVERY 8 HOURS
Status: DISCONTINUED | OUTPATIENT
Start: 2020-10-12 | End: 2020-10-14

## 2020-10-12 RX ORDER — DIGOXIN 0.25 MG/ML
250 INJECTION INTRAMUSCULAR; INTRAVENOUS ONCE
Status: COMPLETED | OUTPATIENT
Start: 2020-10-12 | End: 2020-10-12

## 2020-10-12 RX ADMIN — ALBUMIN (HUMAN) 25 G: 0.25 INJECTION, SOLUTION INTRAVENOUS at 10:59

## 2020-10-12 RX ADMIN — ARGATROBAN 0.25 MCG/KG/MIN: 50 INJECTION INTRAVENOUS at 17:49

## 2020-10-12 RX ADMIN — CHLORHEXIDINE GLUCONATE 0.12% ORAL RINSE 15 ML: 1.2 LIQUID ORAL at 21:43

## 2020-10-12 RX ADMIN — PIPERACILLIN SODIUM AND TAZOBACTAM SODIUM 3.38 G: 3; .375 INJECTION, POWDER, LYOPHILIZED, FOR SOLUTION INTRAVENOUS at 11:20

## 2020-10-12 RX ADMIN — PANTOPRAZOLE SODIUM 40 MG: 40 INJECTION, POWDER, FOR SOLUTION INTRAVENOUS at 07:57

## 2020-10-12 RX ADMIN — BUMETANIDE 0.2 MG/HR: 0.25 INJECTION, SOLUTION INTRAMUSCULAR; INTRAVENOUS at 10:57

## 2020-10-12 RX ADMIN — CALCIUM GLUCONATE 1 G: 98 INJECTION, SOLUTION INTRAVENOUS at 07:56

## 2020-10-12 RX ADMIN — Medication 10 ML: at 07:57

## 2020-10-12 RX ADMIN — METOPROLOL TARTRATE 2.5 MG: 5 INJECTION INTRAVENOUS at 14:00

## 2020-10-12 RX ADMIN — METRONIDAZOLE 500 MG: 500 INJECTION, SOLUTION INTRAVENOUS at 03:22

## 2020-10-12 RX ADMIN — DIGOXIN 250 MCG: 0.25 INJECTION INTRAMUSCULAR; INTRAVENOUS at 10:56

## 2020-10-12 RX ADMIN — ALBUMIN (HUMAN) 25 G: 0.25 INJECTION, SOLUTION INTRAVENOUS at 17:42

## 2020-10-12 RX ADMIN — FLUCONAZOLE 400 MG: 2 INJECTION, SOLUTION INTRAVENOUS at 11:20

## 2020-10-12 RX ADMIN — INSULIN HUMAN 10 UNITS: 100 INJECTION, SOLUTION PARENTERAL at 07:57

## 2020-10-12 RX ADMIN — Medication 10 ML: at 21:44

## 2020-10-12 RX ADMIN — SODIUM PHOSPHATE, MONOBASIC, MONOHYDRATE AND SODIUM PHOSPHATE, DIBASIC, ANHYDROUS 20 MMOL: 276; 142 INJECTION, SOLUTION INTRAVENOUS at 07:57

## 2020-10-12 RX ADMIN — FENTANYL CITRATE 25 MCG: 50 INJECTION, SOLUTION INTRAMUSCULAR; INTRAVENOUS at 13:17

## 2020-10-12 RX ADMIN — METOPROLOL TARTRATE 2.5 MG: 5 INJECTION INTRAVENOUS at 06:49

## 2020-10-12 RX ADMIN — INSULIN LISPRO 1 UNITS: 100 INJECTION, SOLUTION INTRAVENOUS; SUBCUTANEOUS at 00:00

## 2020-10-12 RX ADMIN — PIPERACILLIN SODIUM AND TAZOBACTAM SODIUM 3.38 G: 3; .375 INJECTION, POWDER, LYOPHILIZED, FOR SOLUTION INTRAVENOUS at 18:00

## 2020-10-12 RX ADMIN — POTASSIUM CHLORIDE: 2 INJECTION, SOLUTION, CONCENTRATE INTRAVENOUS at 17:49

## 2020-10-12 RX ADMIN — DEXTROSE MONOHYDRATE 25 G: 25 INJECTION, SOLUTION INTRAVENOUS at 07:57

## 2020-10-12 RX ADMIN — METOPROLOL TARTRATE 2.5 MG: 5 INJECTION INTRAVENOUS at 00:12

## 2020-10-12 RX ADMIN — CHLORHEXIDINE GLUCONATE 0.12% ORAL RINSE 15 ML: 1.2 LIQUID ORAL at 07:57

## 2020-10-12 RX ADMIN — PIPERACILLIN SODIUM AND TAZOBACTAM SODIUM 3.38 G: 3; .375 INJECTION, POWDER, LYOPHILIZED, FOR SOLUTION INTRAVENOUS at 03:22

## 2020-10-12 RX ADMIN — FENTANYL CITRATE 25 MCG: 50 INJECTION, SOLUTION INTRAMUSCULAR; INTRAVENOUS at 03:22

## 2020-10-12 RX ADMIN — FENTANYL CITRATE 25 MCG: 50 INJECTION, SOLUTION INTRAMUSCULAR; INTRAVENOUS at 08:31

## 2020-10-12 RX ADMIN — SODIUM CHLORIDE: 9 INJECTION, SOLUTION INTRAVENOUS at 16:20

## 2020-10-12 ASSESSMENT — PULMONARY FUNCTION TESTS
PIF_VALUE: 14
PIF_VALUE: 22
PIF_VALUE: 18
PIF_VALUE: 15
PIF_VALUE: 21
PIF_VALUE: 13
PIF_VALUE: 20
PIF_VALUE: 13
PIF_VALUE: 15
PIF_VALUE: 14
PIF_VALUE: 14
PIF_VALUE: 20
PIF_VALUE: 22
PIF_VALUE: 19
PIF_VALUE: 21
PIF_VALUE: 14
PIF_VALUE: 18
PIF_VALUE: 19
PIF_VALUE: 19
PIF_VALUE: 16
PIF_VALUE: 13
PIF_VALUE: 18
PIF_VALUE: 21

## 2020-10-12 ASSESSMENT — PAIN SCALES - GENERAL
PAINLEVEL_OUTOF10: 0
PAINLEVEL_OUTOF10: 4
PAINLEVEL_OUTOF10: 2
PAINLEVEL_OUTOF10: 6

## 2020-10-12 NOTE — PROGRESS NOTES
[facility] Hospitalist   Progress Note    Admitting Date and Time: 10/4/2020  4:26 PM  Admit Dx: Septic shock ([address] [A41.9, R65.21]  Septic shock ([address] [A41.9, R65.21]    Subjective:    Patient was admitted with Septic shock ([address] [A41.9, R65.21]  Septic shock ([address] [A41.9, R65.21]. Patient intubated.  albumin human  25 g Intravenous Q8H    fluconazole  400 mg Intravenous Q24H    pantoprazole  40 mg Intravenous Daily    metoprolol  2.5 mg Intravenous Q6H    insulin lispro  0-6 Units Subcutaneous Q6H    sodium chloride flush  10 mL Intravenous BID    piperacillin-tazobactam  3.375 g Intravenous Q8H    chlorhexidine  15 mL Mouth/Throat BID     fentanNYL, 25 mcg, Q4H PRN  artificial tears, , PRN         Objective:        PHYSICAL EXAM:    Vitals:  /61   Pulse 117   Temp 98.2 °F (36.8 °C)   Resp 14   Ht 5' 6\" (1.676 m)   Wt 238 lb 5.1 oz (108.1 kg)   SpO2 99%   BMI 38.47 kg/m²     General:  Appears comfortable. Pt intubated  HEENT:  Mucous membranes moist. No erythema, rhinorrhea, or post-nasal drip noted. Neck:  No carotid bruits. Heart:  Rhythm regular at rate of 110  Lungs:  CTA. No wheeze, rales, or rhonchi  Abdomen:  Positive bowel sounds positive. Soft. Non-tender. No guarding, rebound or rigidity. Breast/Rectal/Genitourinary: not pertinent. Extremities:  posiive for 1+ b/l lower extremity edema  Skin:  Warm and dry  Vascular: 2/4 Dorsalis Pedis pulses bilaterally.   Neuro:  Limited due to pt's status              Recent Labs     10/10/20  0513 10/11/20  0600 10/12/20  0545 10/12/20  1115    139 144  --    K 4.0 5.0 5.7* 5.3*   * 110* 113*  --    CO2 26 27 28  --    BUN 27* 28* 29*  --    CREATININE 0.7 0.7 0.5  --    GLUCOSE 174* 146* 131*  --    CALCIUM 7.0* 7.4* 7.5*  --        Recent Labs     10/10/20  1900 10/11/20  0600 10/11/20  2220 10/12/20  0545   WBC 21.2* 23.6*  --  17.7*   RBC 2.58* 2.65*  --  2.56*   HGB 7.6* 7.8* 7.4* 7.6*   HCT 23.9* 24.6* 23.3* 24.0*   MCV 92.6 92.8  --  93.8   MCH 29.5 29.4  --  29.7   MCHC 31.8* 31.7*  --  31.7*   RDW 19.6* 19.9*  --  19.9*   PLT 41* 44*  --  29*   MPV 10.2 10.9  --  10.9       CBC with Differential:    Lab Results   Component Value Date    WBC 17.7 10/12/2020    RBC 2.56 10/12/2020    HGB 7.6 10/12/2020    HCT 24.0 10/12/2020    PLT 29 10/12/2020    MCV 93.8 10/12/2020    MCH 29.7 10/12/2020    MCHC 31.7 10/12/2020    RDW 19.9 10/12/2020    NRBC 1.0 10/12/2020    METASPCT 1.0 10/12/2020    LYMPHOPCT 8.0 10/12/2020    PROMYELOPCT 1.0 10/10/2020    MONOPCT 3.0 10/12/2020    MYELOPCT 4.0 10/10/2020    BASOPCT 0.0 10/12/2020    MONOSABS 0.53 10/12/2020    LYMPHSABS 1.42 10/12/2020    EOSABS 0.00 10/12/2020    BASOSABS 0.00 10/12/2020     CMP:    Lab Results   Component Value Date     10/12/2020    K 5.3 10/12/2020    K 3.8 10/09/2020     10/12/2020    CO2 28 10/12/2020    BUN 29 10/12/2020    CREATININE 0.5 10/12/2020    GFRAA >60 10/12/2020    LABGLOM >60 10/12/2020    GLUCOSE 131 10/12/2020    PROT 4.2 10/12/2020    LABALBU 1.8 10/12/2020    CALCIUM 7.5 10/12/2020    BILITOT 1.0 10/12/2020    ALKPHOS 280 10/12/2020    AST 49 10/12/2020    ALT 17 10/12/2020     Ionized Calcium:  No results found for: IONCA  Magnesium:    Lab Results   Component Value Date    MG 2.1 10/12/2020     Phosphorus:    Lab Results   Component Value Date    PHOS 1.5 10/12/2020     PT/INR:    Lab Results   Component Value Date    PROTIME 49.3 10/12/2020    INR 4.0 10/12/2020     PTT:    Lab Results   Component Value Date    APTT 106.7 10/12/2020   [APTT}     Radiology:   XR CHEST PORTABLE   Preliminary Result   1. Left internal jugular central venous catheter tip is not well seen but   appears to terminate in the lower superior vena cava. No complication   including pneumothorax. 2. Other lines and tubes are stable. 3. Stable cardiopulmonary status. XR CHEST PORTABLE   Final Result   1.  Moderate bilateral pleural intestine, without gross   distension, suggestive of paralytic ileus. Persisting left lung base opacity may be atelectasis with pleural effusion. Differential again includes infectious infiltrate         XR ABDOMEN (KUB) (SINGLE AP VIEW)   Final Result   Thickening of the transverse colon wall. Consistent with inflammation   identified on prior CT. US GALLBLADDER RUQ   Final Result   Cholelithiasis with positive sonographic Hopson's sign but no wall thickening   or pericholecystic inflammatory changes, altogether equivocal for   cholecystitis. Consider functional analysis with nuclear medicine HIDA scan. Hepatic steatosis. 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 Suspected small right pleural effusion. CT ABDOMEN PELVIS W IV CONTRAST Additional Contrast? None   Final Result   Addendum 1 of 1   ADDENDUM:   In the title of the examination, disregard the CT abdomen and pelvis. Final      CT ABDOMEN PELVIS W IV CONTRAST Additional Contrast? None   Final Result   Diffuse inflammation of the transverse colon, descending and rectosigmoid   colon with wall thickening and edema with active contrast   extravasation/bleeding. Hemorrhagic diverticulitis or colitis are   considered. There is some edema in the presacral area and tiny amount of air   collection in the rectovesical pouch. Walled-off microperforation is   considered. Distended gallbladder. Atelectasis/pleural effusion in the left lung base. RECOMMENDATIONS:   The clinical service was notified         XR CHEST PORTABLE   Final Result   Addendum 1 of 1   ADDENDUM:   In the title of the examination, disregard the CT abdomen and pelvis. Final      XR CHEST 1 VIEW   Final Result   Suspect left pleural effusion. No airspace consolidation. Dual-chamber   pacemaker in place. Follow-up PA and lateral radiographs may be helpful in   further evaluation.          XR CHEST PORTABLE (Results Pending)       Assessment:    Active Problems:    Septic shock (HCC)    Hyponatremia    Diarrhea    UTI (urinary tract infection) with pyuria    Anemia    Leukocytosis    Acidosis    Hypotension    Sick sinus syndrome (HCC)    Cardiac pacemaker in situ    C. difficile colitis    QI (acute kidney injury) ([facility]    Thrombocytopenia (HCC)    Anemia associated with acute blood loss  Resolved Problems:    * No resolved hospital problems. *      Plan:  1. Hypotension (septic shock vs hypovolemia) monitor bp  intensivist following. Pressors per intensivist.   2. sepsis(leukocytosis, RR noted to go up to 21 and 33, infection)POA supportive care and tx underlying infection  3. uti possibly due to catheter treatment started as outpt and continued here for now.  ID following. 4. C. Diff colitis, fuminant/toxic megacolon  abx per ID   Surgery following. Gi following. 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 Peritonitis associated with c. Diff colitis  abx per ID and surgery following  6. UC less likely   bx reviewed and appears to be idiopathic colitis on bx. D/w gi. Pseudomembranous colitis likely and recommended surgical consult with notation of microperforation. Gi following peripherally  7. Hyponatremia improved monitor  8. qi renal following  Fluids per renal. improving  9.  leukocytosis possibly multifactorial monitor closely  10. Atelectasis contributing to leukocytosis  monitor  11. Anemia likely with acute blood loss component monitor and transfuse prn  12. Acidosis/lactic acidosis monitor  13. Hypoalbuminemia edema likely due to third spacing  14. Pleural effusion monitor low albumin may also be contributing  15. Atrial fib monitor and tx. Cardiology on consult  16. htn stop lisinopril  Monitor bp  17. LUE dvt  Monitor pt closely  Hematology on consult  Argatroban per intensivist/hematology   18. Hypokalemia monitor and replace prn  19.  Hypophosphatemia monitor and replace

## 2020-10-12 NOTE — PROCEDURES
Central Line Placement Procedure Note    Indication: centrally administered medications    Consent: The family members were counseled regarding the procedure, its indications, risks, potential complications and alternatives, and any questions were answered. Consent was obtained to proceed. Procedure: The patient was positioned appropriately and the skin over the left internal jugular vein was prepped with betadine and draped in a sterile fashion. Local anesthesia was obtained by infiltration using 1% Lidocaine without epinephrine. A large bore needle was used to identify the vein. A guide wire was then inserted into the vein through the needle. A triple lumen catheter was then inserted into the vessel over the guide wire using the Seldinger technique. All ports showed good, free flowing blood return and were flushed with saline solution. The catheter was then securely fastened to the skin with suture at 19 cm. Two sutures were placed into the proximal eyelets. An antibiotic disk was placed and the site was then covered with a sterile dressing. A post procedure X-ray was ordered and showed good line position. The patient tolerated the procedure well. Complications: None    Ranjana Araya DO  Resident, PGY2    I was at the bedside throughout the entire procedure .   Lilly Kelsey MD

## 2020-10-12 NOTE — CONSULTS
VAC placement. She was  transfused platelets prior to the procedure and platelet count is up to  45,000. Today, white count 23.6, hemoglobin 7.8, platelets 44, iron  level 16, TIBC 25, iron saturations 64%, ferritin 323. Coags:  Pro-time  12.5, INR 1.1, PTT of 32.6, creatinine 0.7, BUN 28, protein 4.1,  phosphorous 1.3, albumin 2.0, alkaline phosphatase 224, ALT 16, AST 43,  bilirubin 1.1. Cultures positive. She has evidence of severe C. diff  infection with colitis, peritonitis associated with the C. difficile  colitis. Peritoneal fluid positive for E. coli and Candida albicans. Urine culture had been positive for E. coli and Corynebacterium as well  as yeast, and stool positive for C. difficile. 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 She did have  ultrasound of bilateral lower extremities, which were negative. DVT was  found in the left mid to proximal brachial vein in the left upper  extremity. PAST MEDICAL HISTORY:  Significant for anemia, atrial fibrillation,  colitis, hypertension. MEDICATIONS:  As per the chart. ALLERGIES:  To CODEINE. SOCIAL HISTORY:  Comes in from an extended care facility. No smoking or  alcohol. FAMILY HISTORY:  Mother  from breast cancer and CAD. She has a  sister with breast cancer. Son with heart valve replacement. Daughter  with thyroid disease. REVIEW OF SYSTEMS:  Unable to obtain from the patient as she is  minimally responsive and intubated. No sedation. PHYSICAL EXAMINATION:  VITAL SIGNS:  Temperature 98.6, pulse 116, respirations 15, blood  pressure 115/58, 96% on 40% FiO2. HEENT: The patient's eyes are open, but no intentional eye movement. Anicteric sclerae. Oropharynx with ET tube in place. There is an NG  tube in place. LUNGS:  Decent air movement. No wheezing or rhonchi.   HEART:  S1, S2.  Regular rate and rhythm. ABDOMEN:  With right-sided ostomy, large incision with wound VAC in  place, left of midline. Two JULISSA drains in place. EXTREMITIES:  Significant anasarca, bilateral upper and lower extremity  edema, weeping, and ecchymosis of the left arm. No skin breakdown seen  on the extremities. Martin catheter in place. Some slight ecchymosis on  the fingertips and two toes on the right foot. LABORATORY DATA:  As dictated above. IMPRESSION:  An 82-year-old woman, critically ill, admitted with septic  shock, toxic megacolon from C. difficile colitis, requiring total  colectomy and abdominal wound dressing. Peritoneal fluid positive with  E. coli and Candida albicans. Urine culture positive with E. coli,  Corynebacterium, and yeast.  Stool is positive for C. difficile. She  had normal platelet count on admission, severe leukemoid reaction,  septic shock. She would have had some component of thrombocytopenia  from possibly septic shock, but continued decrease in her platelet  count, possibly secondary to Lovenox, assessing for HIT. She did have  good response to the platelets that were transfused prior to her second  surgery on 10/10/2020. Platelets are in the 40,000 range yesterday and  today. 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 Anticoagulation was okay with General Surgery and Urology. Continue with argatroban protocol and following the blood work, checking  LDH, haptoglobin and platelet factor 4 antibody as well. Continue  antibiotic coverage as per ID. Once her platelet count is above  150,000, she could then be switched over to Coumadin anticoagulation. She will continue on TPN for nutritional support. Currently,  mechanically ventilated.   She does have encephalopathy as she has no  purposeful eye contact even after sedation has been held 24 hours. We will continue to follow along with you. Thank you very much for  allowing us to participate in her care.       ROSSY PERES MD  D: 10/11/2020 16:28:02       T: 10/11/2020 21:00:29     [email or web address]  Job#: [id number]     Doc#: [id number]

## 2020-10-12 NOTE — PATIENT CARE CONFERENCE
Intensive Care Daily Quality Rounding Checklist        ICU Team Members: bedside nurse, charge nurse, rt, dr Marco Noble     ICU Day #: NUMBER: 5     Intubation Date: October 7     Ventilator Day #: NUMBER: 5     Central Line Insertion Erwin Maysus                                                    Day #: NUMBER: 8      Arterial Line Insertion Date: Idalia Peters                             [facility] #: NUMBER: 5     DVT Prophylaxis: Argatroban    GI Prophylaxis: Protonix     Martin Catheter Insertion Date: October 4                                        Day #: 8                             Continued need (if yes, reason documented and discussed with physician): yes, strict I&o        Skin Issues/ Wounds and ordered treatment discussed on rounds: y     Goals/ Plans for the Day: Monitor vitals and labs, replace as need, wean vent as sy, ffp, platelets, line change, start bumex gtt, consult neurology

## 2020-10-12 NOTE — PROGRESS NOTES
[facility] [address] [facility]    Name: Wenona Meigs    Age: 82 y.o. Date of Admission: 10/4/2020  4:26 PM    Date of Service: 10/12/2020    Reason for Consultation: re atrial fibrillation    Referring Physician: Dr Mora [facility]  Primary Care Physician: Evon Bhardwaj MD    History of Present Illness: The patient is a 82y.o. year old female with a history of atrial fibrillation on sotalol for rhythm control and anticoagulated with warfarin. In September admitted to [facility] with rectal bleeding and INR of 9 so warfarin was been on hold for a month. She also had some UTIs treated with antibiotics. On 10/4 admitted to ICU here from [facility] for diarrhea, hyponatremia, and hypotension. Hydrated, sotalol held and when bp improved transferred to tele. Was complaining of abdominal pain , developed toxic megacolon and taken to OR yesterday for colectomy and then to ICU. Developed her atrial fibrillation once again. Now intubated, on multiple pressors. History of AF suppressed with sotalol . remote h/o pericardial effusion 7/2016, last echo 8/2016 EF 60% LAE, 1-2+AI,MR, 1+ TR, RVSP 40-45mmHg, no pericardial effusion. 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 Interm:  Back to OR 10/10. Still on vent , off pressors, appears to be back in sinus rhythm/sinus tachy now. Review of Systems:   Cannot be obtained as on vent. Allergies: Allergies   Allergen Reactions    Codeine        Home Medications:  Prior to Admission medications    Medication Sig Start Date End Date Taking?  Authorizing Provider   acetaminophen (TYLENOL) 325 MG tablet Take 650 mg by mouth every 6 hours as needed for Pain   Yes Historical Provider, MD   amitriptyline (ELAVIL) 10 MG tablet Take 10 mg by mouth nightly   Yes Historical Provider, MD   dicyclomine (BENTYL) 10 MG capsule Take 10 mg by mouth every 6 hours as needed   Yes Historical Provider, MD calcium carbonate 600 MG TABS tablet Take 1 tablet by mouth daily   Yes Historical Provider, MD   ferrous sulfate (IRON 325) 325 (65 Fe) MG tablet Take 325 mg by mouth daily (with breakfast)   Yes Historical Provider, MD   lisinopril (PRINIVIL;ZESTRIL) 40 MG tablet Take 40 mg by mouth daily   Yes Historical Provider, MD   melatonin 3 MG TABS tablet Take 6 mg by mouth daily   Yes Historical Provider, MD   tamsulosin (FLOMAX) 0.4 MG capsule Take 0.4 mg by mouth nightly    Historical Provider, MD   loperamide (IMODIUM) 2 MG capsule Take 2 mg by mouth every 12 hours as needed for Diarrhea    Historical Provider, MD   mesalamine (PENTASA) 500 MG extended release capsule Take 500 mg by mouth 4 times daily    Historical Provider, MD   cefTRIAXone (ROCEPHIN) 1 g injection Infuse 1 g intravenously every 24 hours    Historical Provider, MD   sotalol (BETAPACE) 120 MG tablet Take 120 mg by mouth 2 times daily    Historical Provider, MD   ondansetron (ZOFRAN) 4 MG tablet Take 4 mg by mouth every 6 hours as needed for Nausea or Vomiting    Historical Provider, MD       Current Medications:  Current Facility-Administered Medications   Medication Dose Route Frequency Provider Last Rate Last Dose    sodium phosphate 20 mmol in dextrose 5 % 250 mL IVPB  20 mmol Intravenous Once Pasha Angel MD        insulin regular (HUMULIN R;NOVOLIN R) injection 10 Units  10 Units Subcutaneous Once Pasha Angel MD        calcium gluconate 1 g in dextrose 5 % 100 mL IVPB  1 g Intravenous Once Pasha Angel MD        dextrose 50 % IV solution  25 g Intravenous Once Pasha Angel MD        PN-Adult  3-in-1 Central Line (Standard)   Intravenous Continuous TPN David Garcia MD 75 mL/hr at 10/11/20 2000      fluconazole (DIFLUCAN) in 0.9 % sodium chloride IVPB 400 mg  400 mg Intravenous Q24H Franc Sarkar  mL/hr at 10/11/20 1155 400 mg at 10/11/20 1155    pantoprazole (PROTONIX) injection 40 mg  40 mg Intravenous Daily Lana Alston MD        argatroban infusion 50mg in 0.9% sodium chloride 50 mL (premix)  0.5 mcg/kg/min Intravenous Continuous Lana Alston MD 1.6 mL/hr at 10/12/20 0657 0.25 mcg/kg/min at 10/12/20 0657    0.9 % sodium chloride bolus  20 mL Intravenous Once Carmencita Padilla DO        metoprolol (LOPRESSOR) injection 2.5 mg  2.5 mg Intravenous Q6H Juliane Osborne MD   2.5 mg at 10/12/20 0649    fentaNYL (SUBLIMAZE) injection 25 mcg  25 mcg Intravenous Q4H PRN Lana Alston MD   25 mcg at 10/12/20 0322    insulin lispro (HUMALOG) injection vial 0-6 Units  0-6 Units Subcutaneous Q6H Mary Roque DO   1 Units at 10/12/20 0000    0.9 % sodium chloride bolus  20 mL Intravenous Once Tsering Garza MD        0.9 % sodium chloride bolus  20 mL Intravenous Once Oni Pantoja MD        sodium chloride flush 0.9 % injection 10 mL  10 mL Intravenous BID Cande Schmid MD   10 mL at 10/11/20 2111    piperacillin-tazobactam (ZOSYN) 3.375 g in dextrose 5 % 50 mL IVPB extended infusion (mini-bag)  3.375 g Intravenous Q8H Elisabeth De La Rosa MD 12.5 mL/hr at 10/12/20 0322 3.375 g at 10/12/20 0322    0.9 % sodium chloride infusion   Intravenous Q8H Elisabeth De La Rosa MD 12.5 mL/hr at 10/11/20 2325      norepinephrine (LEVOPHED) 16 mg in dextrose 5 % 250 mL infusion  2 mcg/min Intravenous Continuous Oni Pantoja MD   Stopped at 10/09/20 1226    lubrifresh P.M. (artificial tears) ophthalmic ointment   Both Eyes PRN Monica Kumar MD        chlorhexidine (PERIDEX) 0.12 % solution 15 mL  15 mL Mouth/Throat BID Monica Kumar MD   15 mL at 10/11/20 2111    metronidazole (FLAGYL) 500 mg in NaCl 100 mL IVPB premix  500 mg Intravenous Q8H Monica Kumar MD   Stopped at 10/12/20 0430      PN-Adult  3 IN 1 Central Line (Standard) 75 mL/hr at 10/11/20 2000    argatroban infusion 0.25 mcg/kg/min (10/12/20 0657)    sodium chloride 12.5 mL/hr at 10/11/20 5635    norepinephrine Stopped (10/09/20 1226)       Physical Exam:  BP (!) 107/55   Pulse 131   Temp 98 °F (36.7 °C) (Axillary)   Resp 18   Ht 5' 6\" (1.676 m)   Wt 238 lb 5.1 oz (108.1 kg)   SpO2 96%   BMI 38.47 kg/m²   Weight change: 7.1 oz (0.2 kg)  Wt Readings from Last 3 Encounters:   10/12/20 238 lb 5.1 oz (108.1 kg)     General: elderly woman laying in bed intubated  HEENT: Normocephalic and atraumatic, extraocular movements intact, pupils equal and round, moist mucus membranes, sclera anicteric  Neck: No JVD, no carotid bruits, no thyromegaly, no adenopathy  Cardiac: tachy regular , normal S1 and physiologically split S2, no S3, no S4. Apical impulse is nondisplaced. No murmurs, no pericardial rubs, no clicks. Carotid upstrokes brisk. Respiratory: Clear anteriorly no wheezes, no rales, no rhonchi. On vent  Abdomen: s/p colectomy  Extremities: No edema, no cyanosis, no clubbing. Distal pulses intact  Skin: Intact, warm and dry, no rashes, no breakdown  Musculoskeletal: normal tone and strength in the upper and lower extremities bilaterally  Neuro:not responsive on propofol at present    Intake/Output:    Intake/Output Summary (Last 24 hours) at 10/12/2020 0720  Last data filed at 10/12/2020 0600  Gross per 24 hour   Intake 3177 ml   Output 2310 ml   Net 867 ml     No intake/output data recorded.     Laboratory Tests:  Last 3 CBC:  Recent Labs     10/10/20  1900 10/11/20  0600 10/11/20  2220 10/12/20  0545   WBC 21.2* 23.6*  --  17.7*   RBC 2.58* 2.65*  --  2.56*   HGB 7.6* 7.8* 7.4* 7.6*   HCT 23.9* 24.6* 23.3* 24.0*   MCV 92.6 92.8  --  93.8   MCH 29.5 29.4  --  29.7   MCHC 31.8* 31.7*  --  31.7*   RDW 19.6* 19.9*  --  19.9*   PLT 41* 44*  --  29*   MPV 10.2 10.9  --  10.9       Last 3 CMP:    Recent Labs     10/10/20  0513 10/11/20  0600 10/12/20  0545    139 144   K 4.0 5.0 5.7*   * 110* 113*   CO2 26 27 28   BUN 27* 28* 29*   CREATININE 0.7 0.7 0.5   GLUCOSE 174* 146* 131*   CALCIUM 7.0* 7.4* 7.5*   PROT 4.2* 4.1* 4.2* LABALBU 2.3* 2.0* 1.8*   BILITOT 1.6* 1.1 1.0   ALKPHOS 175* 224* 280*   AST 27 43* 49*   ALT 13 16 17       Last 3 Mag/Phos:  Recent Labs     10/10/20  0513 10/11/20  0600 10/12/20  0545   MG 2.0 2.0 2.1   PHOS 1.3* 1.3* 1.5*       Last 3 CK, CKMB, Troponin  No results for input(s): CKTOTAL, CKMB, TROPONINI in the last 72 hours. Last 3 Pro-BNP:  No results for input(s): PROBNP in the last 72 hours. No results found for: PROBNP    Last 3 Glucose:     Recent Labs     10/10/20  0513 10/11/20  0600 10/12/20  0545   GLUCOSE 174* 146* 131*       Last 3 Coags:  Recent Labs     10/10/20  0513 10/11/20  0600 10/12/20  0545   PROTIME 13.7* 12.5* 49.3*   INR 1.2 1.1 4.0     Lab Results   Component Value Date    PROTIME 49.3 10/12/2020    INR 4.0 10/12/2020       Last 3 Lipid Panel:  Recent Labs     10/10/20  0513   TRIG 123     No results found for: LDLCALC  No results found for: HDL  Lab Results   Component Value Date    TRIG 123 10/10/2020     No components found for: CHLPL    TSH:  No results for input(s): TSH in the last 72 hours. No results found for: TSH    ABGs:  Recent Labs     10/12/20  0612   PH 7.436   PO2 127.7*   PCO2 38.8   HCO3 25.5   BE 1.3   O2SAT 98.6*       Lactic Acid:  Recent Labs     10/10/20  1900   LACTA 1.7         Radiology:  RAD Results:  CT HEAD WO CONTRAST   Preliminary Result   Focal hypodensity within the left frontal lobe white matter. Correlate MRI   imaging as subacute infarct cannot be excluded. XR CHEST PORTABLE   Final Result   Supportive devices are in stable positions. Bibasilar effusions and airspace disease/atelectasis. Improved aeration of   the right base in the interval.         XR CHEST PORTABLE   Final Result   Increased right lower lobe infiltrate versus atelectasis and small pleural   effusion on the right. Unchanged left basilar airspace disease and small pleural effusion.          US DUP LOWER EXTREMITIES BILATERAL VENOUS   Final Result   No evidence Suspected small right pleural effusion. CT ABDOMEN PELVIS W IV CONTRAST Additional Contrast? None   Final Result   Addendum 1 of 1   ADDENDUM:   In the title of the examination, disregard the CT abdomen and pelvis. Final      CT ABDOMEN PELVIS W IV CONTRAST Additional Contrast? None   Final Result   Diffuse inflammation of the transverse colon, descending and rectosigmoid   colon with wall thickening and edema with active contrast   extravasation/bleeding. Hemorrhagic diverticulitis or colitis are   considered. There is some edema in the presacral area and tiny amount of air   collection in the rectovesical pouch. Walled-off microperforation is   considered. Distended gallbladder. Atelectasis/pleural effusion in the left lung base. RECOMMENDATIONS:   The clinical service was notified         XR CHEST PORTABLE   Final Result   Addendum 1 of 1   ADDENDUM:   In the title of the examination, disregard the CT abdomen and pelvis. Final      XR CHEST 1 VIEW   Final Result   Suspect left pleural effusion. No airspace consolidation. Dual-chamber   pacemaker in place. Follow-up PA and lateral radiographs may be helpful in   further evaluation. XR CHEST PORTABLE    (Results Pending)   XR CHEST PORTABLE    (Results Pending)         EKG and Telemetry:  12-lead EKG personally reviewed and shows NSR, possible old inferior MI pattern, QT prolongation    Telemetry personally reviewed and shows atrial fibrillation rate 120-130's        ASSESSMENT / PLAN:    1. Atrial fibrillation after coming off sotalol and significant stressors. Presently back in sinus rhythm. Off anticoagulation for the past month.  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 Still on vent, off pressors- prognosis grim  4 Thrombocytopenia 29  and  Anemia Hg 7.6-  5 ID-C. diff, toxic megacolon on flagyl-   6 Resp on vent support  7 DVT left arm- on argatroban due to thrombocytopenia          Thank you for consultation.     Haim Altman MD, [facility]  The [address] at [facility]    Electronically signed by Haim Altman MD on 10/12/2020 at 7:20 AM

## 2020-10-12 NOTE — PROGRESS NOTES
SURGERY  DAILY PROGRESS NOTE  10/12/2020        Chief Complaint   Patient presents with    Hypotension    Diarrhea       Subjective:  Vent settings minimal  Off pressors  Drains ss  Sweat in ileostomy bag  Urinating   Blinking - not following commands        Objective:  /63   Pulse 130   Temp 98 °F (36.7 °C) (Axillary)   Resp 21   Ht 5' 6\" (1.676 m)   Wt 238 lb 5.1 oz (108.1 kg)   SpO2 99%   BMI 38.47 kg/m²     GENERAL: Intubated sedated  LUNGS:  No increased work of breathing on ventilator  CARDIOVASCULAR:  Afib RVR, -130s. ABDOMEN:  Soft, incisional wound vac,       Assessment/Plan:  82 y.o. female with septic shock nowand c. Diff colitis s/p total colectomy, left in discontinuity with abthera placement 10/7, s/p reexploration and loop ileostomy creation with abdominal wall closure 10/10     - Abx per ID  - TPN  - monitor for ileostomy output  - argatroban    - Appreciated critical care  - Appreciate consultant recommendations    Electronically signed by Ronda Ram MD on 10/12/2020 at 5:14 AM       Attending Physician Statement:    Chief Complaint:   Chief Complaint   Patient presents with    Hypotension    Diarrhea       I have examined the patient and performed the key aspects of physical exam, reviewed the record (including all pertinent and new radiology images and laboratory findings), and discussed the case with the surgical team.  I agree with the assessment and plan with the following additions, corrections, and changes. 14pt review of symptoms completed and negative except as mentioned. Not much in way of neuro progress. Cont TPN, await bowel function. Cont abx. Alis Burdick MD  10/12/20  6:30 PM    NOTE: This report, in part or full, may have been transcribed using voice recognition software. Every effort was made to ensure accuracy; however, inadvertent computerized transcription errors may be present.  Please excuse any transcriptional grammatical or spelling errors that may have escaped my editorial review.

## 2020-10-12 NOTE — PROGRESS NOTES
New ileostomy, Wound VAC  Intubated, daughter present  Stoma red, left side mauve. Buttocks, coccyx area remain the same per nurse  Plan for ostomy and VAC change tomorrow  Gaetano Womack.  Rhonda Braxton, CNS, Wound Care

## 2020-10-12 NOTE — PROGRESS NOTES
Subjective:    Patient is intubated. Daughter Phil Urias at bedside. She is not on any sedation but can get PRN fentanyl. Per nurse, patient does withdrawal to pain but does not follow commands or perform purposeful movements. Daughter expressed concerns about overall prognosis. She states she does not want to proceed any further with treatment if prognosis is poor.  is aware and will be speaking with family this morning. No active bleeding noted. Objective:    BP (!) 107/55   Pulse 111   Temp 98 °F (36.7 °C) (Axillary)   Resp 16   Ht 5' 6\" (1.676 m)   Wt 238 lb 5.1 oz (108.1 kg)   SpO2 96%   BMI 38.47 kg/m²     General: NAD  HEENT: No thrush or mucositis, EOMI, PERRLA, scant blood in back of throat, ETT in place  Heart:  RRR, no murmurs, gallops, or rubs.   Lungs:  diminished , no wheeze, rales or rhonchi  Abd: bowel sounds present, nontender, nondistended, no masses  Extrem:  BLE +3 edema, +2 BUE, no clubbing, cyanosis,  Lymphatics: No palpable adenopathy in cervical and supraclavicular regions  Skin: Intact, no petechia or purpura    CBC with Differential:    Lab Results   Component Value Date    WBC 17.7 10/12/2020    RBC 2.56 10/12/2020    HGB 7.6 10/12/2020    HCT 24.0 10/12/2020    PLT 29 10/12/2020    MCV 93.8 10/12/2020    MCH 29.7 10/12/2020    MCHC 31.7 10/12/2020    RDW 19.9 10/12/2020    NRBC 1.7 10/11/2020    METASPCT 2.0 10/10/2020    LYMPHOPCT 3.4 10/11/2020    PROMYELOPCT 1.0 10/10/2020    MONOPCT 2.6 10/11/2020    MYELOPCT 4.0 10/10/2020    BASOPCT 0.0 10/11/2020    MONOSABS 0.71 10/11/2020    LYMPHSABS 0.71 10/11/2020    EOSABS 0.00 10/11/2020    BASOSABS 0.00 10/11/2020     CMP:    Lab Results   Component Value Date     10/12/2020    K 5.7 10/12/2020    K 3.8 10/09/2020     10/12/2020    CO2 28 10/12/2020    BUN 29 10/12/2020    CREATININE 0.5 10/12/2020    GFRAA >60 10/12/2020    LABGLOM >60 10/12/2020    GLUCOSE 131 10/12/2020    PROT 4.2 10/12/2020    LABALBU 1.8 10/12/2020    CALCIUM 7.5 10/12/2020    BILITOT 1.0 10/12/2020    ALKPHOS 280 10/12/2020    AST 49 10/12/2020    ALT 17 10/12/2020       Current Facility-Administered Medications:     sodium phosphate 20 mmol in dextrose 5 % 250 mL IVPB, 20 mmol, Intravenous, Once, Marisa Oconnor MD, Last Rate: 62.5 mL/hr at 10/12/20 0757, 20 mmol at 10/12/20 0757    calcium gluconate 1 g in dextrose 5 % 100 mL IVPB, 1 g, Intravenous, Once, Marisa Oconnor MD, Last Rate: 100 mL/hr at 10/12/20 0756, 1 g at 10/12/20 0756    PN-Adult  3-in-1 Central Line (Standard), , Intravenous, Continuous TPN, Steffi Wang MD, Last Rate: 75 mL/hr at 10/11/20 2000    fluconazole (DIFLUCAN) in 0.9 % sodium chloride IVPB 400 mg, 400 mg, Intravenous, Q24H, Marvetta Severs, MD, Last Rate: 100 mL/hr at 10/11/20 1155, 400 mg at 10/11/20 1155    pantoprazole (PROTONIX) injection 40 mg, 40 mg, Intravenous, Daily, Lewis Meza MD, 40 mg at 10/12/20 0757    argatroban infusion 50mg in 0.9% sodium chloride 50 mL (premix), 0.5 mcg/kg/min, Intravenous, Continuous, Lewis Meza MD, Last Rate: 1.6 mL/hr at 10/12/20 0657, 0.25 mcg/kg/min at 10/12/20 0657    0.9 % sodium chloride bolus, 20 mL, Intravenous, Once, Beau Hernandez DO    metoprolol (LOPRESSOR) injection 2.5 mg, 2.5 mg, Intravenous, Q6H, Angelo Mera MD, 2.5 mg at 10/12/20 0649    fentaNYL (SUBLIMAZE) injection 25 mcg, 25 mcg, Intravenous, Q4H PRN, Lewis Meza MD, 25 mcg at 10/12/20 0322    insulin lispro (HUMALOG) injection vial 0-6 Units, 0-6 Units, Subcutaneous, Q6H, Alok Bonilla DO, 1 Units at 10/12/20 0000    0.9 % sodium chloride bolus, 20 mL, Intravenous, Once, Veronica Leahy MD    0.9 % sodium chloride bolus, 20 mL, Intravenous, Once, Marisa Oconnor MD    sodium chloride flush 0.9 % injection 10 mL, 10 mL, Intravenous, BID, Neymar Rodríguez MD, 10 mL at 10/12/20 0757    piperacillin-tazobactam (ZOSYN) 3.375 g in dextrose 5 % 50 mL IVPB argatroban protocol  TPN  HIT antibody- pending  No signs of hemolysis   Full supportive care       Electronically signed by ORIN Calzada NP on 10/12/2020 at 8:24 AM

## 2020-10-12 NOTE — PROGRESS NOTES
Critical Care Team - Daily Progress Note      Date and time: 10/12/2020 7:22 AM  Patient's name:  Liliane Valiente  Medical Record Number: [id number]  Patient's account/billing number: [de-identified]  Patient's YOB: 1935  Age: 82 y.o. Date of Admission: 10/4/2020  4:26 PM  Length of stay during current admission: 8      Primary Care Physician: Leela Roca MD  ICU Attending Physician: Dr. Rachel Mckenzie    Code Status: Limited    Reason for ICU admission:   Septic shock due to c.diff colitis  Post-operative respiratory failure requiring mechanical ventilation      SUBJECTIVE:   The patient is a 82 y. o. female with significant past medical history of A-fib, HTN, and anemia that presented to the ER due to hypotension and diarrhea. Patient was recently diagnosed with UTI and was placed on antibiotics. Patient has been having diarrhea for a few weeks. Patient has been having diffuse cramping abdominal pain with no radiation. Patient was found to be guaiac positive in the ER. Patient was transferred out of the ICU on 10/06. On 10/07, patient had an increase in abdominal pain and peritonitis, so she was brought to the OR. Patient underwent a total colectomy with abthera open abdominal dressing. Patient was re-admitted to the ICU due to need for mechanical ventilation.     OVERNIGHT EVENTS:        10/12/2020  Patient had a low grade temp overnight; T-max 99.9. Some agitation, but no purposeful movements. JULISSA drain is draining 80 cc per hour. Wound vac not draining much. Off pressors, off fentanyl.      AWAKE & FOLLOWING COMMANDS:  [x] No   [] Yes    CURRENT VENTILATION STATUS:     [x] Ventilator  [] BIPAP  [] Nasal Cannula [] Room Air      IF INTUBATED, ET TUBE MARKING AT LOWER LIP:       cms    SECRETIONS Amount:  [] Small [] Moderate  [] Large  [x] None  Color:     [] White [] Colored  [] Bloody    SEDATION:  RAAS Score: -4  [] Propofol gtt  [] Versed gtt  [] Ativan gtt   [] No Sedation    PARALYZED:  [x] No [] Yes    DIARRHEA:                [x] No                [] Yes  (C. Difficile status: [] positive                                                                                                                       [] negative                                                                                                                     [] pending)    VASOPRESSORS:  [x] No    [] Yes    If yes -   [] Levophed       [] Dopamine     [] Vasopressin       [] Dobutamine  [] Phenylephrine         [] Epinephrine    CENTRAL LINES:     [] No   [x] Yes   (Date of Insertion: 10/04)           If yes -     [x] Right IJ     [] Left IJ [] Right Femoral [] Left Femoral                   [] Right Subclavian [] Left Subclavian       WEST'S CATHETER:   [] No   [x] Yes  (Date of Insertion:10/04)     URINE OUTPUT:            [] Good   [x] Low              [] Anuric      OBJECTIVE:     VITAL SIGNS:  BP (!) 107/55   Pulse 131   Temp 98 °F (36.7 °C) (Axillary)   Resp 18   Ht 5' 6\" (1.676 m)   Wt 238 lb 5.1 oz (108.1 kg)   SpO2 96%   BMI 38.47 kg/m²   Tmax over 24 hours:  Temp (24hrs), Av.7 °F (37.1 °C), Min:98 °F (36.7 °C), Max:99.3 °F (37.4 °C)      Patient Vitals for the past 6 hrs:   BP Temp Temp src Pulse Resp SpO2   10/12/20 0600 -- -- -- 131 18 96 %   10/12/20 0500 -- -- -- 109 16 97 %   10/12/20 0400 (!) 107/55 98 °F (36.7 °C) Axillary 110 16 97 %   10/12/20 0310 -- -- -- 130 21 99 %   10/12/20 0300 -- -- -- 141 18 95 %   10/12/20 0200 -- -- -- 111 16 94 %         Intake/Output Summary (Last 24 hours) at 10/12/2020 0722  Last data filed at 10/12/2020 0600  Gross per 24 hour   Intake 3177 ml   Output 2310 ml   Net 867 ml     Wt Readings from Last 2 Encounters:   10/12/20 238 lb 5.1 oz (108.1 kg)     Body mass index is 38.47 kg/m².         PHYSICAL EXAMINATION:  BP (!) 107/55   Pulse 131   Temp 98 °F (36.7 °C) (Axillary)   Resp 18   Ht 5' 6\" (1.676 m)   Wt 238 lb 5.1 oz (108.1 kg)   SpO2 96%   BMI 38.47 kg/m² Physical Exam  Vitals signs reviewed. Constitutional:       Appearance: She is ill-appearing. Comments: Intubated; minimal response to pain; RASS -3   HENT:      Mouth/Throat:      Mouth: Mucous membranes are dry. Eyes:      Conjunctiva/sclera: Conjunctivae normal.      Pupils: Pupils are equal, round, and reactive to light. Cardiovascular:      Rate and Rhythm: Regular rhythm. Tachycardia present. Heart sounds: No murmur. Pulmonary:      Effort: Pulmonary effort is normal.      Breath sounds: Rhonchi (diffuse) present. Abdominal:      Palpations: Abdomen is soft. Comments: Wound vac in place, ostomy draining brown fluid, incisions CDI   Musculoskeletal:      Right lower leg: 3+ Pitting Edema present. Left lower leg: 3+ Pitting Edema present. Skin:     General: Skin is warm. Coloration: Skin is pale.       Comments: Improvement in bluing of fingers on left hand   Neurological:      Comments: Intubated; minimal response to pain         Any additional physical findings:    MEDICATIONS:    Scheduled Meds:   sodium phosphate IVPB  20 mmol Intravenous Once    insulin regular  10 Units Subcutaneous Once    calcium gluconate IVPB  1 g Intravenous Once    dextrose  25 g Intravenous Once    fluconazole  400 mg Intravenous Q24H    pantoprazole  40 mg Intravenous Daily    sodium chloride  20 mL Intravenous Once    metoprolol  2.5 mg Intravenous Q6H    insulin lispro  0-6 Units Subcutaneous Q6H    sodium chloride  20 mL Intravenous Once    sodium chloride  20 mL Intravenous Once    sodium chloride flush  10 mL Intravenous BID    piperacillin-tazobactam  3.375 g Intravenous Q8H    chlorhexidine  15 mL Mouth/Throat BID    metroNIDAZOLE  500 mg Intravenous Q8H     Continuous Infusions:   PN-Adult  3 IN 1 Central Line (Standard) 75 mL/hr at 10/11/20 2000    argatroban infusion 0.25 mcg/kg/min (10/12/20 0657)    sodium chloride 12.5 mL/hr at 10/11/20 9745    norepinephrine Stopped (10/09/20 1226)     PRN Meds:   fentanNYL, 25 mcg, Q4H PRN  artificial tears, , PRN          VENT SETTINGS (Comprehensive) (if applicable):  Vent Information  $Ventilation: $Subsequent Day  Equipment ID: [id number]  Equipment Changed: Suction catheter  Vent Type: [id number]  Vent Mode: AC/VC  Vt Ordered: 400 mL  Rate Set: 16 bmp  Peak Flow: 55 L/min  Pressure Support: 0 cmH20  FiO2 : 40 %  SpO2: 96 %  SpO2/FiO2 ratio: 240  Sensitivity: 3  PEEP/CPAP: 5  I Time/ I Time %: 0 s  Humidification Source: HME  Additional Respiratory  Assessments  Pulse: 131  Resp: 18  SpO2: 96 %  Position: Semi-Forbes's  Humidification Source: HME  Oral Care: Mouth swabbed, Mouth moisturizer, Mouth suctioned, Suction toothette  Subglottic Suction Done?: No  Cuff Pressure (cm H2O): 29 cm H2O    ABGs:     Recent Labs     10/12/20  0612   PH 7.436   PCO2 38.8   PO2 127.7*   HCO3 25.5   BE 1.3   O2SAT 98.6*         Laboratory findings:    Complete Blood Count:   Recent Labs     10/10/20  1900 10/11/20  0600 10/11/20  2220 10/12/20  0545   WBC 21.2* 23.6*  --  17.7*   HGB 7.6* 7.8* 7.4* 7.6*   HCT 23.9* 24.6* 23.3* 24.0*   PLT 41* 44*  --  29*        Last 3 Blood Glucose:   Recent Labs     10/10/20  0513 10/11/20  0600 10/12/20  0545   GLUCOSE 174* 146* 131*        PT/INR:    Lab Results   Component Value Date    PROTIME 49.3 10/12/2020    INR 4.0 10/12/2020     PTT:    Lab Results   Component Value Date    APTT 106.7 10/12/2020       Comprehensive Metabolic Profile:   Recent Labs     10/10/20  0513 10/11/20  0600 10/12/20  0545    139 144   K 4.0 5.0 5.7*   * 110* 113*   CO2 26 27 28   BUN 27* 28* 29*   CREATININE 0.7 0.7 0.5   GLUCOSE 174* 146* 131*   CALCIUM 7.0* 7.4* 7.5*   PROT 4.2* 4.1* 4.2*   LABALBU 2.3* 2.0* 1.8*   BILITOT 1.6* 1.1 1.0   ALKPHOS 175* 224* 280*   AST 27 43* 49*   ALT 13 16 17      Magnesium:   Lab Results   Component Value Date    MG 2.1 10/12/2020     Phosphorus:   Lab Results   Component Value Date    PHOS 1.5 10/12/2020     Ionized Calcium:   Lab Results   Component Value Date    CAION 1.17 10/12/2020        Urinalysis:     Troponin: No results for input(s): TROPONINI in the last 72 hours. Microbiology:    Cultures during this admission:     Blood cultures:                 [x] None drawn      [] Negative             []  Positive (Details:  )  Urine Culture:                   [x] None drawn      [] Negative             []  Positive (Details:  )  Sputum Culture:               [x] None drawn       [] Negative             []  Positive (Details:  )   Endotracheal aspirate:     [x] None drawn       [] Negative             []  Positive (Details:  )     Other pertinent Labs:       Radiology/Imaging:   Xr Abdomen (kub) (single Ap View)    Result Date: 10/7/2020  EXAMINATION: ONE SUPINE XRAY VIEW(S) OF THE ABDOMEN 10/7/2020 10:01 am COMPARISON: Abdomen radiograph 10/06/2020 and abdomen CT 10/04/2020 HISTORY: ORDERING SYSTEM PROVIDED HISTORY: abd pain 10/10 TECHNOLOGIST PROVIDED HISTORY: Reason for exam:->abd pain 10/10 FINDINGS: Again noted is gas-filled distention of the transverse colon without significant change from yesterday x-ray. Mural thumbprinting sign is again present in the transverse, descending, and sigmoid colon region. Slight prominence of small bowel gas without distention. No radiographic evidence of mass or urologically significant calcification. Left lung base remains opacified with consolidation, non-specific. Transverse, descending, and sigmoid colon mural thickening may reflect edema and/or inflammation/infection, without significant interval change. Slight prominence of gas throughout the small intestine, without gross distension, suggestive of paralytic ileus. Persisting left lung base opacity may be atelectasis with pleural effusion.  Differential again includes infectious infiltrate     Xr Abdomen (kub) (single Ap View)    Result Date: 10/6/2020  EXAMINATION: ONE SUPINE XRAY VIEW(S) OF THE ABDOMEN 10/6/2020 11:11 am COMPARISON: CT abdomen pelvis October 4, 2020 HISTORY: ORDERING SYSTEM PROVIDED HISTORY: white count TECHNOLOGIST PROVIDED HISTORY: Reason for exam:->white count FINDINGS: The stomach is not dilated. There is a prominent loop of transverse colon with thumbprinting sign. The small bowels are normal caliber. No abnormal intra-abdominal calcifications. Thickening of the transverse colon wall. Consistent with inflammation identified on prior CT. Ct Head Wo Contrast    Result Date: 10/11/2020  EXAMINATION: CT OF THE HEAD WITHOUT CONTRAST  10/11/2020 11:20 am TECHNIQUE: CT of the head was performed without the administration of intravenous contrast. Dose modulation, iterative reconstruction, and/or weight based adjustment of the mA/kV was utilized to reduce the radiation dose to as low as reasonably achievable. COMPARISON: None HISTORY: ORDERING SYSTEM PROVIDED HISTORY: change in mental status TECHNOLOGIST PROVIDED HISTORY: Reason for exam:->change in mental status Has a \"code stroke\" or \"stroke alert\" been called? ->No Initial exam FINDINGS: BRAIN/VENTRICLES: Focal hypodensity within the left frontal lobe deep white matter extending into the anterior margin of the centrum semiovale. This is best identified image #42. No mass effect or midline shift. No intracranial or extracranial hemorrhage. The ventricles are intact without acute hydrocephalus. The basilar cisterns are patent. ORBITS: The visualized portion of the orbits demonstrate no acute abnormality. SINUSES: The visualized paranasal sinuses and mastoid air cells demonstrate no acute abnormality. SOFT TISSUES/SKULL:  No acute abnormality of the visualized skull or soft tissues. Focal hypodensity within the left frontal lobe white matter. Correlate MRI imaging as subacute infarct cannot be excluded.      Ct Abdomen Pelvis W Iv Contrast Additional Contrast? None    Result Date: 10/4/2020  EXAMINATION: CT OF THE ABDOMEN AND PELVIS WITH CONTRAST 10/4/2020 7:53 pm TECHNIQUE: CT of the abdomen and pelvis was performed with the administration of intravenous contrast. Multiplanar reformatted images are provided for review. Dose modulation, iterative reconstruction, and/or weight based adjustment of the mA/kV was utilized to reduce the radiation dose to as low as reasonably achievable. COMPARISON: None. HISTORY: ORDERING SYSTEM PROVIDED HISTORY: abd pain, hypotension, gi bleed TECHNOLOGIST PROVIDED HISTORY: Reason for exam:->abd pain, hypotension, gi bleed Additional Contrast?->None FINDINGS: The lung bases demonstrate cardiomegaly with a tiny pericardial effusion. There is minimal atelectasis and pleural effusion in the lung bases left more than right. The liver is fatty infiltrated. The gallbladder is distended without acute inflammation. Consider ultrasonography for better assessment of gallbladder. Spleen, pancreas, the adrenals and the kidneys are normal except for a 1.5 cm cystic lesion in the right kidney. There is degenerative changes in the lumbar spine. .  The urinary bladder is contracted with a Martin catheter and wall thickening. There is diffuse thickening and inflammation of the transverse colon, descending and rectosigmoid colon with hyperdensity concerning for active bleeding/contrast extravasation. There is inflammatory changes in the presacral area. A tiny air bubble is identified in the rectovesical pouch which could represent waled off microperforation. Appendix cannot be identified. There is some inflammatory changes in the gluteus region     Diffuse inflammation of the transverse colon, descending and rectosigmoid colon with wall thickening and edema with active contrast extravasation/bleeding. Hemorrhagic diverticulitis or colitis are considered. There is some edema in the presacral area and tiny amount of air collection in the rectovesical pouch. Walled-off microperforation is considered.  Distended gallbladder. Atelectasis/pleural effusion in the left lung base. RECOMMENDATIONS: The clinical service was notified     Ct Abdomen Pelvis W Iv Contrast Additional Contrast? None    Addendum Date: 10/4/2020    ADDENDUM: In the title of the examination, disregard the CT abdomen and pelvis. Result Date: 10/4/2020  EXAMINATION: ONE XRAY VIEW OF THE CHEST; CT OF THE ABDOMEN AND PELVIS WITH CONTRAST 10/4/2020 7:06 pm COMPARISON: October 4, 2020 HISTORY: ORDERING SYSTEM PROVIDED HISTORY: post R IJ CVC placement TECHNOLOGIST PROVIDED HISTORY: Reason for exam:->post R IJ CVC placement     There is a borderline cardiac size. There is atelectasis in the lung bases with a small left pleural effusion. Left subclavian pacemaker is unchanged. Right IJ central line is noted with the tip at the atrial caval junction. There is no pneumothorax. RECOMMENDATION: Atelectasis and pleural effusion in the lung bases more on the left side. Right IJ central line has noted without complications. Us Gallbladder Ruq    Result Date: 10/6/2020  EXAMINATION: RIGHT UPPER QUADRANT ULTRASOUND 10/5/2020 10:46 pm COMPARISON: CT abdomen pelvis 10/04/2020 HISTORY: ORDERING SYSTEM PROVIDED HISTORY: Elevated alk phos, LUQ pain TECHNOLOGIST PROVIDED HISTORY: Reason for exam:->Elevated alk phos, LUQ pain What reading provider will be dictating this exam?->CRC FINDINGS: LIVER:  Normal liver size and contour. Increased parenchymal echogenicity. No focal lesion. BILIARY SYSTEM:  No intra or extrahepatic bile duct dilatation. Common bile duct measures 5 mm. Gallbladder contains shadowing dependent gallstones. No wall thickening or pericholecystic fluid. Reported positive sonographic Hopson's sign. RIGHT KIDNEY: Right kidney measures 10.1 x 4.5 x 5.2 cm. Anechoic cyst at the superior pole measures up to 15 mm. An echogenic mid polar region focus measures 9 x 9 x 9 mm and correlates with hypodensity on CT, probably a tiny angiomyolipoma.  PANCREAS: Visualized portions of the pancreas are unremarkable. OTHER: Right pleural effusions suspected. No ascites. Cholelithiasis with positive sonographic Hopson's sign but no wall thickening or pericholecystic inflammatory changes, altogether equivocal for cholecystitis. Consider functional analysis with nuclear medicine HIDA scan. Hepatic steatosis. 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 Suspected small right pleural effusion. Xr Chest Portable    Result Date: 10/11/2020  EXAMINATION: ONE XRAY VIEW OF THE CHEST 10/11/2020 6:31 am COMPARISON: Yesterday HISTORY: ORDERING SYSTEM PROVIDED HISTORY: resp failure TECHNOLOGIST PROVIDED HISTORY: Reason for exam:->resp failure FINDINGS: Patient is rotated. Left infraclavicular cardiac pacemaker with 2 lead wires is noted. The endotracheal tube terminates 5 cm cephalad to the bernadette. Nasogastric tube extends below the hemidiaphragm. Right jugular central line terminates over the distal aspect of the superior vena cava. Cardiac silhouette is normal.  There hazy opacities in lungs. Aeration of right base is improved versus more dependently layered effusion. There is no pneumothorax. Supportive devices are in stable positions. Bibasilar effusions and airspace disease/atelectasis. Improved aeration of the right base in the interval.     Xr Chest Portable    Result Date: 10/10/2020  EXAMINATION: ONE XRAY VIEW OF THE CHEST 10/10/2020 7:01 am COMPARISON: 10/09/2020 HISTORY: ORDERING SYSTEM PROVIDED HISTORY: resp failure TECHNOLOGIST PROVIDED HISTORY: Reason for exam:->resp failure FINDINGS: The endotracheal tube tip is 4.8 cm above the bernadette. Nasogastric tube, right central line and pacemaker are unchanged. Is there is new airspace disease in the right lower lobe which could be atelectasis versus infiltrate. Is there are bilateral small pleural effusions which is stable on the left and increased on the right.      Increased right lower lobe infiltrate versus atelectasis and small pleural effusion on the right. Unchanged left basilar airspace disease and small pleural effusion. Xr Chest Portable    Result Date: 10/9/2020  EXAMINATION: ONE XRAY VIEW OF THE CHEST 10/9/2020 8:59 am COMPARISON: October 9, 2020 HISTORY: ORDERING SYSTEM PROVIDED HISTORY: s/p ET advancement TECHNOLOGIST PROVIDED HISTORY: Reason for exam:->s/p ET advancement Follow-up FINDINGS: Tip of the endotracheal tube terminates 3.3 cm above the bernadette. The NG tube tip is not identified. The right IJ central line catheter is stable. Low lung volumes with right infrahilar atelectasis. No pulmonary edema or pneumothorax. Stable cardiomediastinal silhouette. ET tube advancement. Right lower lobe atelectasis. Xr Chest Portable    Result Date: 10/9/2020  EXAMINATION: ONE XRAY VIEW OF THE CHEST 10/9/2020 7:02 am COMPARISON: 10/07/2020 HISTORY: ORDERING SYSTEM PROVIDED HISTORY: intubated TECHNOLOGIST PROVIDED HISTORY: Reason for exam:->intubated FINDINGS: Lines/tubes are appropriate. Moderate left effusion and left lower lobe infiltrate are unchanged. There is a tiny right effusion. Heart size is normal     No interval change     Xr Chest Portable    Result Date: 10/8/2020  EXAMINATION: ONE XRAY VIEW OF THE CHEST 10/8/2020 6:47 am COMPARISON: 10/07/2020 HISTORY: ORDERING SYSTEM PROVIDED HISTORY: intubated TECHNOLOGIST PROVIDED HISTORY: Reason for exam:->intubated FINDINGS: There is stable position of the support lines and tubes. There is minimal atelectasis seen within the right lung base unchanged when compared to prior study. There is a small left pleural effusion and left lung base atelectasis. Left upper lobe is clear. These findings are also unchanged. 1. No interval change in the small left pleural effusion and left lung base atelectasis. 2. No change in the minimal right lung base atelectasis.      Xr Chest Portable    Result Date: 10/7/2020  EXAMINATION: ONE XRAY VIEW OF THE CHEST 10/7/2020 8:17 pm COMPARISON: October 4, 2020 HISTORY: ORDERING SYSTEM PROVIDED HISTORY: intbuated TECHNOLOGIST PROVIDED HISTORY: Reason for exam:->intbuated FINDINGS: Left pacemaker is present. Endotracheal tube is present with distal tip approximately 3 cm above the bernadette. Nasogastric tube courses below the level of the diaphragm. Right IJ central venous catheter is present with distal tip at location of superior vena cava. There are persistent opacities at left lung base silhouetting left hemidiaphragm and left costophrenic sulcus. The heart appears normal in size. No pneumothorax. Opacities are present in left lung base which appear to have increased in could suggest increasing pneumonia, atelectasis, or effusion. Xr Chest Portable    Addendum Date: 10/4/2020    ADDENDUM: In the title of the examination, disregard the CT abdomen and pelvis. Result Date: 10/4/2020  EXAMINATION: ONE XRAY VIEW OF THE CHEST; CT OF THE ABDOMEN AND PELVIS WITH CONTRAST 10/4/2020 7:06 pm COMPARISON: October 4, 2020 HISTORY: ORDERING SYSTEM PROVIDED HISTORY: post R IJ CVC placement TECHNOLOGIST PROVIDED HISTORY: Reason for exam:->post R IJ CVC placement     There is a borderline cardiac size. There is atelectasis in the lung bases with a small left pleural effusion. Left subclavian pacemaker is unchanged. Right IJ central line is noted with the tip at the atrial caval junction. There is no pneumothorax. RECOMMENDATION: Atelectasis and pleural effusion in the lung bases more on the left side. Right IJ central line has noted without complications. Xr Chest 1 View    Result Date: 10/4/2020  EXAMINATION: ONE XRAY VIEW OF THE CHEST 10/4/2020 4:06 pm COMPARISON: None. HISTORY: ORDERING SYSTEM PROVIDED HISTORY: fatigue TECHNOLOGIST PROVIDED HISTORY: Reason for exam:->fatigue FINDINGS: Cardiac silhouette is not enlarged. Dual-chamber pacemaker in place. Pulmonary vasculature within normal limits. Blunting of the left costophrenic angle and opacity along the left costal margin most likely small left pleural effusion. No airspace consolidation. Suspect left pleural effusion. No airspace consolidation. Dual-chamber pacemaker in place. Follow-up PA and lateral radiographs may be helpful in further evaluation. Us Dup Upper Extremity Left Venous    Result Date: 10/9/2020  EXAMINATION: VENOUS ULTRASOUND OF THE LEFT UPPER EXTREMITY 10/9/2020 10:12 am COMPARISON: None. HISTORY: ORDERING SYSTEM PROVIDED HISTORY: Left arm, r/o DVT TECHNOLOGIST PROVIDED HISTORY: Reason for exam:->Left arm, r/o DVT What reading provider will be dictating this exam?->CRC Initial exam FINDINGS: Images demonstrate occlusive thrombus within the left proximal to mid brachial vein. The remainder of the deep venous system within the left upper extremity is patent throughout. Occlusive thrombus left mid to proximal brachial vein. The findings were sent to the Radiology Results [address] at 11:31 am on 10/9/2020to be communicated to a licensed caregiver. Xr Chest Abdomen Ng Placement    Result Date: 10/7/2020  EXAMINATION: ONE SUPINE XRAY VIEW(S) OF THE ABDOMEN 10/7/2020 2:07 pm COMPARISON: None. HISTORY: ORDERING SYSTEM PROVIDED HISTORY: NGT Placement TECHNOLOGIST PROVIDED HISTORY: Reason for exam:->NGT Placement What reading provider will be dictating this exam?->CRC FINDINGS: Nasogastric tube courses below the level of the diaphragm with distal tip in the expected location of the stomach. Satisfactory position of nasogastric tube. Us Dup Lower Extremities Bilateral Venous    Result Date: 10/9/2020  EXAMINATION: DUPLEX VENOUS ULTRASOUND OF THE BILATERAL LOWER EXTREMITIES, 10/9/2020 10:12 am TECHNIQUE: Duplex ultrasound and Doppler images were obtained of the bilateral lower extremities COMPARISON: None.  HISTORY: ORDERING SYSTEM PROVIDED HISTORY: r/o DVT TECHNOLOGIST PROVIDED HISTORY: Reason for exam:->r/o DVT What reading provider will be dictating this exam?->CRC FINDINGS: The visualized veins of the bilateral lower extremities are patent and free of echogenic thrombus. The veins are normally compressible and have normal phasic flow. Examination is limited. Due to edema calf veins were not visualized. No evidence of DVT in either lower extremity. Chest Xray (10/12/2020): Moderate sized bilateral pleural effusions are noted. Robe Nava are findings of    mild vascular congestion.  There is no right or left upper lobe infiltrate. ASSESSMENT:     Neuro   Intubated      -Minimal response to pain, some agitation            -On Fentanyl PRN for agitation            -RASS -3      -CT of head showed focal hypodensity within left frontal lobe white matter      -MRI w/o contrast ordered      -Neurology not available this week in [facility]        Respiratory   Mechanical ventilation            -Intubated in OR 10/07-day 6            -Wean as tolerated             -CXR daily             -ABG 7.43/38.8/127.7/25.5              Cardiovascular   A-fib with RVR   Hx of A-fib, on Sotalol and Metoprolol at home            -on Metoprolol 2.5 mg every 6 hours      -Given dose of Digoxin 250 mcg today            -Cardiology following     Septic Shock  Likely due to fulminant colitis    Resolved               -Off pressors     Fluid Overload   -Start Bumex drip   -Start Albumin 25 g IV every 8 hours     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    Fulminant Colitis  S/p total colectomy on 10/07, s/p osteomy/closure 10/10             -General surgery following       -Wound vac with minimal drainage, JULISSA drain draining 80 cc/hour     Protonix PPx     Renal   QI   Resolved              -Cre 0.5 today                  Hypophosphatemia            -Phos 1.5           -Sodium Phosphate 20 mmol given today           -Replete as needed     Hypomagnesemia   Resolved           -Mag 2.1           -Keep magnesium > 2    Hyperkalemia  Likely due to supplementation     -Potassium 5.7     -Treated with Insulin 10 U, calcium gluconate, and D50 once     -Recheck after treatment                 Infectious Disease   Septic Shock   Likely due to c,diff colitis with toxic megacolon   Acute peritonitis secondary to ruptured viscous   Improving              -WBC improving 17.7              -On Zosyn day 5, Diflucan day 2        -Flagyl stopped by ID              -Body fluid culture grew candida albicans and E.coli     Fulminant Colitis   Hx of Ulcerative colitis  S/p total colectomy on 10/07              -C-diff positive               -On Zosyn day 5, Diflucan day 2          Hematology/Oncology   Anemia  Likely multi-factorial- GI bleed, anemia of chronic disease              -Hgb 7. 6              -Transfuse < 7         -Received 2 units PRBCs this admission              -CBC daily      Thrombocytopenia/BOY   Likely due to septic shock              -Platelets 29 today              -Patient received 6 units of platelets this admission              -On Argatroban drip- stop for line placement        -Given 2 units of FFP today for line placement     Occlusive thrombus left brachial vein             -On Agratroban drip        Endocrine   Elevated blood glucose   -LDSSI   -Keep blood glucose < 180     Social/Spiritual/DNR/Other   Limited code  NPO  Continue TPN     Lines:  CVC RIJ 10/04/2020- day 9; replace today with [facility]   Martin 10/04/2020- day 7  Midline Right- day 9  Left radial Art- day 5     Consults:  Infectious disease  Cardiology  GI  General surgery  Nephrology       PLAN:     WEAN PER PROTOCOL:  [x] No   [] Yes  [] N/A    DISCONTINUE ANY LABS:   [x] No   [] Yes    ICU PROPHYLAXIS:  Stress ulcer:  [x] PPI Agent  [] D1Vmdxh [] Sucralfate  [] Other:  VTE:   [x] Argatroban   [] Unfract.  Heparin Subcut  [] EPC Cuffs    NUTRITION:  [] NPO [] Tube Feeding (Specify: ) [x] TPN  [] PO (Diet: PN-Adult  3-in-1 Central Line (Standard))    HOME MEDICATIONS RECONCILED: [x] No  [] Yes    INSULIN DRIP:   [x] No   [] Yes    CONSULTATION NEEDED:  [x] No   [] Yes    FAMILY UPDATED:    [] No   [x] Yes    TRANSFER OUT OF ICU:   [x] No   [] Yes    ADDITIONAL PLAN:    Elliot Wilkinson MD, PGY-2          10/12/2020, 7:22 AM       Addendum:    Patient can't get MRI due to her pacemaker. Will repeat CT head in a.m. I personally saw, examined and provided care for the patient. Radiographs, labs and medication list were reviewed by me independently. I spoke with bedside nursing, therapists and consultants. Critical care services and times documented are independent of procedures and multidisciplinary rounds with Residents. Additionally comprehensive, multidisciplinary rounds were conducted with the MICU team. The case was discussed in detail and plans for care were established. Review of Residents documentation was conducted and revisions were made as appropriate. I agree with the above documented exam, problem list and plan of care.   Joslyn Zuleta MD   CCT excluding procedures:45'

## 2020-10-12 NOTE — PROGRESS NOTES
[address] [place] [address] [facility]  Progress Note    SUBJECTIVE:  Chief Complaint   Patient presents with    Hypotension    Diarrhea     The patient is still in the ICU. She is still intubated. No longer sedated but remains unresponsive. Mildly hypotensive but off vasopressors. Tolerating antibiotics. Review of systems:  As stated above in the chief complaint, otherwise negative. Medications:  Scheduled Meds:   sodium phosphate IVPB  20 mmol Intravenous Once    calcium gluconate IVPB  1 g Intravenous Once    dextrose  25 g Intravenous Once    insulin regular  10 Units Intravenous Once    fluconazole  400 mg Intravenous Q24H    pantoprazole  40 mg Intravenous Daily    sodium chloride  20 mL Intravenous Once    metoprolol  2.5 mg Intravenous Q6H    insulin lispro  0-6 Units Subcutaneous Q6H    sodium chloride  20 mL Intravenous Once    sodium chloride  20 mL Intravenous Once    sodium chloride flush  10 mL Intravenous BID    piperacillin-tazobactam  3.375 g Intravenous Q8H    chlorhexidine  15 mL Mouth/Throat BID    metroNIDAZOLE  500 mg Intravenous Q8H     Continuous Infusions:   PN-Adult  3 IN 1 Central Line (Standard) 75 mL/hr at 10/11/20 2000    argatroban infusion 0.25 mcg/kg/min (10/12/20 0657)    sodium chloride 12.5 mL/hr at 10/11/20 2325    norepinephrine Stopped (10/09/20 1226)     PRN Meds:fentanNYL, [DISCONTINUED] lubrifresh P.M. **AND** artificial tears    OBJECTIVE:  BP (!) 107/55   Pulse 131   Temp 98 °F (36.7 °C) (Axillary)   Resp 18   Ht 5' 6\" (1.676 m)   Wt 238 lb 5.1 oz (108.1 kg)   SpO2 96%   BMI 38.47 kg/m²   Temp  Av.7 °F (37.1 °C)  Min: 98 °F (36.7 °C)  Max: 99.3 °F (37.4 °C)  Constitutional: The patient is lying in bed in the ICU. She is intubated and unresponsive. Daughter in room. Skin: Warm and dry. No rashes were noted. HEENT: Round pupils. Moist mucous membranes. No ulcerations or thrush. ET tube. OG tube.   Neck: Supple to movements. Chest: No respiratory distress. Good breath sounds. No crackles. Left pacer. Cardiovascular: Heart sounds rhythmic and regular. Abdomen: Abdomen shows a larger wound VAC. Absent bowel sounds. Right ileostomy. 2 JULISSA drains. The left has a fair amount of serosanguineous fluid. The right has minimal bloody fluid. Extremities: Minimal edema. Lines: Right IJ TLC 10/4/2020. Right midline 10/7/2020. Left radial arterial line 10/7/2020. Martin catheter with clear urine.     Laboratory and Tests Review:  Lab Results   Component Value Date    WBC 17.7 (H) 10/12/2020    WBC 23.6 (H) 10/11/2020    WBC 21.2 (H) 10/10/2020    HGB 7.6 (L) 10/12/2020    HCT 24.0 (L) 10/12/2020    MCV 93.8 10/12/2020    PLT 29 (L) 10/12/2020     Lab Results   Component Value Date    NEUTROABS 22.18 (H) 10/11/2020    NEUTROABS 19.72 (H) 10/10/2020    NEUTROABS 22.90 (H) 10/10/2020     No results found for: [facility]  Lab Results   Component Value Date    ALT 17 10/12/2020    AST 49 (H) 10/12/2020    ALKPHOS 280 (H) 10/12/2020    BILITOT 1.0 10/12/2020     Lab Results   Component Value Date     10/12/2020    K 5.7 10/12/2020    K 3.8 10/09/2020     10/12/2020    CO2 28 10/12/2020    BUN 29 10/12/2020    CREATININE 0.5 10/12/2020    CREATININE 0.7 10/11/2020    CREATININE 0.7 10/10/2020    GFRAA >60 10/12/2020    LABGLOM >60 10/12/2020    GLUCOSE 131 10/12/2020    PROT 4.2 10/12/2020    LABALBU 1.8 10/12/2020    CALCIUM 7.5 10/12/2020    BILITOT 1.0 10/12/2020    ALKPHOS 280 10/12/2020    AST 49 10/12/2020    ALT 17 10/12/2020     Lab Results   Component Value Date    CRP 14.3 (H) 10/06/2020    CRP 17.5 (H) 10/05/2020     Lab Results   Component Value Date    SEDRATE 6 10/06/2020    SEDRATE 10 10/05/2020     Radiology:  Chest x-ray reviewed    Microbiology:   Nares screen for MRSA: Negative  Urine culture 10/4/2020 <10,000 E. coli, Corynebacterium, yeast  Blood cultures 10/4/2020: Negative so far  Stools for C. difficile: Positive  Peritoneal fluid culture 10/7/2020: E. coli, Candida albicans, anaerobic GPR    No results for input(s): PROCAL in the last 72 hours. ASSESSMENT:  · Severe C. difficile infection with fulminant colitis  · Status post total colectomy with abdominal washout and placement of wound VAC 10/7/2020  · Status post second look exploratory laparotomy, small bowel resection, and loop ileostomy and wound VAC 10/10/2020  · Peritonitis associated to C. difficile colitis with perforation.   Intraoperative cultures growing E. coli, Candida albicans and anaerobic gram-positive rods  · Leukemoid reaction associated to fulminant colitis, improving slowly  · History of UTI, being treated with Ceftriaxone at the nursing facility  · Acute kidney injury, improving  · Possible ischemic stroke    PLAN:  · Continue Zosyn and Fluconazole  · MRI cannot be done because she has a noncompatible pacer    Karrie Hurley  7:54 AM  10/12/2020

## 2020-10-12 NOTE — CONSULTS
Nutrition Therapies:    Current Parenteral Nutrition Orders:  · Type and Formula: 3-in-1 Standard   · Duration: Continuous  · Rate/Volume: 75 ml/hr/Total Volume 1800 ml  · Goal PN Orders Provides: 1,864 kcal, 90 g protein      Anthropometric Measures:  · Height: 5' 6\" (167.6 cm)  · Current Body Weight: 238 lb 5.1 oz (108.1 kg)(Bed scale)   · Admission Body Weight: 223 lb (101.2 kg)(Per previous RD note)    · Usual Body Weight: 200 lb (90.7 kg)(Per previous RD note)     · Ideal Body Weight: 130 lbs; % Ideal Body Weight 183.3 %   · BMI: 38.5    · BMI Categories: Obese Class 1 (BMI 30.0-34.9)(Per UBW. Fluid overloaded)       Nutrition Diagnosis:   · Inadequate energy intake related to altered GI structure as evidenced by NPO or clear liquid status due to medical condition, GI abnormality      Nutrition Interventions:   Food and/or Nutrient Delivery:  Continue NPO, Continue Current Parenteral Nutrition  Nutrition Education/Counseling:  Education not indicated   Coordination of Nutrition Care:  Continued Inpatient Monitoring    Goals:  Pt to tolerate PN       Nutrition Monitoring and Evaluation:   Food/Nutrient Intake Outcomes:  Parenteral Nutrition Intake/Tolerance  Physical Signs/Symptoms Outcomes:  Biochemical Data, GI Status, Fluid Status or Edema, Hemodynamic Status, Nutrition Focused Physical Findings, Weight     Discharge Planning:     Too soon to determine     Electronically signed by Viviane Tirado RD, CNSC, LD on 10/12/20 at 12:29 PM EDT    Contact: [phone]

## 2020-10-12 NOTE — PLAN OF CARE
Problem: Inadequate oral food/beverage intake (NI-2.1)  Goal: Food and/or Nutrient Delivery  Description: Individualized approach for food/nutrient provision.   10/12/2020 1255 by Hyacinth Dillon RD, JEANNA, LD  Outcome: Met This Shift  10/12/2020 1254 by Hyacinth Dillon RD, JEANNA, LD  Outcome: Ongoing

## 2020-10-12 NOTE — PROGRESS NOTES
Department of Internal Medicine  Nephrology Attending Progress Note        SUBJECTIVE:  We are following this patient for oligo anuria. Sever fluid overlaod. The patient is intubated on ventilator. Recurrent admission. Sepsis. Suspected pseudomembranous colitis. Suspected microperforation. Post major bowel resection. Patient has sever fluid overload and reduced urine out out. Physical Exam:    VITALS:  /61   Pulse 119   Temp 98.2 °F (36.8 °C)   Resp 16   Ht 5' 6\" (1.676 m)   Wt 238 lb 5.1 oz (108.1 kg)   SpO2 100%   BMI 38.47 kg/m²   24HR INTAKE/OUTPUT:    Intake/Output Summary (Last 24 hours) at 10/12/2020 1443  Last data filed at 10/12/2020 1430  Gross per 24 hour   Intake 4002 ml   Output 2047.5 ml   Net 1954.5 ml       Constitutional:  Well built. Poorly nourished. Access Exam:  none   Cardiovascular/Edema:  Heart sounds normal. No murmur. Respiratory:  Breath sounds normal. Few rhonchi. Gastrointestinal:  Bowel sounds reduced. Bowel resection . Ileaostomy  Other:  Edema +++  Neuro: Intubated, sedated. ENT: Can not see pupillary reflexes. DATA:    CBC:   Lab Results   Component Value Date    WBC 17.7 10/12/2020    RBC 2.56 10/12/2020    HGB 7.6 10/12/2020    HCT 24.0 10/12/2020    MCV 93.8 10/12/2020    MCH 29.7 10/12/2020    MCHC 31.7 10/12/2020    RDW 19.9 10/12/2020    PLT 29 10/12/2020    MPV 10.9 10/12/2020     CMP:    Lab Results   Component Value Date     10/12/2020    K 5.3 10/12/2020    K 3.8 10/09/2020     10/12/2020    CO2 28 10/12/2020    BUN 29 10/12/2020    CREATININE 0.5 10/12/2020    GFRAA >60 10/12/2020    LABGLOM >60 10/12/2020    GLUCOSE 131 10/12/2020    PROT 4.2 10/12/2020    LABALBU 1.8 10/12/2020    CALCIUM 7.5 10/12/2020    BILITOT 1.0 10/12/2020    ALKPHOS 280 10/12/2020    AST 49 10/12/2020    ALT 17 10/12/2020       IMPRESSION/RECOMMENDATIONS:      Acute kidney injury. Oliguric renal failure. Sever edema. Hyperkalemia. Hypoalbuminemia.   - Transient. - patient on IV bumex and responding.  - On IV bumex. - Continue same.  - Continue to monitor.

## 2020-10-13 ENCOUNTER — APPOINTMENT (OUTPATIENT)
Dept: GENERAL RADIOLOGY | Age: 85
DRG: 853 | End: 2020-10-13
Payer: MEDICARE

## 2020-10-13 ENCOUNTER — APPOINTMENT (OUTPATIENT)
Dept: CT IMAGING | Age: 85
DRG: 853 | End: 2020-10-13
Payer: MEDICARE

## 2020-10-13 LAB
AADO2: 85.1 MMHG
ABO/RH: NORMAL
ALBUMIN SERPL-MCNC: 2.9 G/DL (ref 3.5–5.2)
ALP BLD-CCNC: 249 U/L (ref 35–104)
ALT SERPL-CCNC: 17 U/L (ref 0–32)
ANAEROBIC CULTURE: ABNORMAL
ANION GAP SERPL CALCULATED.3IONS-SCNC: 3 MMOL/L (ref 7–16)
ANISOCYTOSIS: ABNORMAL
ANTIBODY SCREEN: NORMAL
APTT: 80.4 SEC (ref 24.5–35.1)
APTT: 80.8 SEC (ref 24.5–35.1)
APTT: 81.6 SEC (ref 24.5–35.1)
AST SERPL-CCNC: 53 U/L (ref 0–31)
B.E.: 2.8 MMOL/L (ref -3–3)
BASOPHILS ABSOLUTE: 0 E9/L (ref 0–0.2)
BASOPHILS RELATIVE PERCENT: 0 % (ref 0–2)
BILIRUB SERPL-MCNC: 1.7 MG/DL (ref 0–1.2)
BUN BLDV-MCNC: 27 MG/DL (ref 8–23)
CALCIUM IONIZED: 1.15 MMOL/L (ref 1.15–1.33)
CALCIUM SERPL-MCNC: 8 MG/DL (ref 8.6–10.2)
CHLORIDE BLD-SCNC: 107 MMOL/L (ref 98–107)
CO2: 30 MMOL/L (ref 22–29)
COHB: 0.7 % (ref 0–1.5)
CREAT SERPL-MCNC: 0.6 MG/DL (ref 0.5–1)
CRITICAL: ABNORMAL
DATE ANALYZED: ABNORMAL
DATE OF COLLECTION: ABNORMAL
EOSINOPHILS ABSOLUTE: 0 E9/L (ref 0.05–0.5)
EOSINOPHILS RELATIVE PERCENT: 0 % (ref 0–6)
FIO2: 40 %
GFR AFRICAN AMERICAN: >60
GFR NON-AFRICAN AMERICAN: >60 ML/MIN/1.73
GLUCOSE BLD-MCNC: 137 MG/DL (ref 74–99)
HCO3: 26.7 MMOL/L (ref 22–26)
HCT VFR BLD CALC: 19.5 % (ref 34–48)
HCT VFR BLD CALC: 23.4 % (ref 34–48)
HEMOGLOBIN: 6 G/DL (ref 11.5–15.5)
HEMOGLOBIN: 7.3 G/DL (ref 11.5–15.5)
HEPARIN PF4 ANTIBODY: 0.08 OD
HHB: 0.9 % (ref 0–5)
HYPOCHROMIA: ABNORMAL
INR BLD: 3.2
LAB: ABNORMAL
LYMPHOCYTES ABSOLUTE: 2.05 E9/L (ref 1.5–4)
LYMPHOCYTES RELATIVE PERCENT: 18 % (ref 20–42)
Lab: ABNORMAL
MAGNESIUM: 2.2 MG/DL (ref 1.6–2.6)
MCH RBC QN AUTO: 29.6 PG (ref 26–35)
MCHC RBC AUTO-ENTMCNC: 30.8 % (ref 32–34.5)
MCV RBC AUTO: 96.1 FL (ref 80–99.9)
METAMYELOCYTES RELATIVE PERCENT: 2 % (ref 0–1)
METER GLUCOSE: 123 MG/DL (ref 74–99)
METER GLUCOSE: 134 MG/DL (ref 74–99)
METER GLUCOSE: 142 MG/DL (ref 74–99)
METER GLUCOSE: 143 MG/DL (ref 74–99)
METER GLUCOSE: 154 MG/DL (ref 74–99)
METER GLUCOSE: <40 MG/DL (ref 74–99)
METHB: 0.4 % (ref 0–1.5)
MODE: AC
MONOCYTES ABSOLUTE: 0.57 E9/L (ref 0.1–0.95)
MONOCYTES RELATIVE PERCENT: 5 % (ref 2–12)
NEUTROPHILS ABSOLUTE: 8.78 E9/L (ref 1.8–7.3)
NEUTROPHILS RELATIVE PERCENT: 75 % (ref 43–80)
NUCLEATED RED BLOOD CELLS: 2 /100 WBC
O2 CONTENT: 9.4 ML/DL
O2 SATURATION: 99.1 % (ref 92–98.5)
O2HB: 98 % (ref 94–97)
OPERATOR ID: ABNORMAL
ORGANISM: ABNORMAL
OVALOCYTES: ABNORMAL
PATIENT TEMP: 37 C
PCO2: 38.1 MMHG (ref 35–45)
PDW BLD-RTO: 19.6 FL (ref 11.5–15)
PEEP/CPAP: 5 CMH2O
PFO2: 3.66 MMHG/%
PH BLOOD GAS: 7.46 (ref 7.35–7.45)
PHOSPHORUS: 2.2 MG/DL (ref 2.5–4.5)
PLATELET # BLD: 29 E9/L (ref 130–450)
PLATELET CONFIRMATION: NORMAL
PMV BLD AUTO: 10.9 FL (ref 7–12)
PO2: 146.3 MMHG (ref 75–100)
POIKILOCYTES: ABNORMAL
POLYCHROMASIA: ABNORMAL
POTASSIUM SERPL-SCNC: 5.2 MMOL/L (ref 3.5–5)
PROTHROMBIN TIME: 38.8 SEC (ref 9.3–12.4)
RBC # BLD: 2.03 E12/L (ref 3.5–5.5)
RI(T): 58 %
RR MECHANICAL: 14 B/MIN
SODIUM BLD-SCNC: 140 MMOL/L (ref 132–146)
SOURCE, BLOOD GAS: ABNORMAL
TARGET CELLS: ABNORMAL
THB: 6.6 G/DL (ref 11.5–16.5)
TIME ANALYZED: 550
TOTAL PROTEIN: 5 G/DL (ref 6.4–8.3)
VT MECHANICAL: 400 ML
WBC # BLD: 11.4 E9/L (ref 4.5–11.5)

## 2020-10-13 PROCEDURE — 6360000002 HC RX W HCPCS: Performed by: INTERNAL MEDICINE

## 2020-10-13 PROCEDURE — 86923 COMPATIBILITY TEST ELECTRIC: CPT

## 2020-10-13 PROCEDURE — 36430 TRANSFUSION BLD/BLD COMPNT: CPT

## 2020-10-13 PROCEDURE — 2580000003 HC RX 258: Performed by: SPECIALIST

## 2020-10-13 PROCEDURE — 2580000003 HC RX 258: Performed by: FAMILY MEDICINE

## 2020-10-13 PROCEDURE — 85014 HEMATOCRIT: CPT

## 2020-10-13 PROCEDURE — 2580000003 HC RX 258: Performed by: INTERNAL MEDICINE

## 2020-10-13 PROCEDURE — 86900 BLOOD TYPING SEROLOGIC ABO: CPT

## 2020-10-13 PROCEDURE — 36592 COLLECT BLOOD FROM PICC: CPT

## 2020-10-13 PROCEDURE — 2000000000 HC ICU R&B

## 2020-10-13 PROCEDURE — 6370000000 HC RX 637 (ALT 250 FOR IP): Performed by: STUDENT IN AN ORGANIZED HEALTH CARE EDUCATION/TRAINING PROGRAM

## 2020-10-13 PROCEDURE — 2500000003 HC RX 250 WO HCPCS: Performed by: INTERNAL MEDICINE

## 2020-10-13 PROCEDURE — 84100 ASSAY OF PHOSPHORUS: CPT

## 2020-10-13 PROCEDURE — P9016 RBC LEUKOCYTES REDUCED: HCPCS

## 2020-10-13 PROCEDURE — C9113 INJ PANTOPRAZOLE SODIUM, VIA: HCPCS | Performed by: STUDENT IN AN ORGANIZED HEALTH CARE EDUCATION/TRAINING PROGRAM

## 2020-10-13 PROCEDURE — 6360000002 HC RX W HCPCS: Performed by: STUDENT IN AN ORGANIZED HEALTH CARE EDUCATION/TRAINING PROGRAM

## 2020-10-13 PROCEDURE — P9047 ALBUMIN (HUMAN), 25%, 50ML: HCPCS | Performed by: INTERNAL MEDICINE

## 2020-10-13 PROCEDURE — 2500000003 HC RX 250 WO HCPCS: Performed by: FAMILY MEDICINE

## 2020-10-13 PROCEDURE — 94003 VENT MGMT INPAT SUBQ DAY: CPT

## 2020-10-13 PROCEDURE — 85018 HEMOGLOBIN: CPT

## 2020-10-13 PROCEDURE — 85730 THROMBOPLASTIN TIME PARTIAL: CPT

## 2020-10-13 PROCEDURE — 97608 NEG PRS WND THER NDME>50SQCM: CPT

## 2020-10-13 PROCEDURE — 70450 CT HEAD/BRAIN W/O DYE: CPT

## 2020-10-13 PROCEDURE — 37799 UNLISTED PX VASCULAR SURGERY: CPT

## 2020-10-13 PROCEDURE — 36415 COLL VENOUS BLD VENIPUNCTURE: CPT

## 2020-10-13 PROCEDURE — 82805 BLOOD GASES W/O2 SATURATION: CPT

## 2020-10-13 PROCEDURE — 80053 COMPREHEN METABOLIC PANEL: CPT

## 2020-10-13 PROCEDURE — 71045 X-RAY EXAM CHEST 1 VIEW: CPT

## 2020-10-13 PROCEDURE — 82330 ASSAY OF CALCIUM: CPT

## 2020-10-13 PROCEDURE — 83735 ASSAY OF MAGNESIUM: CPT

## 2020-10-13 PROCEDURE — 86901 BLOOD TYPING SEROLOGIC RH(D): CPT

## 2020-10-13 PROCEDURE — 6360000002 HC RX W HCPCS: Performed by: FAMILY MEDICINE

## 2020-10-13 PROCEDURE — 82962 GLUCOSE BLOOD TEST: CPT

## 2020-10-13 PROCEDURE — 99233 SBSQ HOSP IP/OBS HIGH 50: CPT | Performed by: INTERNAL MEDICINE

## 2020-10-13 PROCEDURE — 85025 COMPLETE CBC W/AUTO DIFF WBC: CPT

## 2020-10-13 PROCEDURE — 6360000002 HC RX W HCPCS: Performed by: SPECIALIST

## 2020-10-13 PROCEDURE — 6370000000 HC RX 637 (ALT 250 FOR IP): Performed by: FAMILY MEDICINE

## 2020-10-13 PROCEDURE — 86850 RBC ANTIBODY SCREEN: CPT

## 2020-10-13 PROCEDURE — 85610 PROTHROMBIN TIME: CPT

## 2020-10-13 RX ORDER — DIGOXIN 0.25 MG/ML
250 INJECTION INTRAMUSCULAR; INTRAVENOUS ONCE
Status: COMPLETED | OUTPATIENT
Start: 2020-10-13 | End: 2020-10-13

## 2020-10-13 RX ORDER — PANTOPRAZOLE SODIUM 40 MG/10ML
40 INJECTION, POWDER, LYOPHILIZED, FOR SOLUTION INTRAVENOUS 2 TIMES DAILY
Status: DISCONTINUED | OUTPATIENT
Start: 2020-10-13 | End: 2020-10-14

## 2020-10-13 RX ORDER — DEXTROSE MONOHYDRATE 25 G/50ML
25 INJECTION, SOLUTION INTRAVENOUS ONCE
Status: COMPLETED | OUTPATIENT
Start: 2020-10-13 | End: 2020-10-13

## 2020-10-13 RX ORDER — 0.9 % SODIUM CHLORIDE 0.9 %
20 INTRAVENOUS SOLUTION INTRAVENOUS ONCE
Status: DISCONTINUED | OUTPATIENT
Start: 2020-10-13 | End: 2020-10-14

## 2020-10-13 RX ADMIN — PANTOPRAZOLE SODIUM 40 MG: 40 INJECTION, POWDER, FOR SOLUTION INTRAVENOUS at 08:52

## 2020-10-13 RX ADMIN — BUMETANIDE 0.2 MG/HR: 0.25 INJECTION, SOLUTION INTRAMUSCULAR; INTRAVENOUS at 18:01

## 2020-10-13 RX ADMIN — INSULIN LISPRO 1 UNITS: 100 INJECTION, SOLUTION INTRAVENOUS; SUBCUTANEOUS at 12:35

## 2020-10-13 RX ADMIN — CALCIUM GLUCONATE 1 G: 98 INJECTION, SOLUTION INTRAVENOUS at 10:35

## 2020-10-13 RX ADMIN — INSULIN LISPRO 1 UNITS: 100 INJECTION, SOLUTION INTRAVENOUS; SUBCUTANEOUS at 00:08

## 2020-10-13 RX ADMIN — METOPROLOL TARTRATE 2.5 MG: 5 INJECTION INTRAVENOUS at 23:30

## 2020-10-13 RX ADMIN — METOPROLOL TARTRATE 2.5 MG: 5 INJECTION INTRAVENOUS at 12:05

## 2020-10-13 RX ADMIN — SODIUM CHLORIDE: 9 INJECTION, SOLUTION INTRAVENOUS at 00:08

## 2020-10-13 RX ADMIN — DIGOXIN 250 MCG: 0.25 INJECTION INTRAMUSCULAR; INTRAVENOUS at 08:53

## 2020-10-13 RX ADMIN — PIPERACILLIN SODIUM AND TAZOBACTAM SODIUM 3.38 G: 3; .375 INJECTION, POWDER, LYOPHILIZED, FOR SOLUTION INTRAVENOUS at 11:55

## 2020-10-13 RX ADMIN — CHLORHEXIDINE GLUCONATE 0.12% ORAL RINSE 15 ML: 1.2 LIQUID ORAL at 20:19

## 2020-10-13 RX ADMIN — SODIUM CHLORIDE 12.5 ML/HR: 9 INJECTION, SOLUTION INTRAVENOUS at 22:40

## 2020-10-13 RX ADMIN — FENTANYL CITRATE 25 MCG: 50 INJECTION, SOLUTION INTRAMUSCULAR; INTRAVENOUS at 14:25

## 2020-10-13 RX ADMIN — ALBUMIN (HUMAN) 25 G: 0.25 INJECTION, SOLUTION INTRAVENOUS at 00:57

## 2020-10-13 RX ADMIN — DEXTROSE MONOHYDRATE 25 G: 25 INJECTION, SOLUTION INTRAVENOUS at 10:31

## 2020-10-13 RX ADMIN — METOPROLOL TARTRATE 2.5 MG: 5 INJECTION INTRAVENOUS at 18:24

## 2020-10-13 RX ADMIN — PANTOPRAZOLE SODIUM 40 MG: 40 INJECTION, POWDER, FOR SOLUTION INTRAVENOUS at 20:19

## 2020-10-13 RX ADMIN — METOPROLOL TARTRATE 2.5 MG: 5 INJECTION INTRAVENOUS at 00:01

## 2020-10-13 RX ADMIN — ALBUMIN (HUMAN) 25 G: 0.25 INJECTION, SOLUTION INTRAVENOUS at 08:53

## 2020-10-13 RX ADMIN — INSULIN HUMAN 10 UNITS: 100 INJECTION, SOLUTION PARENTERAL at 10:30

## 2020-10-13 RX ADMIN — CHLORHEXIDINE GLUCONATE 0.12% ORAL RINSE 15 ML: 1.2 LIQUID ORAL at 08:42

## 2020-10-13 RX ADMIN — CALCIUM GLUCONATE: 94 INJECTION, SOLUTION INTRAVENOUS at 18:02

## 2020-10-13 RX ADMIN — SODIUM PHOSPHATE, MONOBASIC, MONOHYDRATE AND SODIUM PHOSPHATE, DIBASIC, ANHYDROUS 20 MMOL: 276; 142 INJECTION, SOLUTION INTRAVENOUS at 08:40

## 2020-10-13 RX ADMIN — ALBUMIN (HUMAN) 25 G: 0.25 INJECTION, SOLUTION INTRAVENOUS at 18:01

## 2020-10-13 RX ADMIN — PIPERACILLIN SODIUM AND TAZOBACTAM SODIUM 3.38 G: 3; .375 INJECTION, POWDER, LYOPHILIZED, FOR SOLUTION INTRAVENOUS at 18:36

## 2020-10-13 RX ADMIN — Medication 10 ML: at 08:41

## 2020-10-13 RX ADMIN — FLUCONAZOLE 400 MG: 2 INJECTION, SOLUTION INTRAVENOUS at 11:55

## 2020-10-13 RX ADMIN — SODIUM CHLORIDE: 9 INJECTION, SOLUTION INTRAVENOUS at 06:35

## 2020-10-13 RX ADMIN — PIPERACILLIN SODIUM AND TAZOBACTAM SODIUM 3.38 G: 3; .375 INJECTION, POWDER, LYOPHILIZED, FOR SOLUTION INTRAVENOUS at 03:06

## 2020-10-13 RX ADMIN — Medication 10 ML: at 20:19

## 2020-10-13 ASSESSMENT — PULMONARY FUNCTION TESTS
PIF_VALUE: 18
PIF_VALUE: 15
PIF_VALUE: 20
PIF_VALUE: 14
PIF_VALUE: 21
PIF_VALUE: 14
PIF_VALUE: 16
PIF_VALUE: 14
PIF_VALUE: 16
PIF_VALUE: 16
PIF_VALUE: 15
PIF_VALUE: 13
PIF_VALUE: 14
PIF_VALUE: 15
PIF_VALUE: 14
PIF_VALUE: 14
PIF_VALUE: 12
PIF_VALUE: 14
PIF_VALUE: 21
PIF_VALUE: 19
PIF_VALUE: 14
PIF_VALUE: 14

## 2020-10-13 ASSESSMENT — PAIN SCALES - GENERAL
PAINLEVEL_OUTOF10: 0
PAINLEVEL_OUTOF10: 4
PAINLEVEL_OUTOF10: 0

## 2020-10-13 NOTE — FLOWSHEET NOTE
Inpatient Wound Care    Admit Date: 10/4/2020  4:26 PM    Reason for consult:  Abd vac, ileostomy, buttock    Significant history:  Admitted with septic shock s/p C-diff colitis with total colectomy, re-exploration, closure and loop ileostomy. Wound history:  Surgical    Findings:       10/13/20 1213   Wound 10/06/20 Buttocks Left   Date First Assessed/Time First Assessed: 10/06/20 0800   Primary Wound Type: Pressure Injury  Location: Buttocks  Wound Location Orientation: Left   Wound Etiology Deep tissue/Injury   Wound Assessment Erythema  (purple)   Drainage Amount None   Bernadine-wound Assessment Blanchable erythema   Wound 10/13/20 Abdomen   Date First Assessed/Time First Assessed: 10/13/20 1212   Present on Hospital Admission: No  Location: Abdomen   Wound Image    Wound Etiology Non-Healing Surgical   Dressing Status New dressing applied   Wound Cleansed Cleansed with saline   Dressing/Treatment Negative pressure wound therapy   Wound Length (cm) 21 cm   Wound Width (cm) 6 cm   Wound Depth (cm) 5 cm   Wound Surface Area (cm^2) 126 cm^2   Wound Volume (cm^3) 630 cm^3   Wound Assessment   (dark)   Drainage Amount Small   Drainage Description Serosanguinous   Odor None   Bernadine-wound Assessment Fragile   Negative Pressure Wound Therapy Abdomen Anterior   Placement Date/Time: 10/10/20 0844   Inserted by: DR Denise Gifford  Location: Abdomen  Wound Location Orientation: Anterior   $ Disposable NPWT >50 sq cm PER TX $ Yes   Wound Type Surgical   Unit Type   (KCI)   Dressing Type Black foam   Number of pieces used   (3)   Cycle Continuous   Target Pressure (mmHg) 125   Intensity 4   Dressing Status Changed   Dressing Changed Changed/New   Drainage Amount Small   Drainage Description Serosanguinous   Dressing Change Due 10/16/20   Wound Assessment Red  (dark)   Bernadine-wound Assessment Fragile       Impression:  Open abd incision, loop ileostomy, L buttock DTI. Interventions in place:  Abd dressing soaked off with NSS.  Wound bed red with dark tissue noted. The vac was placed using black granulofoam . Opsite applied and suction maintained at 125 mmHg cont. The ostomy appliance was changed using 1 pc Coloplast with paste. Red isa is intact. Bowel sweat noted in pouch. L buttock     Plan: MWF wound vac changes, ostomy care as needed, SOS precautions, low air loss mattress.        Joselin Toure 10/13/2020 12:19 PM

## 2020-10-13 NOTE — PROGRESS NOTES
Subjective:    Patient is intubated and not currently sedated. No family at bedside. Patient does not respond to commands or pain. Objective:    BP (!) 100/42   Pulse 115   Temp 98.7 °F (37.1 °C)   Resp 18   Ht 5' 6\" (1.676 m)   Wt 238 lb 5.1 oz (108.1 kg)   SpO2 99%   BMI 38.47 kg/m²     General: NAD  HEENT: No thrush or mucositis, EOMI, PERRLA, ETT in place  Heart:  RRR, no murmurs, gallops, or rubs.   Lungs:  diminished , no wheeze, rales or rhonchi  Abd: bowel sounds present, nontender, nondistended, no masses  Extrem:  BLE +3 edema, +2 BUE, no clubbing, cyanosis,  Lymphatics: No palpable adenopathy in cervical and supraclavicular regions  Skin: Intact, no petechia or purpura    CBC with Differential:    Lab Results   Component Value Date    WBC 11.4 10/13/2020    RBC 2.03 10/13/2020    HGB 6.0 10/13/2020    HCT 19.5 10/13/2020    PLT 29 10/13/2020    MCV 96.1 10/13/2020    MCH 29.6 10/13/2020    MCHC 30.8 10/13/2020    RDW 19.6 10/13/2020    NRBC 2.0 10/13/2020    METASPCT 2.0 10/13/2020    LYMPHOPCT 18.0 10/13/2020    PROMYELOPCT 1.0 10/10/2020    MONOPCT 5.0 10/13/2020    MYELOPCT 4.0 10/10/2020    BASOPCT 0.0 10/13/2020    MONOSABS 0.57 10/13/2020    LYMPHSABS 2.05 10/13/2020    EOSABS 0.00 10/13/2020    BASOSABS 0.00 10/13/2020     CMP:    Lab Results   Component Value Date     10/13/2020    K 5.2 10/13/2020    K 3.8 10/09/2020     10/13/2020    CO2 30 10/13/2020    BUN 27 10/13/2020    CREATININE 0.6 10/13/2020    GFRAA >60 10/13/2020    LABGLOM >60 10/13/2020    GLUCOSE 137 10/13/2020    PROT 5.0 10/13/2020    LABALBU 2.9 10/13/2020    CALCIUM 8.0 10/13/2020    BILITOT 1.7 10/13/2020    ALKPHOS 249 10/13/2020    AST 53 10/13/2020    ALT 17 10/13/2020       Current Facility-Administered Medications:     0.9 % sodium chloride bolus, 20 mL, Intravenous, Once, Dipesh Mata MD    pantoprazole (PROTONIX) injection 40 mg, 40 mg, Intravenous, BID, Teresa Chaudhry MD, 40 mg at 10/13/20 0852    sodium phosphate 20 mmol in dextrose 5 % 500 mL IVPB, 20 mmol, Intravenous, Once, Pasha Angel MD, Last Rate: 100 mL/hr at 10/13/20 0840, 20 mmol at 10/13/20 0840    PN-Adult  3-in-1 Central Line (Standard), , Intravenous, Continuous TPN, Shruthi Sahni MD    bumetanide (BUMEX) 12.5 mg in sodium chloride 0.9 % 125 mL infusion, 0.2 mg/hr, Intravenous, Continuous, Shruthi Sahni MD, Last Rate: 2 mL/hr at 10/12/20 1057, 0.2 mg/hr at 10/12/20 1057    albumin human 25 % IV solution 25 g, 25 g, Intravenous, Q8H, Shruthi Sahni MD, Stopped at 10/13/20 1156    PN-Adult  3-in-1 Central Line (Standard), , Intravenous, Continuous TPN, Pasha Angel MD, Last Rate: 66.7 mL/hr at 10/12/20 1749    fluconazole (DIFLUCAN) in 0.9 % sodium chloride IVPB 400 mg, 400 mg, Intravenous, Q24H, Franc Sarkar MD, Last Rate: 100 mL/hr at 10/13/20 1155, 400 mg at 10/13/20 1155    metoprolol (LOPRESSOR) injection 2.5 mg, 2.5 mg, Intravenous, Q6H, Yajaira Mederos MD, 2.5 mg at 10/13/20 1205    fentaNYL (SUBLIMAZE) injection 25 mcg, 25 mcg, Intravenous, Q4H PRN, Kenrick Dowd MD, 25 mcg at 10/12/20 1317    insulin lispro (HUMALOG) injection vial 0-6 Units, 0-6 Units, Subcutaneous, Q6H, Dione Gabriela, DO, 1 Units at 10/13/20 1235    sodium chloride flush 0.9 % injection 10 mL, 10 mL, Intravenous, BID, Shruthi Sahni MD, 10 mL at 10/13/20 0841    piperacillin-tazobactam (ZOSYN) 3.375 g in dextrose 5 % 50 mL IVPB extended infusion (mini-bag), 3.375 g, Intravenous, Q8H, Franc Sarkar MD, Last Rate: 12.5 mL/hr at 10/13/20 1155, 3.375 g at 10/13/20 1155    0.9 % sodium chloride infusion, , Intravenous, Q8H, Franc Sarkar MD, Last Rate: 12.5 mL/hr at 10/13/20 0635    [DISCONTINUED] lubrifresh P.M. (artificial tears) ophthalmic ointment 0.5 inch, 1 applicator, Both Eyes, PRN **AND** lubrifresh P.M. (artificial tears) ophthalmic ointment, , Both Eyes, PRN, MD Erum Gardner chlorhexidine (PERIDEX) 0.12 % solution 15 mL, 15 mL, Mouth/Throat, BID, Unknown MD Mike, 15 mL at 10/13/20 7729    Assessment:    Active Problems:    Septic shock (HCC)    Hyponatremia    Diarrhea    UTI (urinary tract infection) with pyuria    Anemia    Leukocytosis    Acidosis    Hypotension    Sick sinus syndrome (HCC)    Cardiac pacemaker in situ    C. difficile colitis    QI (acute kidney injury) ([address]    Thrombocytopenia ([address]    Anemia associated with acute blood loss  Resolved Problems:    * No resolved hospital problems. *    An 79-year-old woman with toxic megacolon from C. difficile colitis, requiring total colectomy and abdominal wound dressing. Peritoneal fluid positive with E. coli and Lauren albicans. She had normal platelet count on admission, severe leukemoid reaction and septic shock. DVT was found in the left mid to proximal brachial vein in the L upper extremity. She did have good response to the platelets that were transfused prior to her second surgery on 10/10/2020. We were subsequently consulted for thrombocytopenia and r/o HIT. 10/13- Repeat CT was negative. Plan:  Monitor CBC daily   Once Platelet ct is >332,312 can be switched over to Coumadin anticoagulation.    Continue antibiotics per ID  S/p argatroban protocol  TPN  HIT antibody- pending  No signs of hemolysis   Full supportive care       Electronically signed by ORIN Rader NP on 10/13/2020 at 1:07 PM

## 2020-10-13 NOTE — PROGRESS NOTES
mg by mouth every 6 hours as needed   Yes Historical Provider, MD   calcium carbonate 600 MG TABS tablet Take 1 tablet by mouth daily   Yes Historical Provider, MD   ferrous sulfate (IRON 325) 325 (65 Fe) MG tablet Take 325 mg by mouth daily (with breakfast)   Yes Historical Provider, MD   lisinopril (PRINIVIL;ZESTRIL) 40 MG tablet Take 40 mg by mouth daily   Yes Historical Provider, MD   melatonin 3 MG TABS tablet Take 6 mg by mouth daily   Yes Historical Provider, MD   tamsulosin (FLOMAX) 0.4 MG capsule Take 0.4 mg by mouth nightly    Historical Provider, MD   loperamide (IMODIUM) 2 MG capsule Take 2 mg by mouth every 12 hours as needed for Diarrhea    Historical Provider, MD   mesalamine (PENTASA) 500 MG extended release capsule Take 500 mg by mouth 4 times daily    Historical Provider, MD   cefTRIAXone (ROCEPHIN) 1 g injection Infuse 1 g intravenously every 24 hours    Historical Provider, MD   sotalol (BETAPACE) 120 MG tablet Take 120 mg by mouth 2 times daily    Historical Provider, MD   ondansetron (ZOFRAN) 4 MG tablet Take 4 mg by mouth every 6 hours as needed for Nausea or Vomiting    Historical Provider, MD       Current Medications:  Current Facility-Administered Medications   Medication Dose Route Frequency Provider Last Rate Last Dose    0.9 % sodium chloride bolus  20 mL Intravenous Once Dipesh Mata MD        pantoprazole (PROTONIX) injection 40 mg  40 mg Intravenous BID Teresa Chaudhry MD        sodium phosphate 20 mmol in dextrose 5 % 500 mL IVPB  20 mmol Intravenous Once Dipesh Mata MD        bumetanide (BUMEX) 12.5 mg in sodium chloride 0.9 % 125 mL infusion  0.2 mg/hr Intravenous Continuous Brandee Kim MD 2 mL/hr at 10/12/20 1057 0.2 mg/hr at 10/12/20 1057    albumin human 25 % IV solution 25 g  25 g Intravenous Q8H Brandee Kim MD   Stopped at 10/13/20 0306    PN-Adult  3-in-1 Central Line (Standard)   Intravenous Continuous TPN Dipesh Mata MD 66.7 mL/hr at 10/12/20 1749      argatroban infusion 50mg in 0.9% sodium chloride 50 mL (premix)  0.25 mcg/kg/min Intravenous Continuous Neymar Rodríguez MD   Stopped at 10/12/20 2213    fluconazole (DIFLUCAN) in 0.9 % sodium chloride IVPB 400 mg  400 mg Intravenous Q24H Marvetta Severs,  mL/hr at 10/12/20 1120 400 mg at 10/12/20 1120    metoprolol (LOPRESSOR) injection 2.5 mg  2.5 mg Intravenous Q6H Angelo Mera MD   2.5 mg at 10/13/20 0001    fentaNYL (SUBLIMAZE) injection 25 mcg  25 mcg Intravenous Q4H PRN Lewis Meza MD   25 mcg at 10/12/20 1317    insulin lispro (HUMALOG) injection vial 0-6 Units  0-6 Units Subcutaneous Q6H Gerline High, DO   1 Units at 10/13/20 0008    sodium chloride flush 0.9 % injection 10 mL  10 mL Intravenous BID Neymar Rodríguez MD   10 mL at 10/12/20 2144    piperacillin-tazobactam (ZOSYN) 3.375 g in dextrose 5 % 50 mL IVPB extended infusion (mini-bag)  3.375 g Intravenous Q8H Marvetta Severs, MD   Stopped at 10/13/20 0634    0.9 % sodium chloride infusion   Intravenous Q8H Marvetta Severs, MD 12.5 mL/hr at 10/13/20 7014      norepinephrine (LEVOPHED) 16 mg in dextrose 5 % 250 mL infusion  2 mcg/min Intravenous Continuous Marisa Oconnor MD   Stopped at 10/09/20 1226    lubrifresh P.M. (artificial tears) ophthalmic ointment   Both Eyes PRN Deb Amaral MD        chlorhexidine (PERIDEX) 0.12 % solution 15 mL  15 mL Mouth/Throat BID Deb Amaral MD   15 mL at 10/12/20 2143      bumetanide 0.1 mg/mL infusion 0.2 mg/hr (10/12/20 1057)    PN-Adult  3 IN 1 Central Line (Standard) 66.7 mL/hr at 10/12/20 1749    argatroban infusion Stopped (10/12/20 2213)    sodium chloride 12.5 mL/hr at 10/13/20 0635    norepinephrine Stopped (10/09/20 1226)       Physical Exam:  /61   Pulse 120   Temp 98.2 °F (36.8 °C) (Axillary)   Resp 18   Ht 5' 6\" (1.676 m)   Wt 238 lb 5.1 oz (108.1 kg)   SpO2 99%   BMI 38.47 kg/m²   Weight change:    Wt Readings from Last 3 Encounters:   10/12/20 238 lb 5.1 oz (108.1 kg)     General: Appears comfortable on current ventilator settings/sedation  HEENT: Normocephalic and atraumatic, pupils equal and round  Neck: No JVD, no carotid bruits,   Cardiac: Regular rate and rhythm. Normal S1 and physiologically split S2, no S3, no S4. Apical impulse is nondisplaced. No murmurs, no pericardial rubs, no clicks. Carotid upstrokes brisk. Respiratory: Clear anteriorly no wheezes, no rales, no rhonchi. On vent  Abdomen: s/p colectomy  Extremities: No edema, no cyanosis, no clubbing. Distal pulses intact  Skin: Intact, warm and dry, no rashes, no breakdown  Musculoskeletal: normal tone and strength in the upper and lower extremities bilaterally  Neuro:not responsive on propofol at present    Intake/Output:    Intake/Output Summary (Last 24 hours) at 10/13/2020 0703  Last data filed at 10/13/2020 0622  Gross per 24 hour   Intake 3747 ml   Output 4492.5 ml   Net -745.5 ml     No intake/output data recorded.     Laboratory Tests:  Last 3 CBC:  Recent Labs     10/11/20  0600 10/11/20  2220 10/12/20  0545 10/13/20  0515   WBC 23.6*  --  17.7* 11.4   RBC 2.65*  --  2.56* 2.03*   HGB 7.8* 7.4* 7.6* 6.0*   HCT 24.6* 23.3* 24.0* 19.5*   MCV 92.8  --  93.8 96.1   MCH 29.4  --  29.7 29.6   MCHC 31.7*  --  31.7* 30.8*   RDW 19.9*  --  19.9* 19.6*   PLT 44*  --  29* 29*   MPV 10.9  --  10.9 10.9       Last 3 CMP:    Recent Labs     10/11/20  0600 10/12/20  0545 10/12/20  1115 10/13/20  0515    144  --  140   K 5.0 5.7* 5.3* 5.2*   * 113*  --  107   CO2 27 28  --  30*   BUN 28* 29*  --  27*   CREATININE 0.7 0.5  --  0.6   GLUCOSE 146* 131*  --  137*   CALCIUM 7.4* 7.5*  --  8.0*   PROT 4.1* 4.2*  --  5.0*   LABALBU 2.0* 1.8*  --  2.9*   BILITOT 1.1 1.0  --  1.7*   ALKPHOS 224* 280*  --  249*   AST 43* 49*  --  53*   ALT 16 17  --  17       Last 3 Mag/Phos:  Recent Labs     10/11/20  0600 10/12/20  0545 10/13/20  0515   MG 2.0 2.1 2.2   PHOS 1.3* 1. 5* 2.2*       Last 3 CK, CKMB, Troponin  No results for input(s): CKTOTAL, CKMB, TROPONINI in the last 72 hours. Last 3 Pro-BNP:  No results for input(s): PROBNP in the last 72 hours. No results found for: PROBNP    Last 3 Glucose:     Recent Labs     10/11/20  0600 10/12/20  0545 10/13/20  0515   GLUCOSE 146* 131* 137*       Last 3 Coags:  Recent Labs     10/11/20  0600 10/12/20  0545 10/13/20  0515   PROTIME 12.5* 49.3* 38.8*   INR 1.1 4.0 3.2     Lab Results   Component Value Date    PROTIME 38.8 10/13/2020    INR 3.2 10/13/2020       Last 3 Lipid Panel:  No results for input(s): LDLCALC, HDL, TRIG in the last 72 hours. Invalid input(s): CHLPL  No results found for: LDLCALC  No results found for: HDL  Lab Results   Component Value Date    TRIG 123 10/10/2020       ABGs:  Recent Labs     10/13/20  0550   PH 7.464*   PO2 146.3*   PCO2 38.1   HCO3 26.7*   BE 2.8   O2SAT 99.1*       Lactic Acid:  Recent Labs     10/10/20  1900   LACTA 1.7         Radiology:  RAD Results:  XR CHEST PORTABLE   Final Result   Unchanged airspace disease and pleural effusions. XR CHEST PORTABLE   Final Result   1. Left internal jugular central venous catheter tip is not well seen but   appears to terminate in the lower superior vena cava. No complication   including pneumothorax. 2. Other lines and tubes are stable. 3. Stable cardiopulmonary status. XR CHEST PORTABLE   Final Result   1. Moderate bilateral pleural effusions. 2. Findings of mild vascular congestion. CT HEAD WO CONTRAST   Final Result   Focal hypodensity within the left frontal lobe white matter. Correlate MRI   imaging as subacute infarct cannot be excluded. XR CHEST PORTABLE   Final Result   Supportive devices are in stable positions. Bibasilar effusions and airspace disease/atelectasis.   Improved aeration of   the right base in the interval.         XR CHEST PORTABLE   Final Result   Increased right lower lobe infiltrate versus atelectasis and small pleural   effusion on the right. Unchanged left basilar airspace disease and small pleural effusion. US DUP LOWER EXTREMITIES BILATERAL VENOUS   Final Result   No evidence of DVT in either lower extremity. US DUP UPPER EXTREMITY LEFT VENOUS   Final Result   Occlusive thrombus left mid to proximal brachial vein. The findings were sent to the Radiology Results [address] at 11:31   am on 10/9/2020to be communicated to a licensed caregiver. XR CHEST PORTABLE   Final Result   ET tube advancement. Right lower lobe atelectasis. XR CHEST PORTABLE   Final Result   No interval change         RADIOLOGY REPORT   Final Result      XR CHEST PORTABLE   Final Result   1. No interval change in the small left pleural effusion and left lung base   atelectasis. 2. No change in the minimal right lung base atelectasis. XR CHEST PORTABLE   Final Result   Opacities are present in left lung base which appear to have increased in   could suggest increasing pneumonia, atelectasis, or effusion. XR CHEST ABDOMEN NG PLACEMENT   Final Result   Satisfactory position of nasogastric tube. XR ABDOMEN (KUB) (SINGLE AP VIEW)   Final Result   Transverse, descending, and sigmoid colon mural thickening may reflect edema   and/or inflammation/infection, without significant interval change. Slight prominence of gas throughout the small intestine, without gross   distension, suggestive of paralytic ileus. Persisting left lung base opacity may be atelectasis with pleural effusion. Differential again includes infectious infiltrate         XR ABDOMEN (KUB) (SINGLE AP VIEW)   Final Result   Thickening of the transverse colon wall. Consistent with inflammation   identified on prior CT.          US GALLBLADDER RUQ   Final Result   Cholelithiasis with positive sonographic Hopson's sign but no wall thickening   or pericholecystic inflammatory changes, altogether equivocal for   cholecystitis. Consider functional analysis with nuclear medicine HIDA scan. Hepatic steatosis. 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 Suspected small right pleural effusion. CT ABDOMEN PELVIS W IV CONTRAST Additional Contrast? None   Final Result   Addendum 1 of 1   ADDENDUM:   In the title of the examination, disregard the CT abdomen and pelvis. Final      CT ABDOMEN PELVIS W IV CONTRAST Additional Contrast? None   Final Result   Diffuse inflammation of the transverse colon, descending and rectosigmoid   colon with wall thickening and edema with active contrast   extravasation/bleeding. Hemorrhagic diverticulitis or colitis are   considered. There is some edema in the presacral area and tiny amount of air   collection in the rectovesical pouch. Walled-off microperforation is   considered. Distended gallbladder. Atelectasis/pleural effusion in the left lung base. RECOMMENDATIONS:   The clinical service was notified         XR CHEST PORTABLE   Final Result   Addendum 1 of 1   ADDENDUM:   In the title of the examination, disregard the CT abdomen and pelvis. Final      XR CHEST 1 VIEW   Final Result   Suspect left pleural effusion. No airspace consolidation. Dual-chamber   pacemaker in place. Follow-up PA and lateral radiographs may be helpful in   further evaluation. XR CHEST PORTABLE    (Results Pending)   CT HEAD WO CONTRAST    (Results Pending)                 ASSESSMENT / PLAN:    1. Atrial fibrillation after coming off sotalol and significant stressors. Has spontaneously converted to sinus rhythm, and blood pressure stable. PRN  dig stopped, and now on metoprolol 2.5 mg IV every 6 hours with BP hold parameters. Restart sotalol once able to take p.o./NG    2 PPM-functioning normally    3 Colectomy/septic shock     4 Thrombocytopenia and anemia: Worsening    5 ID-C. diff, toxic

## 2020-10-13 NOTE — PATIENT CARE CONFERENCE
Intensive Care Daily Quality Rounding Checklist        ICU Team Members: Dr. Adrianna Bennett, Jasen Camargo (residents), charge nurse, bedside nurse, clinical pharmacist     ICU Day #: 6     Intubation Date: 10/7/20     Ventilator Day #: 7     Central Line Insertion Date: 10/12/20                                                    UTF #: 2      Arterial Line Insertion Date: 10/7/20                             Day #: 7     DVT Prophylaxis: Argatroban, will stop and just use PCDs    GI Prophylaxis: Protonix     Martin Catheter Insertion Date: 10/4/20                                        Day #: 10                             Continued need (if yes, reason documented and discussed with physician): yes; Strict I's & O's        Skin Issues/ Wounds and ordered treatment discussed on rounds: yes, has surgical wounds, SOS precautions     Goals/ Plans for the Day: Daily labs, wean vent as able, CT head today, continue TPN (adjust electrolytes), stop Argatroban

## 2020-10-13 NOTE — PROGRESS NOTES
Department of Internal Medicine  Nephrology Attending Progress Note      Reason for Consult: QI  Requesting Physician:  Dr Awilda Rondon:  weakness    History Obtained From:  patient, family member - daughter    Sub: Patient seen and examined at bedside in the ICU  Events over the last 24 hours reviewed  Tolerating TPN          Past Medical History:        Diagnosis Date    Anemia     Atrial fibrillation ([address]     Colitis     Hypertension        Current Medications:    Current Facility-Administered Medications: 0.9 % sodium chloride bolus, 20 mL, Intravenous, Once  pantoprazole (PROTONIX) injection 40 mg, 40 mg, Intravenous, BID  PN-Adult  3-in-1 Central Line (Standard), , Intravenous, Continuous TPN  bumetanide (BUMEX) 12.5 mg in sodium chloride 0.9 % 125 mL infusion, 0.2 mg/hr, Intravenous, Continuous  albumin human 25 % IV solution 25 g, 25 g, Intravenous, Q8H  fluconazole (DIFLUCAN) in 0.9 % sodium chloride IVPB 400 mg, 400 mg, Intravenous, Q24H  metoprolol (LOPRESSOR) injection 2.5 mg, 2.5 mg, Intravenous, Q6H  fentaNYL (SUBLIMAZE) injection 25 mcg, 25 mcg, Intravenous, Q4H PRN  insulin lispro (HUMALOG) injection vial 0-6 Units, 0-6 Units, Subcutaneous, Q6H  sodium chloride flush 0.9 % injection 10 mL, 10 mL, Intravenous, BID  piperacillin-tazobactam (ZOSYN) 3.375 g in dextrose 5 % 50 mL IVPB extended infusion (mini-bag), 3.375 g, Intravenous, Q8H  0.9 % sodium chloride infusion, , Intravenous, Q8H  [DISCONTINUED] lubrifresh P.M. (artificial tears) ophthalmic ointment 0.5 inch, 1 applicator, Both Eyes, PRN **AND** lubrifresh P.M. (artificial tears) ophthalmic ointment, , Both Eyes, PRN  chlorhexidine (PERIDEX) 0.12 % solution 15 mL, 15 mL, Mouth/Throat, BID  Allergies:  Codeine    Social History:    TOBACCO:  Never used tobacco  ETOH:  Never drank alcohol  DRUGS:  Never used recreational drugs    Family History:   History reviewed. No pertinent family history.     PHYSICAL EXAM:      Vitals: VITALS:  BP (!) 100/42   Pulse 118   Temp 98.4 °F (36.9 °C) (Axillary)   Resp 20   Ht 5' 6\" (1.676 m)   Wt 238 lb 5.1 oz (108.1 kg)   SpO2 99%   BMI 38.47 kg/m²   24HR BLOOD PRESSURE RANGE:  Systolic (21LCG), SWU:481 , Min:100 , HYN:294   ; Diastolic (98KVN), XFZ:73, Min:42, Max:45    24HR INTAKE/OUTPUT:      Intake/Output Summary (Last 24 hours) at 10/13/2020 1826  Last data filed at 10/13/2020 1800  Gross per 24 hour   Intake 3359 ml   Output 4990 ml   Net -1631 ml       Constitutional:  Well developed and nourished , no acute distress, intubated and sedated, seen in the ICU, Fio2 at 40 %  HEENT:  NC, PERRL, oral mucosa dry , neck supple, no JVD  Respiratory:  Clear bilaterally except decreased BS at bases  Cardiovascular/Edema: RRR, s1,s2 no gallop  Gastrointestinal:  Wound vac in place, caraballo in place draining concentrated urine  Neurologic:  Alert and Ox 3 , nonfocal  Skin:  Decreased turgor  Other:  No rash    DATA:    CBC:   Lab Results   Component Value Date    WBC 11.4 10/13/2020    RBC 2.03 10/13/2020    HGB 7.3 10/13/2020    HCT 23.4 10/13/2020    MCV 96.1 10/13/2020    MCH 29.6 10/13/2020    MCHC 30.8 10/13/2020    RDW 19.6 10/13/2020    PLT 29 10/13/2020    MPV 10.9 10/13/2020     CMP:    Lab Results   Component Value Date     10/13/2020    K 5.2 10/13/2020    K 3.8 10/09/2020     10/13/2020    CO2 30 10/13/2020    BUN 27 10/13/2020    CREATININE 0.6 10/13/2020    GFRAA >60 10/13/2020    LABGLOM >60 10/13/2020    GLUCOSE 137 10/13/2020    PROT 5.0 10/13/2020    LABALBU 2.9 10/13/2020    CALCIUM 8.0 10/13/2020    BILITOT 1.7 10/13/2020    ALKPHOS 249 10/13/2020    AST 53 10/13/2020    ALT 17 10/13/2020     Magnesium:    Lab Results   Component Value Date    MG 2.2 10/13/2020     Phosphorus:    Lab Results   Component Value Date    PHOS 2.2 10/13/2020     Uric Acid:  No results found for: LABURIC, URICACID  U/A:    Lab Results   Component Value Date    COLORU CASEY 10/04/2020 PROTEINU TRACE 10/04/2020    PHUR 5.0 10/04/2020    WBCUA >20 10/04/2020    RBCUA NONE 10/04/2020    BACTERIA FEW 10/04/2020    CLARITYU TURBID 10/04/2020    SPECGRAV 1.025 10/04/2020    LEUKOCYTESUR MODERATE 10/04/2020    UROBILINOGEN 0.2 10/04/2020    BILIRUBINUR MODERATE 10/04/2020    BLOODU Negative 10/04/2020    GLUCOSEU Negative 10/04/2020     Radiology Review:    Atelectasis and pleural effusion in the lung bases more on the left side.         Right IJ central line has noted without complications.           Problems resolved:       IMPRESSION/RECOMMENDATIONS:      Emiliano Lucio is a 82 y.o. female with significant past medical history of Colitis, diarrhea, blood in stool, A-Fib, anemia, and HTN admitted via ED for hypotension and diarrhea. Pt with SBP in 80's in ED, she was treated with IV fluids. Pt has been having diarrhea for > 3 months stating stools are dark with red blood associated nausea, vomiting, and poor oral intake. Daughter reports that patient was discharged from [facility] and was living with her. Patient got progressively worse therefore she brought her to ED. Initial labs done showed serum sodium 126 and creatinine of 1.3 mg/dl yesterday which worsened to 122 and 1.6 mg/dl respectively therefore this consultation was ordered. 1. QI secondary to volume responsive prerenal state versus ischemic ATN    2. Hyponatremia probably secondary to GI losses  3. C. Difficile colitis, with evidence of fulminant colitis with multiple perforations, s/p total colectomy  4. High anion gap metabolic acidosis secondary to lactic acidosis with severe hypotension, hypoperfusion, PH>7.2  5. Anemia : on Ferrous sulfate,   6. Severe hypoalbuminemia/malnutrition  7. Hypocalcemia: Corrected calcium for hypoalbuminemia is 7.7mg/dL  8.  Nutrition:NPO      PLAN:    · QI, oliguric secondary to severe ischemic ATN from profound hypotension, UO is adequate on IV Bumex drip at 0.2 mg/hr  · C/W TPN, rate decreased with electrolyte issues  · Sodium phos 30 mmol IV  · 2 gm of calcium gluconate IV  · Monitor K levels closely  · Antibiotics per ID recommendation     Discussed with Dr. Nivia Workman    Discussed with her son present at bedside      Severino Altman MD

## 2020-10-13 NOTE — PROGRESS NOTES
Critical Care Team - Daily Progress Note      Date and time: 10/13/2020 5:57 AM  Patient's name:  Nathalie Smith  Medical Record Number: [id number]  Patient's account/billing number: [de-identified]  Patient's YOB: 1935  Age: 82 y.o. Date of Admission: 10/4/2020  4:26 PM  Length of stay during current admission: 9      Primary Care Physician: Liam Leavitt MD  ICU Attending Physician: Dr. Mundo Lopez    Code Status: Limited    Reason for ICU admission:   Septic shock due to c.diff colitis  Post-operative respiratory failure requiring mechanical ventilation      SUBJECTIVE:   The patient is a 82 y. o. female with significant past medical history of A-fib, HTN, and anemia that presented to the ER due to hypotension and diarrhea. Patient was recently diagnosed with UTI and was placed on antibiotics. Patient has been having diarrhea for a few weeks. Patient has been having diffuse cramping abdominal pain with no radiation. Patient was found to be guaiac positive in the ER. Patient was transferred out of the ICU on 10/06. On 10/07, patient had an increase in abdominal pain and peritonitis, so she was brought to the OR. Patient underwent a total colectomy with abthera open abdominal dressing. Patient was re-admitted to the ICU due to need for mechanical ventilation.     OVERNIGHT EVENTS:        10/13/2020  No acute events overnight. No stool in colostomy. NG tube black in color. No Argatroban overnight.      AWAKE & FOLLOWING COMMANDS:  [x] No   [] Yes    CURRENT VENTILATION STATUS:     [x] Ventilator  [] BIPAP  [] Nasal Cannula [] Room Air      IF INTUBATED, ET TUBE MARKING AT LOWER LIP:       cms    SECRETIONS Amount:  [] Small [] Moderate  [] Large  [x] None  Color:     [] White [] Colored  [] Bloody    SEDATION:  RAAS Score: -4  [] Propofol gtt  [] Versed gtt  [] Ativan gtt   [] No Sedation    PARALYZED:  [x] No    [] Yes    DIARRHEA:                [x] No                [] Yes  (C. Difficile status: [] positive                                                                                                                       [] negative                                                                                                                     [] pending)    VASOPRESSORS:  [x] No    [] Yes    If yes -   [] Levophed       [] Dopamine     [] Vasopressin       [] Dobutamine  [] Phenylephrine         [] Epinephrine    CENTRAL LINES:     [] No   [x] Yes   (Date of Insertion: 10/04)           If yes -     [x] Right IJ     [] Left IJ [] Right Femoral [] Left Femoral                   [] Right Subclavian [] Left Subclavian       WEST'S CATHETER:   [] No   [x] Yes  (Date of Insertion:10/04)     URINE OUTPUT:            [] Good   [x] Low              [] Anuric      OBJECTIVE:     VITAL SIGNS:  /61   Pulse 115   Temp 98.2 °F (36.8 °C) (Axillary)   Resp 15   Ht 5' 6\" (1.676 m)   Wt 238 lb 5.1 oz (108.1 kg)   SpO2 99%   BMI 38.47 kg/m²   Tmax over 24 hours:  Temp (24hrs), Av.1 °F (36.7 °C), Min:96.2 °F (35.7 °C), Max:98.7 °F (37.1 °C)      Patient Vitals for the past 6 hrs:   Temp Temp src Pulse Resp SpO2   10/13/20 0500 -- -- 115 15 99 %   10/13/20 0400 98.2 °F (36.8 °C) Axillary 117 18 99 %   10/13/20 0300 -- -- 112 14 98 %   10/13/20 0215 -- -- 115 -- --   10/13/20 0200 -- -- 113 14 98 %   10/13/20 0100 -- -- 113 15 99 %   10/13/20 0004 -- -- 120 17 98 %   10/13/20 0000 98.7 °F (37.1 °C) Axillary 119 15 98 %         Intake/Output Summary (Last 24 hours) at 10/13/2020 0557  Last data filed at 10/12/2020 2330  Gross per 24 hour   Intake 4006 ml   Output 2812.5 ml   Net 1193.5 ml     Wt Readings from Last 2 Encounters:   10/12/20 238 lb 5.1 oz (108.1 kg)     Body mass index is 38.47 kg/m².         PHYSICAL EXAMINATION:  /61   Pulse 115   Temp 98.2 °F (36.8 °C) (Axillary)   Resp 15   Ht 5' 6\" (1.676 m)   Wt 238 lb 5.1 oz (108.1 kg)   SpO2 99%   BMI 38.47 kg/m²   Physical Exam  Vitals signs reviewed. Constitutional:       Appearance: She is ill-appearing. Comments: Intubated; grimaces to pain; non-purposeful blinking; RASS -3   HENT:      Mouth/Throat:      Mouth: Mucous membranes are dry. Eyes:      Comments: Pinpoint pupils, sluggish, right sided gaze   Cardiovascular:      Rate and Rhythm: Regular rhythm. Tachycardia present. Heart sounds: No murmur. Pulmonary:      Effort: Pulmonary effort is normal.      Breath sounds: No wheezing or rhonchi (diffuse). Abdominal:      Palpations: Abdomen is soft. Comments: Wound vac in place, ostomy draining brown fluid, incisions CDI; JULISSA drains draining serosanguinous fluid   Musculoskeletal:      Right lower leg: 3+ Pitting Edema present. Left lower leg: 3+ Pitting Edema present. Skin:     General: Skin is warm. Coloration: Skin is pale.       Comments: Improvement in bluing of fingers on left hand   Neurological:      Comments: Intubated; minimal response to pain         Any additional physical findings:    MEDICATIONS:    Scheduled Meds:   albumin human  25 g Intravenous Q8H    fluconazole  400 mg Intravenous Q24H    pantoprazole  40 mg Intravenous Daily    metoprolol  2.5 mg Intravenous Q6H    insulin lispro  0-6 Units Subcutaneous Q6H    sodium chloride flush  10 mL Intravenous BID    piperacillin-tazobactam  3.375 g Intravenous Q8H    chlorhexidine  15 mL Mouth/Throat BID     Continuous Infusions:   bumetanide 0.1 mg/mL infusion 0.2 mg/hr (10/12/20 1057)    PN-Adult  3 IN 1 Central Line (Standard) 66.7 mL/hr at 10/12/20 1749    argatroban infusion Stopped (10/12/20 2213)    sodium chloride 12.5 mL/hr at 10/13/20 0008    norepinephrine Stopped (10/09/20 1226)     PRN Meds:   fentanNYL, 25 mcg, Q4H PRN  artificial tears, , PRN          VENT SETTINGS (Comprehensive) (if applicable):  Vent Information  $Ventilation: $Subsequent Day  Equipment ID: [id number]  Equipment Changed: Suction catheter  Vent Type: [phone]  Vent provided for review. Dose modulation, iterative reconstruction, and/or weight based adjustment of the mA/kV was utilized to reduce the radiation dose to as low as reasonably achievable. COMPARISON: None. HISTORY: ORDERING SYSTEM PROVIDED HISTORY: abd pain, hypotension, gi bleed TECHNOLOGIST PROVIDED HISTORY: Reason for exam:->abd pain, hypotension, gi bleed Additional Contrast?->None FINDINGS: The lung bases demonstrate cardiomegaly with a tiny pericardial effusion. There is minimal atelectasis and pleural effusion in the lung bases left more than right. The liver is fatty infiltrated. The gallbladder is distended without acute inflammation. Consider ultrasonography for better assessment of gallbladder. Spleen, pancreas, the adrenals and the kidneys are normal except for a 1.5 cm cystic lesion in the right kidney. There is degenerative changes in the lumbar spine. .  The urinary bladder is contracted with a Martin catheter and wall thickening. There is diffuse thickening and inflammation of the transverse colon, descending and rectosigmoid colon with hyperdensity concerning for active bleeding/contrast extravasation. There is inflammatory changes in the presacral area. A tiny air bubble is identified in the rectovesical pouch which could represent waled off microperforation. Appendix cannot be identified. There is some inflammatory changes in the gluteus region     Diffuse inflammation of the transverse colon, descending and rectosigmoid colon with wall thickening and edema with active contrast extravasation/bleeding. Hemorrhagic diverticulitis or colitis are considered. There is some edema in the presacral area and tiny amount of air collection in the rectovesical pouch. Walled-off microperforation is considered. Distended gallbladder. Atelectasis/pleural effusion in the left lung base.  RECOMMENDATIONS: The clinical service was notified     Ct Abdomen Pelvis W Iv Contrast Additional Contrast? None    Addendum Date: 10/4/2020    ADDENDUM: In the title of the examination, disregard the CT abdomen and pelvis. Result Date: 10/4/2020  EXAMINATION: ONE XRAY VIEW OF THE CHEST; CT OF THE ABDOMEN AND PELVIS WITH CONTRAST 10/4/2020 7:06 pm COMPARISON: October 4, 2020 HISTORY: ORDERING SYSTEM PROVIDED HISTORY: post R IJ CVC placement TECHNOLOGIST PROVIDED HISTORY: Reason for exam:->post R IJ CVC placement     There is a borderline cardiac size. There is atelectasis in the lung bases with a small left pleural effusion. Left subclavian pacemaker is unchanged. Right IJ central line is noted with the tip at the atrial caval junction. There is no pneumothorax. RECOMMENDATION: Atelectasis and pleural effusion in the lung bases more on the left side. Right IJ central line has noted without complications. Us Gallbladder Ruq    Result Date: 10/6/2020  EXAMINATION: RIGHT UPPER QUADRANT ULTRASOUND 10/5/2020 10:46 pm COMPARISON: CT abdomen pelvis 10/04/2020 HISTORY: ORDERING SYSTEM PROVIDED HISTORY: Elevated alk phos, LUQ pain TECHNOLOGIST PROVIDED HISTORY: Reason for exam:->Elevated alk phos, LUQ pain What reading provider will be dictating this exam?->CRC FINDINGS: LIVER:  Normal liver size and contour. Increased parenchymal echogenicity. No focal lesion. BILIARY SYSTEM:  No intra or extrahepatic bile duct dilatation. Common bile duct measures 5 mm. Gallbladder contains shadowing dependent gallstones. No wall thickening or pericholecystic fluid. Reported positive sonographic Hopson's sign. RIGHT KIDNEY: Right kidney measures 10.1 x 4.5 x 5.2 cm. Anechoic cyst at the superior pole measures up to 15 mm. An echogenic mid polar region focus measures 9 x 9 x 9 mm and correlates with hypodensity on CT, probably a tiny angiomyolipoma. PANCREAS:  Visualized portions of the pancreas are unremarkable. OTHER: Right pleural effusions suspected. No ascites.      Cholelithiasis with positive sonographic Hopson's sign but no wall thickening or pericholecystic inflammatory changes, altogether equivocal for cholecystitis. Consider functional analysis with nuclear medicine HIDA scan. Hepatic steatosis. 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 Suspected small right pleural effusion. Xr Chest Portable    Result Date: 10/11/2020  EXAMINATION: ONE XRAY VIEW OF THE CHEST 10/11/2020 6:31 am COMPARISON: Yesterday HISTORY: ORDERING SYSTEM PROVIDED HISTORY: resp failure TECHNOLOGIST PROVIDED HISTORY: Reason for exam:->resp failure FINDINGS: Patient is rotated. Left infraclavicular cardiac pacemaker with 2 lead wires is noted. The endotracheal tube terminates 5 cm cephalad to the bernadette. Nasogastric tube extends below the hemidiaphragm. Right jugular central line terminates over the distal aspect of the superior vena cava. Cardiac silhouette is normal.  There hazy opacities in lungs. Aeration of right base is improved versus more dependently layered effusion. There is no pneumothorax. Supportive devices are in stable positions. Bibasilar effusions and airspace disease/atelectasis. Improved aeration of the right base in the interval.     Xr Chest Portable    Result Date: 10/10/2020  EXAMINATION: ONE XRAY VIEW OF THE CHEST 10/10/2020 7:01 am COMPARISON: 10/09/2020 HISTORY: ORDERING SYSTEM PROVIDED HISTORY: resp failure TECHNOLOGIST PROVIDED HISTORY: Reason for exam:->resp failure FINDINGS: The endotracheal tube tip is 4.8 cm above the bernadette. Nasogastric tube, right central line and pacemaker are unchanged. Is there is new airspace disease in the right lower lobe which could be atelectasis versus infiltrate. Is there are bilateral small pleural effusions which is stable on the left and increased on the right. Increased right lower lobe infiltrate versus atelectasis and small pleural effusion on the right. Unchanged left basilar airspace disease and small pleural effusion.      Xr Chest Portable    Result Date: 10/9/2020  EXAMINATION: ONE XRAY VIEW OF THE CHEST 10/9/2020 8:59 am COMPARISON: October 9, 2020 HISTORY: ORDERING SYSTEM PROVIDED HISTORY: s/p ET advancement TECHNOLOGIST PROVIDED HISTORY: Reason for exam:->s/p ET advancement Follow-up FINDINGS: Tip of the endotracheal tube terminates 3.3 cm above the bernadette. The NG tube tip is not identified. The right IJ central line catheter is stable. Low lung volumes with right infrahilar atelectasis. No pulmonary edema or pneumothorax. Stable cardiomediastinal silhouette. ET tube advancement. Right lower lobe atelectasis. Xr Chest Portable    Result Date: 10/9/2020  EXAMINATION: ONE XRAY VIEW OF THE CHEST 10/9/2020 7:02 am COMPARISON: 10/07/2020 HISTORY: ORDERING SYSTEM PROVIDED HISTORY: intubated TECHNOLOGIST PROVIDED HISTORY: Reason for exam:->intubated FINDINGS: Lines/tubes are appropriate. Moderate left effusion and left lower lobe infiltrate are unchanged. There is a tiny right effusion. Heart size is normal     No interval change     Xr Chest Portable    Result Date: 10/8/2020  EXAMINATION: ONE XRAY VIEW OF THE CHEST 10/8/2020 6:47 am COMPARISON: 10/07/2020 HISTORY: ORDERING SYSTEM PROVIDED HISTORY: intubated TECHNOLOGIST PROVIDED HISTORY: Reason for exam:->intubated FINDINGS: There is stable position of the support lines and tubes. There is minimal atelectasis seen within the right lung base unchanged when compared to prior study. There is a small left pleural effusion and left lung base atelectasis. Left upper lobe is clear. These findings are also unchanged. 1. No interval change in the small left pleural effusion and left lung base atelectasis. 2. No change in the minimal right lung base atelectasis.      Xr Chest Portable    Result Date: 10/7/2020  EXAMINATION: ONE XRAY VIEW OF THE CHEST 10/7/2020 8:17 pm COMPARISON: October 4, 2020 HISTORY: ORDERING SYSTEM PROVIDED HISTORY: intbuated TECHNOLOGIST PROVIDED HISTORY: Reason for exam:->intbuated effusion. No airspace consolidation. Dual-chamber pacemaker in place. Follow-up PA and lateral radiographs may be helpful in further evaluation. Us Dup Upper Extremity Left Venous    Result Date: 10/9/2020  EXAMINATION: VENOUS ULTRASOUND OF THE LEFT UPPER EXTREMITY 10/9/2020 10:12 am COMPARISON: None. HISTORY: ORDERING SYSTEM PROVIDED HISTORY: Left arm, r/o DVT TECHNOLOGIST PROVIDED HISTORY: Reason for exam:->Left arm, r/o DVT What reading provider will be dictating this exam?->CRC Initial exam FINDINGS: Images demonstrate occlusive thrombus within the left proximal to mid brachial vein. The remainder of the deep venous system within the left upper extremity is patent throughout. Occlusive thrombus left mid to proximal brachial vein. The findings were sent to the Radiology Results [address] at 11:31 am on 10/9/2020to be communicated to a licensed caregiver. Xr Chest Abdomen Ng Placement    Result Date: 10/7/2020  EXAMINATION: ONE SUPINE XRAY VIEW(S) OF THE ABDOMEN 10/7/2020 2:07 pm COMPARISON: None. HISTORY: ORDERING SYSTEM PROVIDED HISTORY: NGT Placement TECHNOLOGIST PROVIDED HISTORY: Reason for exam:->NGT Placement What reading provider will be dictating this exam?->CRC FINDINGS: Nasogastric tube courses below the level of the diaphragm with distal tip in the expected location of the stomach. Satisfactory position of nasogastric tube. Us Dup Lower Extremities Bilateral Venous    Result Date: 10/9/2020  EXAMINATION: DUPLEX VENOUS ULTRASOUND OF THE BILATERAL LOWER EXTREMITIES, 10/9/2020 10:12 am TECHNIQUE: Duplex ultrasound and Doppler images were obtained of the bilateral lower extremities COMPARISON: None. HISTORY: ORDERING SYSTEM PROVIDED HISTORY: r/o DVT TECHNOLOGIST PROVIDED HISTORY: Reason for exam:->r/o DVT What reading provider will be dictating this exam?->CRC FINDINGS: The visualized veins of the bilateral lower extremities are patent and free of echogenic thrombus.  The veins are normally compressible and have normal phasic flow. Examination is limited. Due to edema calf veins were not visualized. No evidence of DVT in either lower extremity. Chest Xray (10/13/2020): Moderate sized bilateral pleural effusions are noted. Jonah Levans are findings of    mild vascular congestion.  There is no right or left upper lobe infiltrate. ASSESSMENT:     Neuro   Intubated      -Grimaces to pain, more awake today            -On Fentanyl PRN for agitation            -RASS -3      -CT of head showed focal hypodensity within left frontal lobe white matter      -Repeat CT of head today        Respiratory   Mechanical ventilation            -Intubated in OR 10/07-day 7      -Pressure trials daily       -Get weaning parameters today            -PSJG as tolerated             -CXR daily              -ABG 7.464/38.1/146.3/26.7              Cardiovascular   A-fib with RVR   Hx of A-fib, on Sotalol and Metoprolol at home            -on Metoprolol 2.5 mg every 6 hours      -Given dose of Digoxin 250 mcg on 10/12; repeat today            -Cardiology following     Septic Shock  Likely due to fulminant colitis    Resolved               -Off pressors     Fluid Overload   -Urine output improved; UO 3,575 in past 24 hours   -On Bumex drip   -On Albumin 25 g IV every 8 hours     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    Fulminant Colitis  S/p total colectomy on 10/07, s/p osteomy/closure 10/10             -General surgery following       -Wound vac and JULISSA drains draining well      Protonix BID PPx      Renal   QI   Resolved              -Cre 0.6 today                  Hypophosphatemia            -Phos 2.2           -Sodium Phosphate 20 mmol given today           -Replete as needed     -TPN rate decreased to prevent refeeding syndrome      Hypomagnesemia    Resolved           -Mag 2.2           -Keep magnesium > 2    Hyperkalemia  Likely due to supplementation     -Potassium 5.2     -Treated with Insulin 10 U, calcium gluconate, and D50 once on 10/12     -TPN rate decreased to prevent refeeding syndrome                 Infectious Disease   Septic Shock   Likely due to c,diff colitis with toxic megacolon   Acute peritonitis secondary to ruptured viscous   Improving              -WBC improving 11.7              -On Zosyn day 6, Diflucan day 3        -Flagyl stopped by ID              -Body fluid culture grew candida albicans and E.coli     Fulminant Colitis   Hx of Ulcerative colitis  S/p total colectomy on 10/07              -C-diff positive               -On Zosyn day 6, Diflucan day 3          Hematology/Oncology   Anemia  Likely multi-factorial- GI bleed, anemia of chronic disease              -Hgb 6.0              -Transfuse < 7         -Received 3 units PRBCs this admission; 1 U PRBC today               -CBC daily      Thrombocytopenia/BOY   Likely due to septic shock              -Platelets 29 today              -Patient received 8 units of platelets this admission              -Stop Argatroban due to low hemoglobin     Occlusive thrombus left brachial vein             -Stop Argatroban due to low hemoglobin        Endocrine   Elevated blood glucose   -LDSSI   -Keep blood glucose < 180     Social/Spiritual/DNR/Other   Limited code  Continue TPN     Lines:  CVC [facility] 10/12/2020- day 2  Martin 10/04/2020- day 8  Midline Right- day 10  Left radial Art- day 6     Consults:  Infectious disease  Cardiology  GI  General surgery  Nephrology       PLAN:     WEAN PER PROTOCOL:  [] No   [x] Yes  [] N/A    DISCONTINUE ANY LABS:   [x] No   [] Yes    ICU PROPHYLAXIS:  Stress ulcer:  [x] PPI Agent  [] [id number] [] Sucralfate  [] Other:  VTE:   [x] Argatroban   [] Unfract.  Heparin Subcut  [] EPC Cuffs    NUTRITION:  [] NPO [] Tube Feeding (Specify: ) [x] TPN  [] PO (Diet: PN-Adult  3-in-1 Central Line (Standard))    HOME MEDICATIONS RECONCILED: [x] No  [] Yes    INSULIN DRIP:   [x] No   [] Yes    CONSULTATION NEEDED:  [x] No   [] Yes    FAMILY UPDATED:    [] No   [x] Yes    TRANSFER OUT OF ICU:   [x] No   [] Yes    ADDITIONAL PLAN:    Jose Almendarez MD, PGY-2          10/13/2020, 5:57 AM     Addendum:        I personally saw, examined and provided care for the patient. Radiographs, labs and medication list were reviewed by me independently. Heparin induced antibody is negative. I spoke with bedside nursing, therapists and consultants. Critical care services and times documented are independent of procedures and multidisciplinary rounds with Residents. Additionally comprehensive, multidisciplinary rounds were conducted with the MICU team. The case was discussed in detail and plans for care were established. Review of Residents documentation was conducted and revisions were made as appropriate. I agree with the above documented exam, problem list and plan of care. I personally contacted Cameron Mackay patient's daughter. Gave her an update. Patient has tolerated pressure support for about 8 hours today but weaning parameters at this point not meeting criteria for extubation. Cameron Mackay would like to wait till Thursday before proceeding with removal of the ET tube and for us to continue weaning trials. Follow H&H posttransfusion. Replace electrolytes. We will give 1 more additional dose of dig today. Continue antibiotics per infectious disease recommendations. .    I personally saw, examined and provided care for the patient. Radiographs, labs and medication list were reviewed by me independently. I spoke with bedside nursing, therapists and consultants. Critical care services and times documented are independent of procedures and multidisciplinary rounds with Residents. Additionally comprehensive, multidisciplinary rounds were conducted with the MICU team. The case was discussed in detail and plans for care were established.  Review of Residents documentation was conducted and revisions were made as appropriate. I agree with the above documented exam, problem list and plan of care.       Doron Workman MD   CCT excluding procedures:45'

## 2020-10-13 NOTE — PROGRESS NOTES
GENERAL SURGERY  DAILY PROGRESS NOTE  10/13/2020        Chief Complaint   Patient presents with    Hypotension    Diarrhea       Subjective: Intubated, blinks       Objective:  /61   Pulse 115   Temp 98.2 °F (36.8 °C) (Axillary)   Resp 15   Ht 5' 6\" (1.676 m)   Wt 238 lb 5.1 oz (108.1 kg)   SpO2 99%   BMI 38.47 kg/m²     GENERAL: Intubated sedated  LUNGS:  No increased work of breathing on ventilator  CARDIOVASCULAR:  Afib RVR, -130s. ABDOMEN:  Soft, incisional wound vac, 2 drains serosanguinous, ileostomy dusky but viable with bowel sweat      Assessment/Plan:  82 y.o. female with septic shock nowand c. Diff colitis s/p total colectomy, left in discontinuity with abthera placement 10/7, s/p reexploration and loop ileostomy creation with abdominal wall closure 10/10     - Abx per ID  - TPN  - monitor for ileostomy output  - argatroban    - Appreciated critical care  - Appreciate consultant recommendations  - repeat CT head today    Electronically signed by Cony Wilson MD on 10/13/2020 at 5:09 AM     Attending Physician Statement:    Chief Complaint:   Chief Complaint   Patient presents with    Hypotension    Diarrhea       I have examined the patient and performed the key aspects of physical exam, reviewed the record (including all pertinent and new radiology images and laboratory findings), and discussed the case with the surgical team.  I agree with the assessment and plan with the following additions, corrections, and changes. 14pt review of symptoms completed and negative except as mentioned. Not doing much neurowise still. Right JULISSA more serosang today, left also. HgB 6, WBC 11. Cont TPN. Abx per ID. Repeat head CT today. Transfuse pRBC. VAC change per wound care today. Seems overall poor prognosis. Kenya Stanley MD  10/13/20  7:37 AM    NOTE: This report, in part or full, may have been transcribed using voice recognition software.  Every effort was made to ensure accuracy; however, inadvertent computerized transcription errors may be present. Please excuse any transcriptional grammatical or spelling errors that may have escaped my editorial review.

## 2020-10-13 NOTE — PROGRESS NOTES
Priti Cornejo Hospitalist   Progress Note    Admitting Date and Time: 10/4/2020  4:26 PM  Admit Dx: Septic shock ([address] [A41.9, R65.21]  Septic shock ([address] [A41.9, R65.21]    Subjective:    Patient was admitted with Septic shock ([address] [A41.9, R65.21]  Septic shock ([address] [A41.9, R65.21]. Patient intubated.  sodium chloride  20 mL Intravenous Once    pantoprazole  40 mg Intravenous BID    albumin human  25 g Intravenous Q8H    fluconazole  400 mg Intravenous Q24H    metoprolol  2.5 mg Intravenous Q6H    insulin lispro  0-6 Units Subcutaneous Q6H    sodium chloride flush  10 mL Intravenous BID    piperacillin-tazobactam  3.375 g Intravenous Q8H    chlorhexidine  15 mL Mouth/Throat BID     fentanNYL, 25 mcg, Q4H PRN  artificial tears, , PRN         Objective:          PHYSICAL EXAM:    Vitals:  BP (!) 100/42   Pulse 107   Temp 98.2 °F (36.8 °C) (Axillary)   Resp 17   Ht 5' 6\" (1.676 m)   Wt 238 lb 5.1 oz (108.1 kg)   SpO2 100%   BMI 38.47 kg/m²     General:  Appears comfortable. Pt intubated  HEENT:  Mucous membranes moist. No erythema, rhinorrhea, or post-nasal drip noted. Neck:  No carotid bruits. Heart:  Rhythm regular at rate of 104  Lungs:  CTA. No wheeze, rales, or rhonchi  Abdomen:  Positive bowel sounds positive. Soft. Non-tender. No guarding, rebound or rigidity. Breast/Rectal/Genitourinary: not pertinent. Extremities:  positive for 1+ b/l lower extremity edema  Skin:  Warm and dry  Vascular: 2/4 Dorsalis Pedis pulses bilaterally.   Neuro:  Limited due to pt's status                Recent Labs     10/11/20  0600 10/12/20  0545 10/12/20  1115 10/13/20  0515    144  --  140   K 5.0 5.7* 5.3* 5.2*   * 113*  --  107   CO2 27 28  --  30*   BUN 28* 29*  --  27*   CREATININE 0.7 0.5  --  0.6   GLUCOSE 146* 131*  --  137*   CALCIUM 7.4* 7.5*  --  8.0*       Recent Labs     10/11/20  0600 10/11/20  2220 10/12/20  0545 10/13/20  0515   WBC 23.6*  --  17.7* 11.4   RBC 2.65*  --  2.56* 2.03*   HGB 7.8* 7.4* 7.6* 6.0*   HCT 24.6* 23.3* 24.0* 19.5*   MCV 92.8  --  93.8 96.1   MCH 29.4  --  29.7 29.6   MCHC 31.7*  --  31.7* 30.8*   RDW 19.9*  --  19.9* 19.6*   PLT 44*  --  29* 29*   MPV 10.9  --  10.9 10.9       CBC with Differential:    Lab Results   Component Value Date    WBC 11.4 10/13/2020    RBC 2.03 10/13/2020    HGB 6.0 10/13/2020    HCT 19.5 10/13/2020    PLT 29 10/13/2020    MCV 96.1 10/13/2020    MCH 29.6 10/13/2020    MCHC 30.8 10/13/2020    RDW 19.6 10/13/2020    NRBC 2.0 10/13/2020    METASPCT 2.0 10/13/2020    LYMPHOPCT 18.0 10/13/2020    PROMYELOPCT 1.0 10/10/2020    MONOPCT 5.0 10/13/2020    MYELOPCT 4.0 10/10/2020    BASOPCT 0.0 10/13/2020    MONOSABS 0.57 10/13/2020    LYMPHSABS 2.05 10/13/2020    EOSABS 0.00 10/13/2020    BASOSABS 0.00 10/13/2020     CMP:    Lab Results   Component Value Date     10/13/2020    K 5.2 10/13/2020    K 3.8 10/09/2020     10/13/2020    CO2 30 10/13/2020    BUN 27 10/13/2020    CREATININE 0.6 10/13/2020    GFRAA >60 10/13/2020    LABGLOM >60 10/13/2020    GLUCOSE 137 10/13/2020    PROT 5.0 10/13/2020    LABALBU 2.9 10/13/2020    CALCIUM 8.0 10/13/2020    BILITOT 1.7 10/13/2020    ALKPHOS 249 10/13/2020    AST 53 10/13/2020    ALT 17 10/13/2020     Ionized Calcium:  No results found for: IONCA  Magnesium:    Lab Results   Component Value Date    MG 2.2 10/13/2020     Phosphorus:    Lab Results   Component Value Date    PHOS 2.2 10/13/2020     PT/INR:    Lab Results   Component Value Date    PROTIME 38.8 10/13/2020    INR 3.2 10/13/2020     PTT:    Lab Results   Component Value Date    APTT 80.8 10/13/2020   [APTT}  ABG:    Lab Results   Component Value Date    PH 7.464 10/13/2020    PCO2 38.1 10/13/2020    PO2 146.3 10/13/2020    HCO3 26.7 10/13/2020    BE 2.8 10/13/2020    O2SAT 99.1 10/13/2020        Radiology:   CT HEAD WO CONTRAST   Final Result   1. No acute intracranial abnormality.    2. Diffuse cerebral atrophy with Opacities are present in left lung base which appear to have increased in   could suggest increasing pneumonia, atelectasis, or effusion. XR CHEST ABDOMEN NG PLACEMENT   Final Result   Satisfactory position of nasogastric tube. XR ABDOMEN (KUB) (SINGLE AP VIEW)   Final Result   Transverse, descending, and sigmoid colon mural thickening may reflect edema   and/or inflammation/infection, without significant interval change. Slight prominence of gas throughout the small intestine, without gross   distension, suggestive of paralytic ileus. Persisting left lung base opacity may be atelectasis with pleural effusion. Differential again includes infectious infiltrate         XR ABDOMEN (KUB) (SINGLE AP VIEW)   Final Result   Thickening of the transverse colon wall. Consistent with inflammation   identified on prior CT. US GALLBLADDER RUQ   Final Result   Cholelithiasis with positive sonographic Hopson's sign but no wall thickening   or pericholecystic inflammatory changes, altogether equivocal for   cholecystitis. Consider functional analysis with nuclear medicine HIDA scan. Hepatic steatosis. 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 Suspected small right pleural effusion. CT ABDOMEN PELVIS W IV CONTRAST Additional Contrast? None   Final Result   Addendum 1 of 1   ADDENDUM:   In the title of the examination, disregard the CT abdomen and pelvis. Final      CT ABDOMEN PELVIS W IV CONTRAST Additional Contrast? None   Final Result   Diffuse inflammation of the transverse colon, descending and rectosigmoid   colon with wall thickening and edema with active contrast   extravasation/bleeding. Hemorrhagic diverticulitis or colitis are   considered. There is some edema in the presacral area and tiny amount of air   collection in the rectovesical pouch. Walled-off microperforation is   considered. Distended gallbladder. Atelectasis/pleural effusion in the left lung base. RECOMMENDATIONS:   The clinical service was notified         XR CHEST PORTABLE   Final Result   Addendum 1 of 1   ADDENDUM:   In the title of the examination, disregard the CT abdomen and pelvis. Final      XR CHEST 1 VIEW   Final Result   Suspect left pleural effusion. No airspace consolidation. Dual-chamber   pacemaker in place. Follow-up PA and lateral radiographs may be helpful in   further evaluation. XR CHEST PORTABLE    (Results Pending)       Assessment:    Active Problems:    Septic shock (HCC)    Hyponatremia    Diarrhea    UTI (urinary tract infection) with pyuria    Anemia    Leukocytosis    Acidosis    Hypotension    Sick sinus syndrome (HCC)    Cardiac pacemaker in situ    C. difficile colitis    QI (acute kidney injury) ([facility]    Thrombocytopenia (HCC)    Anemia associated with acute blood loss  Resolved Problems:    * No resolved hospital problems. *      Plan:  1. Hypotension (septic shock vs hypovolemia) monitor bp  intensivist following. Pressors per intensivist.   2. sepsis(leukocytosis, RR noted to go up to 21 and 33, infection)POA supportive care and tx underlying infection  3. uti possibly due to catheter treatment started as outpt and continued here for now.  ID following. 4. C. Diff colitis, fuminant/toxic megacolon  abx per ID   Surgery following. Ardyth Crate following.  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 Peritonitis associated with c. Diff colitis  abx per ID and surgery following  6. UC less likely   bx reviewed and appears to be idiopathic colitis on bx. D/w gi. Pseudomembranous colitis likely and recommended surgical consult with notation of microperforation. Gi following peripherally  7. Hyponatremia improved monitor  8. qi renal following  Fluids per renal. improving  9.  leukocytosis possibly multifactorial monitor closely  10. Atelectasis contributing to leukocytosis  monitor  11.  Anemia likely with acute blood loss component monitor and transfuse prn  12. Acidosis/lactic acidosis monitor  13. Hypoalbuminemia edema likely due to third spacing  14. Pleural effusion monitor low albumin may also be contributing  15. Atrial fib monitor and tx. Cardiology on consult  16. htn stop lisinopril  Monitor bp  17. LUE dvt  Monitor pt closely  Hematology on consult  Argatroban per intensivist/hematology  Argatroban currently stopped  18. Hypokalemia monitor and replace prn  19. Hypophosphatemia monitor and replace prn  20. Hyperkalemia monitor and tx  21. 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  Possible cva monitor  Repeat ct reviewed    Chart reviewed and updated by nursing    Time spent is 35 min      Electronically signed by Denese Denver, DO on 10/13/2020 at 2:33 PM

## 2020-10-13 NOTE — PLAN OF CARE
Problem: Skin Integrity:  Goal: Will show no infection signs and symptoms  Description: Will show no infection signs and symptoms  10/13/2020 0124 by Rosina Noriega RN  Outcome: Met This Shift  10/12/2020 1733 by Billy Chung RN  Outcome: Met This Shift  Goal: Absence of new skin breakdown  Description: Absence of new skin breakdown  Outcome: Met This Shift     Problem: Falls - Risk of:  Goal: Will remain free from falls  Description: Will remain free from falls  10/13/2020 0124 by Rosina Marques RN  Outcome: Met This Shift  10/12/2020 1733 by Billy Chung RN  Outcome: Met This Shift  Goal: Absence of physical injury  Description: Absence of physical injury  Outcome: Met This Shift     Problem: Diarrhea:  Goal: Bowel elimination is within specified parameters  Description: Bowel elimination is within specified parameters  10/13/2020 0124 by Rosina Noriega RN  Outcome: Met This Shift  10/12/2020 1733 by Billy Chung RN  Outcome: Not Met This Shift  Goal: Passage of soft, formed stool  Description: Passage of soft, formed stool  10/13/2020 0124 by Rosina Marques RN  Outcome: Met This Shift  10/12/2020 1733 by Billy Chung RN  Outcome: Not Met This Shift  Goal: Establishment of normal bowel function will improve to within specified parameters  Description: Establishment of normal bowel function will improve to within specified parameters  Outcome: Met This Shift     Problem: Pain:  Goal: Pain level will decrease  Description: Pain level will decrease  10/13/2020 0124 by Rosina Marques RN  Outcome: Met This Shift  10/12/2020 1733 by Billy Chung RN  Outcome: Met This Shift  Goal: Control of acute pain  Description: Control of acute pain  Outcome: Met This Shift  Goal: Control of chronic pain  Description: Control of chronic pain  Outcome: Met This Shift     Problem: Inadequate oral food/beverage intake (NI-2.1)  Goal: Food and/or Nutrient Delivery  Description: Individualized approach for food/nutrient provision.   10/12/2020 1255 by Rebeca Jon RD, JEANNA, LD  Outcome: Met This Shift  10/12/2020 1254 by Rebeca Jon RD, JEANNA, LD  Outcome: Ongoing     Problem: Airway Clearance - Ineffective:  Goal: Ability to maintain a clear airway will improve  Description: Ability to maintain a clear airway will improve  Outcome: Met This Shift     Problem: Tissue Perfusion:  Goal: Peripheral tissue perfusion will improve  Description: Peripheral tissue perfusion will improve  Outcome: Met This Shift

## 2020-10-13 NOTE — PROGRESS NOTES
Pharmacy Consultation Note  (Argatroban Dosing and Monitoring)    Initial consult date: 10/11/2020  Consulting physician: [place]    Note argatroban discontinued. Clinical pharmacy will sign-off. Please re-consult if we can be of further assistance.     Mckenna Kohler, PharmD, [facility]  10/13/2020  10:35 AM

## 2020-10-13 NOTE — PLAN OF CARE
Problem: Skin Integrity:  Goal: Will show no infection signs and symptoms  Description: Will show no infection signs and symptoms  10/13/2020 0214 by Louann Gambino. JAYLYN Little  Outcome: Met This Shift  10/13/2020 0124 by Louann Gambino. Genaro Olguin RN  Outcome: Met This Shift  10/12/2020 1733 by Candice Limon RN  Outcome: Met This Shift  Goal: Absence of new skin breakdown  Description: Absence of new skin breakdown  10/13/2020 0214 by Louann Gambino. JAYLYN Little  Outcome: Met This Shift  10/13/2020 0124 by Louann Gambino.  JAYLYN Little  Outcome: Met This Shift

## 2020-10-13 NOTE — PLAN OF CARE
Problem: Skin Integrity:  Goal: Will show no infection signs and symptoms  Description: Will show no infection signs and symptoms  10/13/2020 0214 by Chelsea Little RN  Outcome: Met This Shift  10/13/2020 0124 by Chelsea Alcala. Brian Stein RN  Outcome: Met This Shift  10/12/2020 1733 by Melva Mayers RN  Outcome: Met This Shift  Goal: Absence of new skin breakdown  Description: Absence of new skin breakdown  10/13/2020 0214 by Chelsea Little RN  Outcome: Met This Shift  10/13/2020 0124 by Chelsea Little RN  Outcome: Met This Shift

## 2020-10-13 NOTE — PROGRESS NOTES
[address] [place] [address] [facility]  TG  Progress Note    SUBJECTIVE:  Chief Complaint   Patient presents with    Hypotension    Diarrhea     Events noted. The patient remains unresponsive. She is still in the ICU. She is on pressure support. Tolerating antibiotics. Review of systems:  As stated above in the chief complaint, otherwise negative. Medications:  Scheduled Meds:   sodium chloride  20 mL Intravenous Once    pantoprazole  40 mg Intravenous BID    sodium phosphate IVPB  20 mmol Intravenous Once    albumin human  25 g Intravenous Q8H    fluconazole  400 mg Intravenous Q24H    metoprolol  2.5 mg Intravenous Q6H    insulin lispro  0-6 Units Subcutaneous Q6H    sodium chloride flush  10 mL Intravenous BID    piperacillin-tazobactam  3.375 g Intravenous Q8H    chlorhexidine  15 mL Mouth/Throat BID     Continuous Infusions:   bumetanide 0.1 mg/mL infusion 0.2 mg/hr (10/12/20 1057)    PN-Adult  3 IN 1 Central Line (Standard) 66.7 mL/hr at 10/12/20 1749    sodium chloride 12.5 mL/hr at 10/13/20 0635     PRN Meds:fentanNYL, [DISCONTINUED] lubrifresh P.M. **AND** artificial tears    OBJECTIVE:  /61   Pulse 115   Temp 98.2 °F (36.8 °C) (Axillary)   Resp 19   Ht 5' 6\" (1.676 m)   Wt 238 lb 5.1 oz (108.1 kg)   SpO2 99%   BMI 38.47 kg/m²   Temp  Av.1 °F (36.7 °C)  Min: 96.2 °F (35.7 °C)  Max: 98.7 °F (37.1 °C)  Constitutional: The patient is lying in bed in the ICU. She is intubated and unresponsive. Daughter in room. Skin: Warm and dry. No rashes were noted. HEENT: Round pupils. Moist mucous membranes. No ulcerations or thrush. ET tube. OG tube. Neck: Supple to movements. Chest: No respiratory distress. Good breath sounds. No crackles. Left pacer. Cardiovascular: Heart sounds rhythmic and regular. Abdomen: Abdomen shows a larger wound VAC. Absent bowel sounds. Right ileostomy. 2 JULISSA drains. The left has a fair amount of serosanguineous fluid. 10/7/2020  · Status post second look exploratory laparotomy, small bowel resection, and loop ileostomy and wound VAC 10/10/2020  · Peritonitis associated to C. difficile colitis with perforation.   Intraoperative cultures growing E. coli, Candida albicans and anaerobic gram-positive rods  · Leukemoid reaction associated to fulminant colitis, resolving  · History of UTI, being treated with Ceftriaxone at the nursing facility  · Acute kidney injury, improving  · Possible ischemic stroke    PLAN:  · Continue Zosyn and Fluconazole  · Repeat CT of the head    Discussed with nursing    Amanda He  9:54 AM  10/13/2020

## 2020-10-13 NOTE — PLAN OF CARE
Problem: Skin Integrity:  Goal: Will show no infection signs and symptoms  Description: Will show no infection signs and symptoms  Outcome: Met This Shift  Goal: Absence of new skin breakdown  Description: Absence of new skin breakdown  Outcome: Met This Shift     Problem: Falls - Risk of:  Goal: Will remain free from falls  Description: Will remain free from falls  Outcome: Met This Shift  Goal: Absence of physical injury  Description: Absence of physical injury  Outcome: Met This Shift     Problem: Pain:  Goal: Pain level will decrease  Description: Pain level will decrease  Outcome: Met This Shift  Goal: Control of acute pain  Description: Control of acute pain  Outcome: Met This Shift     Problem: Airway Clearance - Ineffective:  Goal: Ability to maintain a clear airway will improve  Description: Ability to maintain a clear airway will improve  Outcome: Met This Shift     Problem: Tissue Perfusion:  Goal: Peripheral tissue perfusion will improve  Description: Peripheral tissue perfusion will improve  Outcome: Ongoing     Problem: Diarrhea:  Goal: Bowel elimination is within specified parameters  Description: Bowel elimination is within specified parameters  Outcome: Not Met This Shift  Goal: Passage of soft, formed stool  Description: Passage of soft, formed stool  Outcome: Not Met This Shift  Goal: Establishment of normal bowel function will improve to within specified parameters  Description: Establishment of normal bowel function will improve to within specified parameters  Outcome: Not Met This Shift

## 2020-10-14 ENCOUNTER — APPOINTMENT (OUTPATIENT)
Dept: GENERAL RADIOLOGY | Age: 85
DRG: 853 | End: 2020-10-14
Payer: MEDICARE

## 2020-10-14 LAB
ALBUMIN SERPL-MCNC: 3.2 G/DL (ref 3.5–5.2)
ALP BLD-CCNC: 284 U/L (ref 35–104)
ALT SERPL-CCNC: 18 U/L (ref 0–32)
ANION GAP SERPL CALCULATED.3IONS-SCNC: 6 MMOL/L (ref 7–16)
ANISOCYTOSIS: ABNORMAL
APTT: 68.6 SEC (ref 24.5–35.1)
AST SERPL-CCNC: 57 U/L (ref 0–31)
B.E.: 2.6 MMOL/L (ref -3–0)
BASOPHILS ABSOLUTE: 0 E9/L (ref 0–0.2)
BASOPHILS RELATIVE PERCENT: 0 % (ref 0–2)
BILIRUB SERPL-MCNC: 2.8 MG/DL (ref 0–1.2)
BUN BLDV-MCNC: 32 MG/DL (ref 8–23)
CALCIUM IONIZED: 1.16 MMOL/L (ref 1.15–1.33)
CALCIUM SERPL-MCNC: 8.3 MG/DL (ref 8.6–10.2)
CHLORIDE BLD-SCNC: 103 MMOL/L (ref 98–107)
CO2: 31 MMOL/L (ref 22–29)
CREAT SERPL-MCNC: 0.7 MG/DL (ref 0.5–1)
DELIVERY SYSTEMS: ABNORMAL
DEVICE: ABNORMAL
EOSINOPHILS ABSOLUTE: 0 E9/L (ref 0.05–0.5)
EOSINOPHILS RELATIVE PERCENT: 0 % (ref 0–6)
FIO2 ARTERIAL: 35
GFR AFRICAN AMERICAN: >60
GFR NON-AFRICAN AMERICAN: >60 ML/MIN/1.73
GLUCOSE BLD-MCNC: 127 MG/DL (ref 74–99)
HCO3 ARTERIAL: 27.3 MMOL/L (ref 22–26)
HCT VFR BLD CALC: 22.1 % (ref 34–48)
HEMOGLOBIN: 6.9 G/DL (ref 11.5–15.5)
HEPARIN PF4 ANTIBODY: 0.2 OD
HYPOCHROMIA: ABNORMAL
INR BLD: 2.8
LYMPHOCYTES ABSOLUTE: 1.04 E9/L (ref 1.5–4)
LYMPHOCYTES RELATIVE PERCENT: 14.9 % (ref 20–42)
MAGNESIUM: 2 MG/DL (ref 1.6–2.6)
MCH RBC QN AUTO: 29.5 PG (ref 26–35)
MCHC RBC AUTO-ENTMCNC: 31.2 % (ref 32–34.5)
MCV RBC AUTO: 94.4 FL (ref 80–99.9)
METER GLUCOSE: 129 MG/DL (ref 74–99)
METER GLUCOSE: 133 MG/DL (ref 74–99)
MODE: AC
MONOCYTES ABSOLUTE: 0.48 E9/L (ref 0.1–0.95)
MONOCYTES RELATIVE PERCENT: 7 % (ref 2–12)
NEUTROPHILS ABSOLUTE: 5.38 E9/L (ref 1.8–7.3)
NEUTROPHILS RELATIVE PERCENT: 78.1 % (ref 43–80)
NUCLEATED RED BLOOD CELLS: 7.9 /100 WBC
O2 SATURATION: 99.6 % (ref 92–98.5)
OPERATOR ID: 3342
PATIENT TEMP: 37
PCO2 (TEMP CORRECTED): 41.3 MMHG (ref 35–45)
PDW BLD-RTO: 18.5 FL (ref 11.5–15)
PH (TEMPERATURE CORRECTED): 7.43 (ref 7.35–7.45)
PHOSPHORUS: 2.5 MG/DL (ref 2.5–4.5)
PLATELET # BLD: 28 E9/L (ref 130–450)
PLATELET CONFIRMATION: NORMAL
PMV BLD AUTO: 12.3 FL (ref 7–12)
PO2 (TEMP CORRECTED): 180.8 MMHG (ref 60–80)
POLYCHROMASIA: ABNORMAL
POSITIVE END EXP PRESS: 5 CMH2O
POTASSIUM SERPL-SCNC: 4.4 MMOL/L (ref 3.5–5)
PROTHROMBIN TIME: 33.4 SEC (ref 9.3–12.4)
RBC # BLD: 2.34 E12/L (ref 3.5–5.5)
RESPIRATORY RATE: 12 B/MIN
SODIUM BLD-SCNC: 140 MMOL/L (ref 132–146)
SOURCE, BLOOD GAS: ABNORMAL
TIDAL VOLUME: 400 ML
TOTAL PROTEIN: 5.2 G/DL (ref 6.4–8.3)
WBC # BLD: 6.9 E9/L (ref 4.5–11.5)

## 2020-10-14 PROCEDURE — 36592 COLLECT BLOOD FROM PICC: CPT

## 2020-10-14 PROCEDURE — 2700000000 HC OXYGEN THERAPY PER DAY

## 2020-10-14 PROCEDURE — 83735 ASSAY OF MAGNESIUM: CPT

## 2020-10-14 PROCEDURE — C9113 INJ PANTOPRAZOLE SODIUM, VIA: HCPCS | Performed by: STUDENT IN AN ORGANIZED HEALTH CARE EDUCATION/TRAINING PROGRAM

## 2020-10-14 PROCEDURE — 94003 VENT MGMT INPAT SUBQ DAY: CPT

## 2020-10-14 PROCEDURE — 2500000003 HC RX 250 WO HCPCS: Performed by: INTERNAL MEDICINE

## 2020-10-14 PROCEDURE — 82803 BLOOD GASES ANY COMBINATION: CPT

## 2020-10-14 PROCEDURE — 6360000002 HC RX W HCPCS: Performed by: STUDENT IN AN ORGANIZED HEALTH CARE EDUCATION/TRAINING PROGRAM

## 2020-10-14 PROCEDURE — 2580000003 HC RX 258: Performed by: SURGERY

## 2020-10-14 PROCEDURE — 71045 X-RAY EXAM CHEST 1 VIEW: CPT

## 2020-10-14 PROCEDURE — 1200000000 HC SEMI PRIVATE

## 2020-10-14 PROCEDURE — 6370000000 HC RX 637 (ALT 250 FOR IP): Performed by: STUDENT IN AN ORGANIZED HEALTH CARE EDUCATION/TRAINING PROGRAM

## 2020-10-14 PROCEDURE — 82330 ASSAY OF CALCIUM: CPT

## 2020-10-14 PROCEDURE — 85610 PROTHROMBIN TIME: CPT

## 2020-10-14 PROCEDURE — 84100 ASSAY OF PHOSPHORUS: CPT

## 2020-10-14 PROCEDURE — 6360000002 HC RX W HCPCS: Performed by: INTERNAL MEDICINE

## 2020-10-14 PROCEDURE — 6360000002 HC RX W HCPCS: Performed by: SPECIALIST

## 2020-10-14 PROCEDURE — 2500000003 HC RX 250 WO HCPCS: Performed by: STUDENT IN AN ORGANIZED HEALTH CARE EDUCATION/TRAINING PROGRAM

## 2020-10-14 PROCEDURE — 36415 COLL VENOUS BLD VENIPUNCTURE: CPT

## 2020-10-14 PROCEDURE — 2580000003 HC RX 258: Performed by: INTERNAL MEDICINE

## 2020-10-14 PROCEDURE — 80053 COMPREHEN METABOLIC PANEL: CPT

## 2020-10-14 PROCEDURE — 82962 GLUCOSE BLOOD TEST: CPT

## 2020-10-14 PROCEDURE — 99233 SBSQ HOSP IP/OBS HIGH 50: CPT | Performed by: INTERNAL MEDICINE

## 2020-10-14 PROCEDURE — 2580000003 HC RX 258: Performed by: SPECIALIST

## 2020-10-14 PROCEDURE — P9047 ALBUMIN (HUMAN), 25%, 50ML: HCPCS | Performed by: INTERNAL MEDICINE

## 2020-10-14 PROCEDURE — 85025 COMPLETE CBC W/AUTO DIFF WBC: CPT

## 2020-10-14 PROCEDURE — 85730 THROMBOPLASTIN TIME PARTIAL: CPT

## 2020-10-14 RX ORDER — MORPHINE SULFATE 2 MG/ML
INJECTION, SOLUTION INTRAMUSCULAR; INTRAVENOUS
Status: DISCONTINUED
Start: 2020-10-14 | End: 2020-10-14

## 2020-10-14 RX ORDER — FENTANYL CITRATE 50 UG/ML
12.5 INJECTION, SOLUTION INTRAMUSCULAR; INTRAVENOUS ONCE
Status: COMPLETED | OUTPATIENT
Start: 2020-10-14 | End: 2020-10-14

## 2020-10-14 RX ORDER — MORPHINE SULFATE 2 MG/ML
2 INJECTION, SOLUTION INTRAMUSCULAR; INTRAVENOUS
Status: DISCONTINUED | OUTPATIENT
Start: 2020-10-14 | End: 2020-10-15 | Stop reason: HOSPADM

## 2020-10-14 RX ORDER — FENTANYL CITRATE 50 UG/ML
INJECTION, SOLUTION INTRAMUSCULAR; INTRAVENOUS
Status: DISPENSED
Start: 2020-10-14 | End: 2020-10-15

## 2020-10-14 RX ORDER — LORAZEPAM 2 MG/ML
1 INJECTION INTRAMUSCULAR
Status: DISCONTINUED | OUTPATIENT
Start: 2020-10-14 | End: 2020-10-15 | Stop reason: HOSPADM

## 2020-10-14 RX ORDER — DIGOXIN 0.25 MG/ML
125 INJECTION INTRAMUSCULAR; INTRAVENOUS DAILY
Status: DISCONTINUED | OUTPATIENT
Start: 2020-10-14 | End: 2020-10-14

## 2020-10-14 RX ORDER — LOPERAMIDE HYDROCHLORIDE 2 MG/1
2 CAPSULE ORAL PRN
Status: DISCONTINUED | OUTPATIENT
Start: 2020-10-14 | End: 2020-10-14

## 2020-10-14 RX ORDER — LORAZEPAM 1 MG/1
1 TABLET ORAL
Status: DISCONTINUED | OUTPATIENT
Start: 2020-10-14 | End: 2020-10-14

## 2020-10-14 RX ORDER — ONDANSETRON 2 MG/ML
4 INJECTION INTRAMUSCULAR; INTRAVENOUS EVERY 6 HOURS PRN
Status: DISCONTINUED | OUTPATIENT
Start: 2020-10-14 | End: 2020-10-14

## 2020-10-14 RX ORDER — 0.9 % SODIUM CHLORIDE 0.9 %
20 INTRAVENOUS SOLUTION INTRAVENOUS ONCE
Status: DISCONTINUED | OUTPATIENT
Start: 2020-10-14 | End: 2020-10-14

## 2020-10-14 RX ORDER — GLYCOPYRROLATE 0.2 MG/ML
0.2 INJECTION INTRAMUSCULAR; INTRAVENOUS EVERY 4 HOURS PRN
Status: DISCONTINUED | OUTPATIENT
Start: 2020-10-14 | End: 2020-10-15 | Stop reason: HOSPADM

## 2020-10-14 RX ORDER — MORPHINE SULFATE 4 MG/ML
4 INJECTION, SOLUTION INTRAMUSCULAR; INTRAVENOUS
Status: DISCONTINUED | OUTPATIENT
Start: 2020-10-14 | End: 2020-10-15 | Stop reason: HOSPADM

## 2020-10-14 RX ORDER — MORPHINE SULFATE 8 MG/ML
10 INJECTION, SOLUTION INTRAMUSCULAR; INTRAVENOUS ONCE
Status: COMPLETED | OUTPATIENT
Start: 2020-10-14 | End: 2020-10-14

## 2020-10-14 RX ADMIN — METOPROLOL TARTRATE 2.5 MG: 5 INJECTION INTRAVENOUS at 05:59

## 2020-10-14 RX ADMIN — MORPHINE SULFATE 10 MG: 8 INJECTION INTRAVENOUS at 13:30

## 2020-10-14 RX ADMIN — ALBUMIN (HUMAN) 25 G: 0.25 INJECTION, SOLUTION INTRAVENOUS at 09:16

## 2020-10-14 RX ADMIN — GLYCOPYRROLATE 0.2 MG: 0.2 INJECTION INTRAMUSCULAR; INTRAVENOUS at 13:34

## 2020-10-14 RX ADMIN — PANTOPRAZOLE SODIUM 40 MG: 40 INJECTION, POWDER, FOR SOLUTION INTRAVENOUS at 09:22

## 2020-10-14 RX ADMIN — MORPHINE SULFATE 2 MG: 2 INJECTION, SOLUTION INTRAMUSCULAR; INTRAVENOUS at 17:12

## 2020-10-14 RX ADMIN — CHLORHEXIDINE GLUCONATE 0.12% ORAL RINSE 15 ML: 1.2 LIQUID ORAL at 09:22

## 2020-10-14 RX ADMIN — Medication 10 ML: at 09:22

## 2020-10-14 RX ADMIN — Medication 10 ML: at 20:27

## 2020-10-14 RX ADMIN — FLUCONAZOLE 400 MG: 2 INJECTION, SOLUTION INTRAVENOUS at 11:30

## 2020-10-14 RX ADMIN — FENTANYL CITRATE 12.5 MCG: 50 INJECTION, SOLUTION INTRAMUSCULAR; INTRAVENOUS at 11:54

## 2020-10-14 RX ADMIN — PIPERACILLIN SODIUM AND TAZOBACTAM SODIUM 3.38 G: 3; .375 INJECTION, POWDER, LYOPHILIZED, FOR SOLUTION INTRAVENOUS at 11:29

## 2020-10-14 RX ADMIN — ALBUMIN (HUMAN) 25 G: 0.25 INJECTION, SOLUTION INTRAVENOUS at 00:52

## 2020-10-14 RX ADMIN — PIPERACILLIN SODIUM AND TAZOBACTAM SODIUM 3.38 G: 3; .375 INJECTION, POWDER, LYOPHILIZED, FOR SOLUTION INTRAVENOUS at 03:30

## 2020-10-14 ASSESSMENT — PAIN SCALES - GENERAL
PAINLEVEL_OUTOF10: 0
PAINLEVEL_OUTOF10: 2
PAINLEVEL_OUTOF10: 0
PAINLEVEL_OUTOF10: 2
PAINLEVEL_OUTOF10: 0
PAINLEVEL_OUTOF10: 2

## 2020-10-14 ASSESSMENT — PULMONARY FUNCTION TESTS
PIF_VALUE: 19
PIF_VALUE: 20
PIF_VALUE: 17
PIF_VALUE: 22
PIF_VALUE: 18
PIF_VALUE: 19
PIF_VALUE: 18
PIF_VALUE: 20
PIF_VALUE: 18
PIF_VALUE: 20
PIF_VALUE: 20
PIF_VALUE: 23
PIF_VALUE: 19
PIF_VALUE: 20
PIF_VALUE: 21

## 2020-10-14 NOTE — PLAN OF CARE
Intensive Care Daily Quality Rounding Checklist      ICU Team Members:     ICU Day #: 7    Intubation Date: 10/70/ 2020  Ventilator Day #: 8    Central Line Insertion Date: 10/12 /2020        Day #: 3     Arterial Line Insertion Date: 10/7/2020      Day #: 8    DVT Prophylaxis:    GI Prophylaxis: Protonix  Martin Catheter Insertion Date:  10/4/2020       Day #: 11      Continued need (if yes, reason documented and discussed with physician):     Skin Issues/ Wounds and ordered treatment discussed on rounds:     Goals/ Plans for the Day: vent management, monitor labs and replace e- as needed, transfuse one unit of PRBC today, wean parameter as pt tolerates, add additional pressor as needed, continue daily digoxin,

## 2020-10-14 NOTE — PROGRESS NOTES
Report called to [address] [place] [address] on [address] for patient to be transferred to room 545. All pertinent information disclosed during nurse to nurse report. Patient's family notified of upcoming transfer. All patient needs met at this time.

## 2020-10-14 NOTE — PROGRESS NOTES
[facility]    Liaison Information Visit Note              Patient Name: Corinne Gibson   :  1935  MRN:  [id number]    Admit date:  10/4/2020    Admitted from: [facility] Admitting Physician:  Valentine Hurd MD   PCP:  Omega Glass MD      Primary Insurance: Payor: Kristofer Trujillo /  /  /    Secondary Insurance:  none    Emergency Contact:      Contact/Relation:   /         Phone:       Contact/Relation:   /     Phone:     Advance Directive  Advance directives received Yes  Patient has completed an advance directive  and Patient has a documented healthcare surrogate  Discussed with: Family member  DPOA-HC Name-Relation:    Phone:       Terminal Diagnosis Needs clarified - no terminal in hospice order       Current Hospital Problem List:   Patient Active Problem List   Diagnosis Code    Septic shock ([facility] A41.9, R65.21    Hyponatremia E87.1    Diarrhea R19.7    UTI (urinary tract infection) with pyuria N39.0    Anemia D64.9    Leukocytosis D72.829    Acidosis E87.2    Hypotension I95.9    Sick sinus syndrome ([address] I49.5    Cardiac pacemaker in situ Z95.0    C. difficile colitis A04.72    QI (acute kidney injury) ([address] N17.9    Thrombocytopenia ([address] D69.6    Anemia associated with acute blood loss D62       Code Status Order: DNR-CC     Past Medical History:        Diagnosis Date    Anemia     Atrial fibrillation ([address]     Colitis     Hypertension      Past Surgical History:        Procedure Laterality Date    LAPAROTOMY N/A 10/10/2020    SECOND LOOK EXPLORATORY LAPAROTOMY,  BOWEL RESECTION, ILEOSTOMY CREATION,  RE-APPLICATION OF WOUND VAC performed by Arin Baig MD at [address] [place] [address] N/A 10/7/2020    TOTAL COLECTOMY WITH ABTHERA OPEN ABDOMINAL DRESSING performed by Arin Baig MD at [facility] OR       Allergies:  Codeine    Family Goal: [address] held with Daughter - Chip Olvera, Son Patricia Mojica and his wife    The hospice benefit and vac would be discontinued. Met with family at patient's bedside. She was terminally extubated an hour prior to my visit. She was given 10mg of morphine and has had 3 doses of 25mcg of fentanyl. She has some pain with light abdominal palpation expressed by grimacing and changes in respirations. Respirations are irregular and deep without the use of accessory muscles. Pauses of apnea 10-20 seconds noted. Feet are cold to the touch. Family would like to keep patient comfortable in the hospital overnight and evaluate in the morning. They are wanting to use transitions program if patient does not meet inpatient level of care hospice criteria. Updated ICU resident and he is in agreement with plan. Discharge Plan:  Discharge Disposition; undecided  [facility] plan:  1. Follow-up in morning  2. Please call [facility] [phone] with any questions. 3. Patient not currently under the care of hospice.     Electronically signed by Landon Gallegos RN on 10/14/2020 at 3:44 PM

## 2020-10-14 NOTE — PROGRESS NOTES
Patient's daughter, son, and daughter in law are present in hospital. Patient's daughter, Cristina Don, informs RN and physician that all family is present and they are ready to extubate patient. Morphine given for comfort per order and family request. RT arrives to room to extubate patient. Family remains at bedside for extubation. NG tube removed for comfort. Patient suctioned and extubated to 3L NC for comfort. Patient appears comfortable at this time with no dyspnea or anxiety. All patient and family needs met at this time. Will continue to monitor.

## 2020-10-14 NOTE — PROGRESS NOTES
DAILY VENTILATOR WEANING ASSESSMENT PERFORMED    P/FIO2 Ratio =          (<100= do not Wean)                  Cs =   52                       (<32= Instability)  Plat. Pressure = 15  MV =8.97  RSBI =    Instabilities:       Cardiovascular =1,2       CNS =       Respiratory =       Metabolic =    Parameters    no    Wean per protocol  yes    Ask Physician for a weaning plan yes    Additional Comments:     Performed by Jose Salcido RRT      Reference Table:    Cardiovascular     CNS      1. Mean BP less than or equal to 75   1. Neuromuscular blockade  2. Heart Rate greater than 130   2. RASS of -3, -4, -5  3. Myocardial Ischemia    3. RASS of +3, +4  4. Mechanical Assist Device    4. ICP greater than 15 or             Intracranial Hypertension         Respiratory      Metabolic  1. PEEP equal to or greater than 10cm/H20  1. Temp. (8hrs) less than 95 or > 103  2. Respiratory Rate greater than 35   2. WBC < 5000 or > 08308  3. Minute Volume greater than 15L  4. pH less than 7.30  5.  Deteriorating chest X-ray

## 2020-10-14 NOTE — CARE COORDINATION
COVID negative 10/4. Vent/ intubated since 10/7. Minimal response. Poor prognosis per progress notes- made DNR-CC. Wound vac, JULISSA x 2, TPN, iv abxs continued. From [facility] PTA- needs precert to return however they are unable to administer TPN at that facility. [facility] following. Per nursing, plan is to terminally extubate. Jose Manning @ [facility] [employer] updated.    Juan Mtz

## 2020-10-14 NOTE — PROGRESS NOTES
[address] [place] [address] [facility]  TG  Progress Note    SUBJECTIVE:  Chief Complaint   Patient presents with    Hypotension    Diarrhea     The patient is on the ICU. She is still intubated. Minimally responsive. Daughter thinks that she is blinking when she asks her questions. Tolerating current antibiotics. Having issues with hypotension this morning. Review of systems:  As stated above in the chief complaint, otherwise negative. Medications:  Scheduled Meds:   sodium chloride  20 mL Intravenous Once    sodium chloride  20 mL Intravenous Once    pantoprazole  40 mg Intravenous BID    albumin human  25 g Intravenous Q8H    fluconazole  400 mg Intravenous Q24H    metoprolol  2.5 mg Intravenous Q6H    insulin lispro  0-6 Units Subcutaneous Q6H    sodium chloride flush  10 mL Intravenous BID    piperacillin-tazobactam  3.375 g Intravenous Q8H    chlorhexidine  15 mL Mouth/Throat BID     Continuous Infusions:   PN-Adult  3 IN 1 Central Line (Standard) 66.7 mL/hr at 10/13/20 1802    bumetanide 0.1 mg/mL infusion 0.2 mg/hr (10/13/20 1801)    sodium chloride 12.5 mL/hr (10/13/20 2240)     PRN Meds:fentanNYL, [DISCONTINUED] lubrifresh P.M. **AND** artificial tears    OBJECTIVE:  BP (!) 119/57   Pulse 126   Temp 98.2 °F (36.8 °C) (Axillary)   Resp 26   Ht 5' 6\" (1.676 m)   Wt 236 lb 12.4 oz (107.4 kg)   SpO2 99%   BMI 38.22 kg/m²   Temp  Av.5 °F (36.9 °C)  Min: 98.2 °F (36.8 °C)  Max: 99.1 °F (37.3 °C)  Constitutional: The patient is lying in bed in the ICU. She is intubated and unresponsive. Eyes open. Daughter in room. Skin: Warm and dry. No rashes were noted. HEENT: Round pupils. Moist mucous membranes. No ulcerations or thrush. ET tube. OG tube. Neck: Supple to movements. Chest: No respiratory distress. Good breath sounds. No crackles. Left pacer. Cardiovascular: Heart sounds rhythmic and regular. Abdomen: Midline incision with wound VAC.   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 difficile colitis with perforation. Intraoperative cultures growing E. coli, Candida albicans and anaerobic gram-positive rods  · Leukemoid reaction associated to fulminant colitis, resolved  · History of UTI, being treated with Ceftriaxone at the nursing facility  · Acute kidney injury, improving  · Possible ischemic stroke    PLAN:  · Continue Zosyn and Fluconazole  · Supportive care    Discussed with nursing.   Spoke with daughter    Torito Simon  8:31 AM  10/14/2020

## 2020-10-14 NOTE — PROGRESS NOTES
GENERAL SURGERY  DAILY PROGRESS NOTE  10/14/2020        Chief Complaint   Patient presents with    Hypotension    Diarrhea       Subjective:  Still not following commands. Eyes open. Minimal ostomy function. Drains SS. Objective:  BP (!) 94/49   Pulse 132   Temp 98.2 °F (36.8 °C) (Axillary)   Resp 14   Ht 5' 6\" (1.676 m)   Wt 236 lb 12.4 oz (107.4 kg)   SpO2 99%   BMI 38.22 kg/m²      U: 880  JP1: 315 SS  JP2: 30 SS  Wound Vac: 100     GENERAL: Intubated sedated  LUNGS:  No increased work of breathing on ventilator  CARDIOVASCULAR:  Afib RVR, -130s. ABDOMEN:  Soft, incisional wound vac, 2 drains serosanguinous, ileostomy viable with sweat      Assessment/Plan:  82 y.o. female with septic shock nowand c. Diff colitis s/p total colectomy, left in discontinuity with abthera placement 10/7, s/p reexploration and loop ileostomy creation with abdominal wall closure 10/10     - Abx per ID  - TPN  - monitor for ileostomy output  - Appreciated critical care  - Appreciate consultant recommendations  - repeat CT negative     Electronically signed by Francis Tavera MD on 10/14/2020 at 5:21 AM     Attending Physician Statement:    Chief Complaint:   Chief Complaint   Patient presents with    Hypotension    Diarrhea       I have examined the patient and performed the key aspects of physical exam, reviewed the record (including all pertinent and new radiology images and laboratory findings), and discussed the case with the surgical team.  I agree with the assessment and plan with the following additions, corrections, and changes. 14pt review of symptoms completed and negative except as mentioned. Minimal following of commands yesterday briefly. None overnight. Ileostomy viable, no stool output. White count normal.  A. fib RVR. Continue TPN, abx. Transfuse 1 unit today. Continue ICU care. Poor prognosis.     Debra Dunn MD  10/14/20  7:56 AM    NOTE: This report, in part or full, may have been transcribed using voice recognition software. Every effort was made to ensure accuracy; however, inadvertent computerized transcription errors may be present. Please excuse any transcriptional grammatical or spelling errors that may have escaped my editorial review.

## 2020-10-14 NOTE — PROGRESS NOTES
Department of Internal Medicine  Nephrology Attending Progress Note      Reason for Consult:   QI  Requesting Physician:  Dr April Lisa:  weakness    History Obtained From:  patient, family member - daughter    Sub: Patient seen and examined at bedside in the ICU  Events over the last 24 hours reviewed  Hospice is consulted, patient is for terminal extubation today          Past Medical History:        Diagnosis Date    Anemia     Atrial fibrillation ([address]     Colitis     Hypertension        Current Medications:    Current Facility-Administered Medications: 0.9 % sodium chloride bolus, 20 mL, Intravenous, Once  digoxin (LANOXIN) injection 125 mcg, 125 mcg, Intravenous, Daily  phenylephrine (HUGO-SYNEPHRINE) 50 mg in dextrose 5 % 250 mL infusion, 100 mcg/min, Intravenous, Continuous  0.9 % sodium chloride bolus, 20 mL, Intravenous, Once  pantoprazole (PROTONIX) injection 40 mg, 40 mg, Intravenous, BID  PN-Adult  3-in-1 Central Line (Standard), , Intravenous, Continuous TPN  bumetanide (BUMEX) 12.5 mg in sodium chloride 0.9 % 125 mL infusion, 0.2 mg/hr, Intravenous, Continuous  albumin human 25 % IV solution 25 g, 25 g, Intravenous, Q8H  fluconazole (DIFLUCAN) in 0.9 % sodium chloride IVPB 400 mg, 400 mg, Intravenous, Q24H  metoprolol (LOPRESSOR) injection 2.5 mg, 2.5 mg, Intravenous, Q6H  fentaNYL (SUBLIMAZE) injection 25 mcg, 25 mcg, Intravenous, Q4H PRN  insulin lispro (HUMALOG) injection vial 0-6 Units, 0-6 Units, Subcutaneous, Q6H  sodium chloride flush 0.9 % injection 10 mL, 10 mL, Intravenous, BID  piperacillin-tazobactam (ZOSYN) 3.375 g in dextrose 5 % 50 mL IVPB extended infusion (mini-bag), 3.375 g, Intravenous, Q8H  0.9 % sodium chloride infusion, , Intravenous, Q8H  [DISCONTINUED] lubrifresh P.M. (artificial tears) ophthalmic ointment 0.5 inch, 1 applicator, Both Eyes, PRN **AND** lubrifresh P.M. (artificial tears) ophthalmic ointment, , Both Eyes, PRN  chlorhexidine (PERIDEX) 0.12 % solution 15 mL, 15 mL, Mouth/Throat, BID  Allergies:  Codeine    Social History:    TOBACCO:  Never used tobacco  ETOH:  Never drank alcohol  DRUGS:  Never used recreational drugs    Family History:   History reviewed. No pertinent family history.     PHYSICAL EXAM:      Vitals:    VITALS:  BP (!) 119/57   Pulse 147   Temp 98.2 °F (36.8 °C) (Axillary)   Resp (!) 32   Ht 5' 6\" (1.676 m)   Wt 236 lb 12.4 oz (107.4 kg)   SpO2 99%   BMI 38.22 kg/m²   24HR BLOOD PRESSURE RANGE:  Systolic (38RMT), DEN:463 , Min:94 , HHB:605   ; Diastolic (92UGS), IKO:93, Min:42, Max:58    24HR INTAKE/OUTPUT:      Intake/Output Summary (Last 24 hours) at 10/14/2020 0944  Last data filed at 10/14/2020 0928  Gross per 24 hour   Intake 3370 ml   Output 3925 ml   Net -555 ml       Constitutional:  Well developed and nourished , no acute distress, intubated and sedated, seen in the ICU, Fio2 at 40 %  HEENT:  NC, PERRL, oral mucosa dry , neck supple, no JVD  Respiratory:  Clear bilaterally except decreased BS at bases  Cardiovascular/Edema: RRR, s1,s2 no gallop  Gastrointestinal:  Wound vac in place, caraballo in place draining concentrated urine  Neurologic:  Alert and Ox 3 , nonfocal  Skin:  Decreased turgor  Other:  No rash    DATA:    CBC:   Lab Results   Component Value Date    WBC 6.9 10/14/2020    RBC 2.34 10/14/2020    HGB 6.9 10/14/2020    HCT 22.1 10/14/2020    MCV 94.4 10/14/2020    MCH 29.5 10/14/2020    MCHC 31.2 10/14/2020    RDW 18.5 10/14/2020    PLT 28 10/14/2020    MPV 12.3 10/14/2020     CMP:    Lab Results   Component Value Date     10/14/2020    K 4.4 10/14/2020    K 3.8 10/09/2020     10/14/2020    CO2 31 10/14/2020    BUN 32 10/14/2020    CREATININE 0.7 10/14/2020    GFRAA >60 10/14/2020    LABGLOM >60 10/14/2020    GLUCOSE 127 10/14/2020    PROT 5.2 10/14/2020    LABALBU 3.2 10/14/2020    CALCIUM 8.3 10/14/2020    BILITOT 2.8 10/14/2020    ALKPHOS 284 10/14/2020    AST 57 10/14/2020    ALT 18 10/14/2020 Magnesium:    Lab Results   Component Value Date    MG 2.0 10/14/2020     Phosphorus:    Lab Results   Component Value Date    PHOS 2.5 10/14/2020     Uric Acid:  No results found for: Kt Ge  U/A:    Lab Results   Component Value Date    COLORU CASEY 10/04/2020    PROTEINU TRACE 10/04/2020    PHUR 5.0 10/04/2020    WBCUA >20 10/04/2020    RBCUA NONE 10/04/2020    BACTERIA FEW 10/04/2020    CLARITYU TURBID 10/04/2020    SPECGRAV 1.025 10/04/2020    LEUKOCYTESUR MODERATE 10/04/2020    UROBILINOGEN 0.2 10/04/2020    BILIRUBINUR MODERATE 10/04/2020    BLOODU Negative 10/04/2020    GLUCOSEU Negative 10/04/2020     Radiology Review:    Atelectasis and pleural effusion in the lung bases more on the left side.         Right IJ central line has noted without complications.           Problems resolved:       IMPRESSION/RECOMMENDATIONS:      Yon Reddy is a 82 y.o. female with significant past medical history of Colitis, diarrhea, blood in stool, A-Fib, anemia, and HTN admitted via ED for hypotension and diarrhea. Pt with SBP in 80's in ED, she was treated with IV fluids. Pt has been having diarrhea for > 3 months stating stools are dark with red blood associated nausea, vomiting, and poor oral intake. Daughter reports that patient was discharged from [facility] and was living with her. Patient got progressively worse therefore she brought her to ED. Initial labs done showed serum sodium 126 and creatinine of 1.3 mg/dl yesterday which worsened to 122 and 1.6 mg/dl respectively therefore this consultation was ordered. 1. QI secondary to volume responsive prerenal state versus ischemic ATN    2. Hyponatremia probably secondary to GI losses  3. C. Difficile colitis, with evidence of fulminant colitis with multiple perforations, s/p total colectomy  4. High anion gap metabolic acidosis secondary to lactic acidosis with severe hypotension, hypoperfusion, PH>7.2  5.  Anemia : on Ferrous sulfate,   6. Severe hypoalbuminemia/malnutrition  7. Hypocalcemia: Corrected calcium for hypoalbuminemia is 7.7mg/dL  8.  Nutrition:NPO      PLAN:    · QI, oliguric secondary to severe ischemic ATN from profound hypotension, UO is adequate on IV Bumex drip at 0.2 mg/hr  · Hospice is consulted and plan for terminal extubation noted, okay to stop IV Bumex drip  · We will sign off now      Linda Blount MD

## 2020-10-14 NOTE — PROGRESS NOTES
Critical Care Team - Daily Progress Note      Date and time: 10/14/2020 12:58 PM  Patient's name:  Sanjay Patel  Medical Record Number: [id number]  Patient's account/billing number: [de-identified]  Patient's YOB: 1935  Age: 82 y.o. Date of Admission: 10/4/2020  4:26 PM  Length of stay during current admission: 10      Primary Care Physician: Mariah Mattson MD  ICU Attending Physician: Dr. Vinny Nicole    Code Status: [facility]    Reason for ICU admission:   Septic shock due to c.diff colitis  Post-operative respiratory failure requiring mechanical ventilation      SUBJECTIVE:   The patient is a 82 y. o. female with significant past medical history of A-fib, HTN, and anemia that presented to the ER due to hypotension and diarrhea. Patient was recently diagnosed with UTI and was placed on antibiotics. Patient has been having diarrhea for a few weeks. Patient has been having diffuse cramping abdominal pain with no radiation. Patient was found to be guaiac positive in the ER. Patient was transferred out of the ICU on 10/06. On 10/07, patient had an increase in abdominal pain and peritonitis, so she was brought to the OR. Patient underwent a total colectomy with abthera open abdominal dressing. Patient was re-admitted to the ICU due to need for mechanical ventilation.     OVERNIGHT EVENTS:        10/13/2020  No acute events overnight. NG suction tube materials black in color. Left JULISSA drain with continuing significant output.   Patient back in A. fib RVR  AWAKE & FOLLOWING COMMANDS:  [x] No   [] Yes    CURRENT VENTILATION STATUS:     [x] Ventilator  [] BIPAP  [] Nasal Cannula [] Room Air      IF INTUBATED, ET TUBE MARKING AT LOWER LIP:       cms    SECRETIONS Amount:  [] Small [] Moderate  [] Large  [x] None  Color:     [] White [] Colored  [] Bloody    SEDATION:  RAAS Score: -4  [] Propofol gtt  [] Versed gtt  [] Ativan gtt   [] No Sedation    PARALYZED:  [x] No    [] Yes    DIARRHEA:                [x] No                [] Yes  (C. Difficile status: [] positive                                                                                                                       [] negative                                                                                                                     [] pending)    VASOPRESSORS:  [x] No    [] Yes    If yes -   [] Levophed       [] Dopamine     [] Vasopressin       [] Dobutamine  [] Phenylephrine         [] Epinephrine    CENTRAL LINES:     [] No   [x] Yes   (Date of Insertion: 10/04)           If yes -     [] Right IJ     [x] Left IJ [] Right Femoral [] Left Femoral                   [] Right Subclavian [] Left Subclavian       WEST'S CATHETER:   [] No   [x] Yes  (Date of Insertion:10/04)     URINE OUTPUT:            [] Good   [x] Low              [] Anuric      OBJECTIVE:     VITAL SIGNS:  BP (!) 119/57   Pulse 131   Temp 98.2 °F (36.8 °C) (Axillary)   Resp 15   Ht 5' 6\" (1.676 m)   Wt 236 lb 12.4 oz (107.4 kg)   SpO2 99%   BMI 38.22 kg/m²   Tmax over 24 hours:  Temp (24hrs), Av.4 °F (36.9 °C), Min:98.2 °F (36.8 °C), Max:99.1 °F (37.3 °C)      Patient Vitals for the past 6 hrs:   Pulse Resp SpO2   10/14/20 1235 131 15 99 %   10/14/20 0829 147 (!) 32 99 %         Intake/Output Summary (Last 24 hours) at 10/14/2020 1258  Last data filed at 10/14/2020 1126  Gross per 24 hour   Intake 3370 ml   Output 3485 ml   Net -115 ml     Wt Readings from Last 2 Encounters:   10/14/20 236 lb 12.4 oz (107.4 kg)     Body mass index is 38.22 kg/m². PHYSICAL EXAMINATION:  BP (!) 119/57   Pulse 131   Temp 98.2 °F (36.8 °C) (Axillary)   Resp 15   Ht 5' 6\" (1.676 m)   Wt 236 lb 12.4 oz (107.4 kg)   SpO2 99%   BMI 38.22 kg/m²   Physical Exam  Vitals signs reviewed. Constitutional:       Appearance: She is ill-appearing. Comments: Intubated; grimaces to pain; patient is blinking.   Does not obey commands   HENT:      Mouth/Throat:      Mouth: Mucous membranes are dry. Eyes:      Comments: Pinpoint pupils, sluggish, right sided gaze   Cardiovascular:      Rate and Rhythm: Regular rhythm. Tachycardia present. Heart sounds: No murmur. Pulmonary:      Effort: Pulmonary effort is normal.      Breath sounds: No wheezing or rhonchi (diffuse). Abdominal:      Palpations: Abdomen is soft. Comments: Wound vac in place, ostomy draining brown fluid, incisions CDI; JULISSA drains draining serosanguinous fluid   Musculoskeletal:      Right lower leg: 3+ Pitting Edema present. Left lower leg: 3+ Pitting Edema present. Skin:     General: Skin is warm. Coloration: Skin is pale.       Comments: Improvement in bluing of fingers on left hand   Neurological:      Comments: Intubated; minimal response to pain         Any additional physical findings:    MEDICATIONS:    Scheduled Meds:   sodium chloride  20 mL Intravenous Once    digoxin  125 mcg Intravenous Daily    fentaNYL        sodium chloride  20 mL Intravenous Once    pantoprazole  40 mg Intravenous BID    albumin human  25 g Intravenous Q8H    fluconazole  400 mg Intravenous Q24H    metoprolol  2.5 mg Intravenous Q6H    insulin lispro  0-6 Units Subcutaneous Q6H    sodium chloride flush  10 mL Intravenous BID    piperacillin-tazobactam  3.375 g Intravenous Q8H    chlorhexidine  15 mL Mouth/Throat BID     Continuous Infusions:   phenylephrine (HUGO-SYNEPHRINE) 50mg/250mL infusion      PN-Adult  3 IN 1 Central Line (Standard) 66.7 mL/hr at 10/13/20 1802    bumetanide 0.1 mg/mL infusion 0.2 mg/hr (10/13/20 1801)    sodium chloride 12.5 mL/hr at 10/14/20 0920     PRN Meds:   loperamide, 2 mg, PRN  morphine, 2 mg, Q2H PRN    Or  morphine, 4 mg, Q2H PRN  glycopyrrolate, 0.2 mg, Q4H PRN  ondansetron, 4 mg, Q6H PRN  LORazepam, 1 mg, Q2H PRN  fentanNYL, 25 mcg, Q4H PRN  artificial tears, , PRN          VENT SETTINGS (Comprehensive) (if applicable):  Vent Information  $Ventilation: $Subsequent Day  Equipment ID: [id number]  Equipment Changed: Suction catheter  Vent Type: [id number]  Vent Mode: PS  Vt Ordered: 400 mL  Rate Set: 0 bmp  Peak Flow: 60 L/min  Pressure Support: 15 cmH20  FiO2 : 35 %  SpO2: 99 %  SpO2/FiO2 ratio: 282.86  Sensitivity: 3  PEEP/CPAP: 5  I Time/ I Time %: 0 s  Humidification Source: HME  Additional Respiratory  Assessments  Pulse: 131  Resp: 15  SpO2: 99 %  Position: Semi-Forbes's  Humidification Source: HME  Oral Care: Mouth suctioned, Mouth moisturizer, Mouth swabbed  Subglottic Suction Done?: No  Cuff Pressure (cm H2O): 29 cm H2O    ABGs:     Recent Labs     10/13/20  0550 10/14/20  0648   PH 7.464*  --    PCO2 38.1  --    PO2 146.3*  --    HCO3 26.7*  --    BE 2.8 2.6*   O2SAT 99.1* 99.6*         Laboratory findings:    Complete Blood Count:   Recent Labs     10/12/20  0545 10/13/20  0515 10/13/20  1630 10/14/20  0625   WBC 17.7* 11.4  --  6.9   HGB 7.6* 6.0* 7.3* 6.9*   HCT 24.0* 19.5* 23.4* 22.1*   PLT 29* 29*  --  28*        Last 3 Blood Glucose:   Recent Labs     10/12/20  0545 10/13/20  0515 10/14/20  0625   GLUCOSE 131* 137* 127*        PT/INR:    Lab Results   Component Value Date    PROTIME 33.4 10/14/2020    INR 2.8 10/14/2020     PTT:    Lab Results   Component Value Date    APTT 68.6 10/14/2020       Comprehensive Metabolic Profile:   Recent Labs     10/12/20  0545 10/12/20  1115 10/13/20  0515 10/14/20  0625     --  140 140   K 5.7* 5.3* 5.2* 4.4   *  --  107 103   CO2 28  --  30* 31*   BUN 29*  --  27* 32*   CREATININE 0.5  --  0.6 0.7   GLUCOSE 131*  --  137* 127*   CALCIUM 7.5*  --  8.0* 8.3*   PROT 4.2*  --  5.0* 5.2*   LABALBU 1.8*  --  2.9* 3.2*   BILITOT 1.0  --  1.7* 2.8*   ALKPHOS 280*  --  249* 284*   AST 49*  --  53* 57*   ALT 17  --  17 18      Magnesium:   Lab Results   Component Value Date    MG 2.0 10/14/2020     Phosphorus:   Lab Results   Component Value Date    PHOS 2.5 10/14/2020     Ionized Calcium:   Lab Results   Component Value Date    CAION 1.16 10/14/2020        Urinalysis:     Troponin: No results for input(s): TROPONINI in the last 72 hours. Microbiology:    Cultures during this admission:     Blood cultures:                 [x] None drawn      [] Negative             []  Positive (Details:  )  Urine Culture:                   [x] None drawn      [] Negative             []  Positive (Details:  )  Sputum Culture:               [x] None drawn       [] Negative             []  Positive (Details:  )   Endotracheal aspirate:     [x] None drawn       [] Negative             []  Positive (Details:  )     Other pertinent Labs:       Radiology/Imaging:   Xr Abdomen (kub) (single Ap View)    Result Date: 10/7/2020  EXAMINATION: ONE SUPINE XRAY VIEW(S) OF THE ABDOMEN 10/7/2020 10:01 am COMPARISON: Abdomen radiograph 10/06/2020 and abdomen CT 10/04/2020 HISTORY: ORDERING SYSTEM PROVIDED HISTORY: abd pain 10/10 TECHNOLOGIST PROVIDED HISTORY: Reason for exam:->abd pain 10/10 FINDINGS: Again noted is gas-filled distention of the transverse colon without significant change from yesterday x-ray. Mural thumbprinting sign is again present in the transverse, descending, and sigmoid colon region. Slight prominence of small bowel gas without distention. No radiographic evidence of mass or urologically significant calcification. Left lung base remains opacified with consolidation, non-specific. Transverse, descending, and sigmoid colon mural thickening may reflect edema and/or inflammation/infection, without significant interval change. Slight prominence of gas throughout the small intestine, without gross distension, suggestive of paralytic ileus. Persisting left lung base opacity may be atelectasis with pleural effusion.  Differential again includes infectious infiltrate     Xr Abdomen (kub) (single Ap View)    Result Date: 10/6/2020  EXAMINATION: ONE SUPINE XRAY VIEW(S) OF THE ABDOMEN 10/6/2020 11:11 am COMPARISON: CT abdomen pelvis October 4, 2020 HISTORY: ORDERING SYSTEM PROVIDED HISTORY: white count TECHNOLOGIST PROVIDED HISTORY: Reason for exam:->white count FINDINGS: The stomach is not dilated. There is a prominent loop of transverse colon with thumbprinting sign. The small bowels are normal caliber. No abnormal intra-abdominal calcifications. Thickening of the transverse colon wall. Consistent with inflammation identified on prior CT. Ct Head Wo Contrast    Result Date: 10/11/2020  EXAMINATION: CT OF THE HEAD WITHOUT CONTRAST  10/11/2020 11:20 am TECHNIQUE: CT of the head was performed without the administration of intravenous contrast. Dose modulation, iterative reconstruction, and/or weight based adjustment of the mA/kV was utilized to reduce the radiation dose to as low as reasonably achievable. COMPARISON: None HISTORY: ORDERING SYSTEM PROVIDED HISTORY: change in mental status TECHNOLOGIST PROVIDED HISTORY: Reason for exam:->change in mental status Has a \"code stroke\" or \"stroke alert\" been called? ->No Initial exam FINDINGS: BRAIN/VENTRICLES: Focal hypodensity within the left frontal lobe deep white matter extending into the anterior margin of the centrum semiovale. This is best identified image #42. No mass effect or midline shift. No intracranial or extracranial hemorrhage. The ventricles are intact without acute hydrocephalus. The basilar cisterns are patent. ORBITS: The visualized portion of the orbits demonstrate no acute abnormality. SINUSES: The visualized paranasal sinuses and mastoid air cells demonstrate no acute abnormality. SOFT TISSUES/SKULL:  No acute abnormality of the visualized skull or soft tissues. Focal hypodensity within the left frontal lobe white matter. Correlate MRI imaging as subacute infarct cannot be excluded.      Ct Abdomen Pelvis W Iv Contrast Additional Contrast? None    Result Date: 10/4/2020  EXAMINATION: CT OF THE ABDOMEN AND PELVIS WITH CONTRAST 10/4/2020 7:53 pm TECHNIQUE: CT of the abdomen and pelvis was performed with the administration of intravenous contrast. Multiplanar reformatted images are provided for review. Dose modulation, iterative reconstruction, and/or weight based adjustment of the mA/kV was utilized to reduce the radiation dose to as low as reasonably achievable. COMPARISON: None. HISTORY: ORDERING SYSTEM PROVIDED HISTORY: abd pain, hypotension, gi bleed TECHNOLOGIST PROVIDED HISTORY: Reason for exam:->abd pain, hypotension, gi bleed Additional Contrast?->None FINDINGS: The lung bases demonstrate cardiomegaly with a tiny pericardial effusion. There is minimal atelectasis and pleural effusion in the lung bases left more than right. The liver is fatty infiltrated. The gallbladder is distended without acute inflammation. Consider ultrasonography for better assessment of gallbladder. Spleen, pancreas, the adrenals and the kidneys are normal except for a 1.5 cm cystic lesion in the right kidney. There is degenerative changes in the lumbar spine. .  The urinary bladder is contracted with a Martin catheter and wall thickening. There is diffuse thickening and inflammation of the transverse colon, descending and rectosigmoid colon with hyperdensity concerning for active bleeding/contrast extravasation. There is inflammatory changes in the presacral area. A tiny air bubble is identified in the rectovesical pouch which could represent waled off microperforation. Appendix cannot be identified. There is some inflammatory changes in the gluteus region     Diffuse inflammation of the transverse colon, descending and rectosigmoid colon with wall thickening and edema with active contrast extravasation/bleeding. Hemorrhagic diverticulitis or colitis are considered. There is some edema in the presacral area and tiny amount of air collection in the rectovesical pouch. Walled-off microperforation is considered. Distended gallbladder. Atelectasis/pleural effusion in the left lung base.  RECOMMENDATIONS: The clinical service was notified     Ct Abdomen Pelvis W Iv Contrast Additional Contrast? None    Addendum Date: 10/4/2020    ADDENDUM: In the title of the examination, disregard the CT abdomen and pelvis. Result Date: 10/4/2020  EXAMINATION: ONE XRAY VIEW OF THE CHEST; CT OF THE ABDOMEN AND PELVIS WITH CONTRAST 10/4/2020 7:06 pm COMPARISON: October 4, 2020 HISTORY: ORDERING SYSTEM PROVIDED HISTORY: post R IJ CVC placement TECHNOLOGIST PROVIDED HISTORY: Reason for exam:->post R IJ CVC placement     There is a borderline cardiac size. There is atelectasis in the lung bases with a small left pleural effusion. Left subclavian pacemaker is unchanged. Right IJ central line is noted with the tip at the atrial caval junction. There is no pneumothorax. RECOMMENDATION: Atelectasis and pleural effusion in the lung bases more on the left side. Right IJ central line has noted without complications. Us Gallbladder Ruq    Result Date: 10/6/2020  EXAMINATION: RIGHT UPPER QUADRANT ULTRASOUND 10/5/2020 10:46 pm COMPARISON: CT abdomen pelvis 10/04/2020 HISTORY: ORDERING SYSTEM PROVIDED HISTORY: Elevated alk phos, LUQ pain TECHNOLOGIST PROVIDED HISTORY: Reason for exam:->Elevated alk phos, LUQ pain What reading provider will be dictating this exam?->CRC FINDINGS: LIVER:  Normal liver size and contour. Increased parenchymal echogenicity. No focal lesion. BILIARY SYSTEM:  No intra or extrahepatic bile duct dilatation. Common bile duct measures 5 mm. Gallbladder contains shadowing dependent gallstones. No wall thickening or pericholecystic fluid. Reported positive sonographic Hopson's sign. RIGHT KIDNEY: Right kidney measures 10.1 x 4.5 x 5.2 cm. Anechoic cyst at the superior pole measures up to 15 mm. An echogenic mid polar region focus measures 9 x 9 x 9 mm and correlates with hypodensity on CT, probably a tiny angiomyolipoma. PANCREAS:  Visualized portions of the pancreas are unremarkable.  OTHER: Right pleural effusions suspected. No ascites. Cholelithiasis with positive sonographic Hopson's sign but no wall thickening or pericholecystic inflammatory changes, altogether equivocal for cholecystitis. Consider functional analysis with nuclear medicine HIDA scan. Hepatic steatosis. 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 Suspected small right pleural effusion. Xr Chest Portable    Result Date: 10/11/2020  EXAMINATION: ONE XRAY VIEW OF THE CHEST 10/11/2020 6:31 am COMPARISON: Yesterday HISTORY: ORDERING SYSTEM PROVIDED HISTORY: resp failure TECHNOLOGIST PROVIDED HISTORY: Reason for exam:->resp failure FINDINGS: Patient is rotated. Left infraclavicular cardiac pacemaker with 2 lead wires is noted. The endotracheal tube terminates 5 cm cephalad to the bernadette. Nasogastric tube extends below the hemidiaphragm. Right jugular central line terminates over the distal aspect of the superior vena cava. Cardiac silhouette is normal.  There hazy opacities in lungs. Aeration of right base is improved versus more dependently layered effusion. There is no pneumothorax. Supportive devices are in stable positions. Bibasilar effusions and airspace disease/atelectasis. Improved aeration of the right base in the interval.     Xr Chest Portable    Result Date: 10/10/2020  EXAMINATION: ONE XRAY VIEW OF THE CHEST 10/10/2020 7:01 am COMPARISON: 10/09/2020 HISTORY: ORDERING SYSTEM PROVIDED HISTORY: resp failure TECHNOLOGIST PROVIDED HISTORY: Reason for exam:->resp failure FINDINGS: The endotracheal tube tip is 4.8 cm above the bernadette. Nasogastric tube, right central line and pacemaker are unchanged. Is there is new airspace disease in the right lower lobe which could be atelectasis versus infiltrate. Is there are bilateral small pleural effusions which is stable on the left and increased on the right. Increased right lower lobe infiltrate versus atelectasis and small pleural effusion on the right.  Unchanged left basilar airspace disease and small pleural effusion. Xr Chest Portable    Result Date: 10/9/2020  EXAMINATION: ONE XRAY VIEW OF THE CHEST 10/9/2020 8:59 am COMPARISON: October 9, 2020 HISTORY: ORDERING SYSTEM PROVIDED HISTORY: s/p ET advancement TECHNOLOGIST PROVIDED HISTORY: Reason for exam:->s/p ET advancement Follow-up FINDINGS: Tip of the endotracheal tube terminates 3.3 cm above the bernadette. The NG tube tip is not identified. The right IJ central line catheter is stable. Low lung volumes with right infrahilar atelectasis. No pulmonary edema or pneumothorax. Stable cardiomediastinal silhouette. ET tube advancement. Right lower lobe atelectasis. Xr Chest Portable    Result Date: 10/9/2020  EXAMINATION: ONE XRAY VIEW OF THE CHEST 10/9/2020 7:02 am COMPARISON: 10/07/2020 HISTORY: ORDERING SYSTEM PROVIDED HISTORY: intubated TECHNOLOGIST PROVIDED HISTORY: Reason for exam:->intubated FINDINGS: Lines/tubes are appropriate. Moderate left effusion and left lower lobe infiltrate are unchanged. There is a tiny right effusion. Heart size is normal     No interval change     Xr Chest Portable    Result Date: 10/8/2020  EXAMINATION: ONE XRAY VIEW OF THE CHEST 10/8/2020 6:47 am COMPARISON: 10/07/2020 HISTORY: ORDERING SYSTEM PROVIDED HISTORY: intubated TECHNOLOGIST PROVIDED HISTORY: Reason for exam:->intubated FINDINGS: There is stable position of the support lines and tubes. There is minimal atelectasis seen within the right lung base unchanged when compared to prior study. There is a small left pleural effusion and left lung base atelectasis. Left upper lobe is clear. These findings are also unchanged. 1. No interval change in the small left pleural effusion and left lung base atelectasis. 2. No change in the minimal right lung base atelectasis.      Xr Chest Portable    Result Date: 10/7/2020  EXAMINATION: ONE XRAY VIEW OF THE CHEST 10/7/2020 8:17 pm COMPARISON: October 4, 2020 HISTORY: ORDERING likely small left pleural effusion. No airspace consolidation. Suspect left pleural effusion. No airspace consolidation. Dual-chamber pacemaker in place. Follow-up PA and lateral radiographs may be helpful in further evaluation. Us Dup Upper Extremity Left Venous    Result Date: 10/9/2020  EXAMINATION: VENOUS ULTRASOUND OF THE LEFT UPPER EXTREMITY 10/9/2020 10:12 am COMPARISON: None. HISTORY: ORDERING SYSTEM PROVIDED HISTORY: Left arm, r/o DVT TECHNOLOGIST PROVIDED HISTORY: Reason for exam:->Left arm, r/o DVT What reading provider will be dictating this exam?->CRC Initial exam FINDINGS: Images demonstrate occlusive thrombus within the left proximal to mid brachial vein. The remainder of the deep venous system within the left upper extremity is patent throughout. Occlusive thrombus left mid to proximal brachial vein. The findings were sent to the Radiology Results [address] at 11:31 am on 10/9/2020to be communicated to a licensed caregiver. Xr Chest Abdomen Ng Placement    Result Date: 10/7/2020  EXAMINATION: ONE SUPINE XRAY VIEW(S) OF THE ABDOMEN 10/7/2020 2:07 pm COMPARISON: None. HISTORY: ORDERING SYSTEM PROVIDED HISTORY: NGT Placement TECHNOLOGIST PROVIDED HISTORY: Reason for exam:->NGT Placement What reading provider will be dictating this exam?->CRC FINDINGS: Nasogastric tube courses below the level of the diaphragm with distal tip in the expected location of the stomach. Satisfactory position of nasogastric tube. Us Dup Lower Extremities Bilateral Venous    Result Date: 10/9/2020  EXAMINATION: DUPLEX VENOUS ULTRASOUND OF THE BILATERAL LOWER EXTREMITIES, 10/9/2020 10:12 am TECHNIQUE: Duplex ultrasound and Doppler images were obtained of the bilateral lower extremities COMPARISON: None.  HISTORY: ORDERING SYSTEM PROVIDED HISTORY: r/o DVT TECHNOLOGIST PROVIDED HISTORY: Reason for exam:->r/o DVT What reading provider will be dictating this exam?->CRC FINDINGS: The visualized veins of the bilateral lower extremities are patent and free of echogenic thrombus. The veins are normally compressible and have normal phasic flow. Examination is limited. Due to edema calf veins were not visualized. No evidence of DVT in either lower extremity. Chest Xray (10/14/2020): Moderate sized bilateral pleural effusions are noted. Nancy Chavez are findings of    mild vascular congestion.  There is no right or left upper lobe infiltrate. ASSESSMENT:     Neuro   Intubated      -Grimaces to pain, more awake today            -On Fentanyl PRN for agitation            -RASS -2      -CT of head showed focal hypodensity within left frontal lobe white matter      -Repeat CT of head today        Respiratory   Mechanical ventilation            -Intubated in OR 10/07-day 8      -Pressure trials daily       -Get weaning parameters today            -Wean as tolerated             -CXR daily              -ABG 7.427/41.3/180.8/27.3              Cardiovascular   A-fib with RVR   Hx of A-fib, on Sotalol and Metoprolol at home            -on Metoprolol 2.5 mg every 6 hours            -Cardiology following     Septic Shock  Likely due to fulminant colitis    Resolved               -Off pressors     Fluid Overload   -Urine output improved   -On Bumex drip   -On Albumin 25 g IV every 8 hours     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    Fulminant Colitis  S/p total colectomy on 10/07, s/p osteomy/closure 10/10             -General surgery following       -Wound vac and JULISSA drains draining well      Protonix BID PPx      Renal   QI   Resolved              -Cre 0.7 today                  Hypophosphatemia            -Phos 2.5           -Replete as needed     -TPN rate decreased to prevent refeeding syndrome      Hypomagnesemia    Resolved           -Mag 2.0           -Keep magnesium > 2    Hyperkalemia  Likely due to supplementation     -Potassium 4.4     -TPN rate decreased to prevent refeeding syndrome                 Infectious Disease   Septic Shock   Likely due to c,diff colitis with toxic megacolon   Acute peritonitis secondary to ruptured viscous   Improving              -WBC improving 6.9              -On Zosyn day 7, Diflucan day 4        -Flagyl stopped by ID              -Body fluid culture grew candida albicans and E.coli     Fulminant Colitis   Hx of Ulcerative colitis  S/p total colectomy on 10/07              -C-diff positive               -On Zosyn day 7, Diflucan day 4          Hematology/Oncology   Anemia  Likely multi-factorial- GI bleed, anemia of chronic disease              -Hgb 6. 9              -Transfuse < 7               -CBC daily      Thrombocytopenia/BOY   Likely due to septic shock              -Platelets 28 today              -Patient received 8 units of platelets this admission              -Stop Argatroban due to low hemoglobin     Occlusive thrombus left brachial vein             -Stop Argatroban due to low hemoglobin        Endocrine   Elevated blood glucose   -LDSSI   -Keep blood glucose < 180     Social/Spiritual/DNR/Other   DNR-CC  Continue TPN    Extensively spoke with family today including patient's POA and daughter Cy Delloyd. Family wants to make the patient comfortable and would like the patient extubated at this time.       Lines:  CVC [facility] 10/12/2020- day 3  Martin 10/04/2020- day 9  Midline Right- day 11  Left radial Art- day 7     Consults:  Infectious disease  Cardiology  GI  General surgery  Nephrology       PLAN:     WEAN PER PROTOCOL:  [] No   [x] Yes  [] N/A    DISCONTINUE ANY LABS:   [x] No   [] Yes    ICU PROPHYLAXIS:  Stress ulcer:  [x] PPI Agent  [] [id number] [] Sucralfate  [] Other:  VTE:   [] Argatroban   [] Unfract.  Heparin Subcut  [] EPC Cuffs    NUTRITION:  [] NPO [] Tube Feeding (Specify: ) [x] TPN  [] PO (Diet: PN-Adult  3-in-1 Central Line (Standard))    HOME MEDICATIONS RECONCILED: [x] No  [] Yes    INSULIN DRIP:   [x] No   [] Yes    CONSULTATION NEEDED:  [x] No   [] Yes    FAMILY UPDATED:    [] No   [x] Yes    TRANSFER OUT OF ICU:   [x] No   [] Yes    ADDITIONAL PLAN:    Tiajuana Hatchet, DO, PGY-1          10/14/2020, 12:58 PM     I personally saw, examined and provided care for the patient. Radiographs, labs and medication list were reviewed by me independently. I spoke with bedside nursing, therapists and consultants. Critical care services and times documented are independent of procedures and multidisciplinary rounds with Residents. Additionally comprehensive, multidisciplinary rounds were conducted with the MICU team. The case was discussed in detail and plans for care were established. Review of Residents documentation was conducted and revisions were made as appropriate. I agree with the above documented exam, problem list and plan of care.   Benjamin Galloway MD   CCT excluding procedures: 45'

## 2020-10-15 ENCOUNTER — APPOINTMENT (OUTPATIENT)
Dept: GENERAL RADIOLOGY | Age: 85
DRG: 853 | End: 2020-10-15
Payer: MEDICARE

## 2020-10-15 VITALS
WEIGHT: 236.77 LBS | SYSTOLIC BLOOD PRESSURE: 132 MMHG | DIASTOLIC BLOOD PRESSURE: 71 MMHG | HEIGHT: 66 IN | TEMPERATURE: 98.1 F | OXYGEN SATURATION: 95 % | HEART RATE: 129 BPM | BODY MASS INDEX: 38.05 KG/M2 | RESPIRATION RATE: 22 BRPM

## 2020-10-15 LAB
CULTURE CATHETER TIP: NORMAL
METER GLUCOSE: 79 MG/DL (ref 74–99)

## 2020-10-15 PROCEDURE — 99239 HOSP IP/OBS DSCHRG MGMT >30: CPT | Performed by: INTERNAL MEDICINE

## 2020-10-15 PROCEDURE — 2700000000 HC OXYGEN THERAPY PER DAY

## 2020-10-15 PROCEDURE — 2500000003 HC RX 250 WO HCPCS: Performed by: INTERNAL MEDICINE

## 2020-10-15 PROCEDURE — 6360000002 HC RX W HCPCS: Performed by: INTERNAL MEDICINE

## 2020-10-15 PROCEDURE — 82962 GLUCOSE BLOOD TEST: CPT

## 2020-10-15 RX ADMIN — MORPHINE SULFATE 2 MG: 2 INJECTION, SOLUTION INTRAMUSCULAR; INTRAVENOUS at 09:46

## 2020-10-15 RX ADMIN — MORPHINE SULFATE 2 MG: 2 INJECTION, SOLUTION INTRAMUSCULAR; INTRAVENOUS at 15:20

## 2020-10-15 RX ADMIN — GLYCOPYRROLATE 0.2 MG: 0.2 INJECTION INTRAMUSCULAR; INTRAVENOUS at 09:45

## 2020-10-15 RX ADMIN — MORPHINE SULFATE 2 MG: 2 INJECTION, SOLUTION INTRAMUSCULAR; INTRAVENOUS at 02:10

## 2020-10-15 RX ADMIN — GLYCOPYRROLATE 0.2 MG: 0.2 INJECTION INTRAMUSCULAR; INTRAVENOUS at 15:17

## 2020-10-15 ASSESSMENT — PAIN SCALES - GENERAL
PAINLEVEL_OUTOF10: 0
PAINLEVEL_OUTOF10: 5
PAINLEVEL_OUTOF10: 5

## 2020-10-15 NOTE — PLAN OF CARE
Problem: Skin Integrity:  Goal: Will show no infection signs and symptoms  Description: Will show no infection signs and symptoms  Outcome: Met This Shift     Problem: Falls - Risk of:  Goal: Will remain free from falls  Description: Will remain free from falls  Outcome: Met This Shift     Problem: Pain:  Goal: Pain level will decrease  Description: Pain level will decrease  Outcome: Met This Shift     Problem: Airway Clearance - Ineffective:  Goal: Ability to maintain a clear airway will improve  Description: Ability to maintain a clear airway will improve  Outcome: Met This Shift     Problem: Tissue Perfusion:  Goal: Peripheral tissue perfusion will improve  Description: Peripheral tissue perfusion will improve  Outcome: Ongoing     Problem: Diarrhea:  Goal: Bowel elimination is within specified parameters  Description: Bowel elimination is within specified parameters  Outcome: Not Met This Shift  Goal: Passage of soft, formed stool  Description: Passage of soft, formed stool  Outcome: Not Met This Shift  Goal: Establishment of normal bowel function will improve to within specified parameters  Description: Establishment of normal bowel function will improve to within specified parameters  Outcome: Not Met This Shift

## 2020-10-15 NOTE — PROGRESS NOTES
Wound / ostomy dept follow-up for wound and ostomy care. The Pt was just medicated for pain. The dressing was soaked off then packed with NSS moistened kerlix. Covered wih ABD. Ileostomy appliance changed using 1 pc Coloplast. No output noted. Family at the bedside and updated. Emotional support given.

## 2020-10-15 NOTE — CARE COORDINATION
family requesting to have pt transferred to [facility] for Hospice . Left message for  for cm [facility] ([phone]) to further discuss process of transfer. Family will to remain with [facility] [place] if feasible; if not, prefer [facility] . Await call back from [facility]. D/t covid restrictions, [place] allowing a greater number of family to stay with pt. [facility], which is in [address] is yellow, juan josé is red (hi alert). [place] allowing less visitors at a time. Will follow. Courtney Grijalva. Addendum; spoke with [facility] by phone; states that a contracted Hospice agency has to evaluate pt for GIP at [place]; then Hospital would have to have the occupancy for the pt; and time there would be limited under GIP (maybe 4-5 days per [address] Family visiting would still be under restriction. I informed the family (daughter Krishna Smith and son Brian Garces and 3 other family members) of the above. We also discussed  home with Hospice would be an option given the fact that it is important to the family that they can be with her during her final days and that there would be no restrictions imposed in the home setting. Explained that Hospice can arrange all needs for them at home. They wished to discuss and think about it. Spoke with Mary Jane from [facility], [facility] re; above. She states that she offered the option of Hospice at home to the family for the above reasons, also. Bebeto Duarte also stated that [facility] is not affiliated with [place] for GIP. will await their decision. Courtney Grijalva. Met with Eloisa Smith at their request. They wish a referral be made to community hospice from [place] with the continued intent to have patient transferred to [place] with Hospice. For now home with Hospice NOT the plan. spoke to Mayo Geller at [facility]; faxed requested information to [phone]. Await call back from [facility] re; referral. Bebeto Duarte from [facility], [facility] updated. Will follow. Courtney Grijalva.     Addendum; received call from Chao Bunn at [facility] hospice; referral received; case discussed; she will contact family. Walker Alvarado.

## 2020-10-15 NOTE — PROGRESS NOTES
ischemic disease. XR CHEST PORTABLE   Final Result   Unchanged airspace disease and pleural effusions. XR CHEST PORTABLE   Final Result   1. Left internal jugular central venous catheter tip is not well seen but   appears to terminate in the lower superior vena cava. No complication   including pneumothorax. 2. Other lines and tubes are stable. 3. Stable cardiopulmonary status. XR CHEST PORTABLE   Final Result   1. Moderate bilateral pleural effusions. 2. Findings of mild vascular congestion. CT HEAD WO CONTRAST   Final Result   Focal hypodensity within the left frontal lobe white matter. Correlate MRI   imaging as subacute infarct cannot be excluded. XR CHEST PORTABLE   Final Result   Supportive devices are in stable positions. Bibasilar effusions and airspace disease/atelectasis. Improved aeration of   the right base in the interval.         XR CHEST PORTABLE   Final Result   Increased right lower lobe infiltrate versus atelectasis and small pleural   effusion on the right. Unchanged left basilar airspace disease and small pleural effusion. US DUP LOWER EXTREMITIES BILATERAL VENOUS   Final Result   No evidence of DVT in either lower extremity. US DUP UPPER EXTREMITY LEFT VENOUS   Final Result   Occlusive thrombus left mid to proximal brachial vein. The findings were sent to the Radiology Results [address] at 11:31   am on 10/9/2020to be communicated to a licensed caregiver. XR CHEST PORTABLE   Final Result   ET tube advancement. Right lower lobe atelectasis. XR CHEST PORTABLE   Final Result   No interval change         RADIOLOGY REPORT   Final Result      XR CHEST PORTABLE   Final Result   1. No interval change in the small left pleural effusion and left lung base   atelectasis. 2. No change in the minimal right lung base atelectasis.          XR CHEST PORTABLE   Final Result   Opacities are present in left lung base which appear to have increased in   could suggest increasing pneumonia, atelectasis, or effusion. XR CHEST ABDOMEN NG PLACEMENT   Final Result   Satisfactory position of nasogastric tube. XR ABDOMEN (KUB) (SINGLE AP VIEW)   Final Result   Transverse, descending, and sigmoid colon mural thickening may reflect edema   and/or inflammation/infection, without significant interval change. Slight prominence of gas throughout the small intestine, without gross   distension, suggestive of paralytic ileus. Persisting left lung base opacity may be atelectasis with pleural effusion. Differential again includes infectious infiltrate         XR ABDOMEN (KUB) (SINGLE AP VIEW)   Final Result   Thickening of the transverse colon wall. Consistent with inflammation   identified on prior CT. US GALLBLADDER RUQ   Final Result   Cholelithiasis with positive sonographic Hopson's sign but no wall thickening   or pericholecystic inflammatory changes, altogether equivocal for   cholecystitis. Consider functional analysis with nuclear medicine HIDA scan. Hepatic steatosis. 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 Suspected small right pleural effusion. CT ABDOMEN PELVIS W IV CONTRAST Additional Contrast? None   Final Result   Addendum 1 of 1   ADDENDUM:   In the title of the examination, disregard the CT abdomen and pelvis. Final      CT ABDOMEN PELVIS W IV CONTRAST Additional Contrast? None   Final Result   Diffuse inflammation of the transverse colon, descending and rectosigmoid   colon with wall thickening and edema with active contrast   extravasation/bleeding. Hemorrhagic diverticulitis or colitis are   considered. There is some edema in the presacral area and tiny amount of air   collection in the rectovesical pouch. Walled-off microperforation is   considered. Distended gallbladder.       Atelectasis/pleural effusion in the left lung base. RECOMMENDATIONS:   The clinical service was notified         XR CHEST PORTABLE   Final Result   Addendum 1 of 1   ADDENDUM:   In the title of the examination, disregard the CT abdomen and pelvis. Final      XR CHEST 1 VIEW   Final Result   Suspect left pleural effusion. No airspace consolidation. Dual-chamber   pacemaker in place. Follow-up PA and lateral radiographs may be helpful in   further evaluation. XR CHEST PORTABLE    (Results Pending)   XR CHEST PORTABLE    (Results Pending)       Assessment:    Active Problems:    Septic shock (HCC)    Hyponatremia    Diarrhea    UTI (urinary tract infection) with pyuria    Anemia    Leukocytosis    Acidosis    Hypotension    Sick sinus syndrome (HCC)    Cardiac pacemaker in situ    C. difficile colitis    QI (acute kidney injury) ([facility] [address]    Thrombocytopenia (HCC)    Anemia associated with acute blood loss  Resolved Problems:    * No resolved hospital problems. *      Plan:  1. Hypotension (septic shock vs hypovolemia) monitor bp  intensivist following. Pressors per intensivist.   2. sepsis(leukocytosis, RR noted to go up to 21 and 33, infection)POA supportive care and tx underlying infection  3. uti possibly due to catheter treatment started as outpt and continued here for now.  ID following. 4. C. Diff colitis, fuminant/toxic megacolon  abx per ID   Surgery following. Jarrod Pulliam following.  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 Peritonitis associated with c. Diff colitis  abx per ID and surgery following  6. UC less likely   bx reviewed and appears to be idiopathic colitis on bx. D/w gi. Pseudomembranous colitis likely and recommended surgical consult with notation of microperforation. Gi following peripherally  7. Hyponatremia improved monitor  8. qi renal following  Fluids per renal. improving  9.  leukocytosis possibly multifactorial monitor closely  10. Atelectasis contributing to leukocytosis  monitor  11.  Anemia likely with acute blood loss component monitor and transfuse prn  12. Acidosis/lactic acidosis monitor  13. Hypoalbuminemia edema likely due to third spacing  14. Pleural effusion monitor low albumin may also be contributing  15. Atrial fib monitor and tx. Cardiology on consult  16. htn stop lisinopril  Monitor bp  17. LUE dvt  Monitor pt closely  Hematology on consult  Argatroban per intensivist/hematology  Argatroban currently stopped  18. Hypokalemia monitor and replace prn  19. Hypophosphatemia monitor and replace prn  20. Hyperkalemia monitor and tx  21. 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 Possible cva monitor  Repeat ct reviewed    Pt terminally extubated yesterday. D/w family present. Continue for treating comfort only.   Transfer to hospice house if approved        Electronically signed by Everlean Fleischer, DO on 10/15/2020 at 3:15 PM

## 2020-10-15 NOTE — CARE COORDINATION
Pt extubated 10/14; transfer from ICU; hospice ; [facility]; iv ms04 gtt. await HOTV to see this am re; possible hospice house. Krista Gresham.

## 2020-10-15 NOTE — PROGRESS NOTES
There is noted. Patient extubated on a morphine drip. Antibiotics were discontinued. ID will sign off.     Elijah Sarmiento

## 2020-10-15 NOTE — CARE COORDINATION
Spoke with [facility] from community hospice; d/t Covid restrictions, plan is for [facility] to meet with family in lobby to discuss options for pt. If required by [facility], [address] conference will by arranged by unit to evaluate pt for Hospice admission. staff aware of potential teleconference. spoke with estelle Cunninghamt director from [employer] who will inform  [facility] of above process. telelinks arranged and ready for use. Will follow. Pinky Monge.

## 2020-10-15 NOTE — PROGRESS NOTES
[facility] CARE AT [facility] Hospitalist   Progress Note    Admitting Date and Time: 10/4/2020  4:26 PM  Admit Dx: Septic shock ([facility] [address] [A41.9, R65.21]  Septic shock ([address] [A41.9, R65.21]    Pt seen and examined earlier today in icu    Subjective:    Patient was admitted with Septic shock ([address] [A41.9, R65.21]  Septic shock ([address] [A41.9, R65.21]. Patient intubated     fentaNYL        LORazepam  1 mg Intravenous Q2H    sodium chloride flush  10 mL Intravenous BID    chlorhexidine  15 mL Mouth/Throat BID     morphine, 2 mg, Q2H PRN    Or  morphine, 4 mg, Q2H PRN  glycopyrrolate, 0.2 mg, Q4H PRN  fentanNYL, 25 mcg, Q4H PRN  artificial tears, , PRN         Objective:            PHYSICAL EXAM:    Vitals:  BP (!) 119/57   Pulse 127   Temp 97.8 °F (36.6 °C) (Axillary)   Resp 13   Ht 5' 6\" (1.676 m)   Wt 236 lb 12.4 oz (107.4 kg)   SpO2 98%   BMI 38.22 kg/m²     General:  Appears comfortable. Pt intubated  HEENT:  Mucous membranes moist. No erythema, rhinorrhea, or post-nasal drip noted. Neck:  No carotid bruits. Heart:  Rhythm regular at rate of 120  Lungs:  CTA. No wheeze, rales, or rhonchi  Abdomen:  Positive bowel sounds positive. Soft. Non-tender. No guarding, rebound or rigidity. Breast/Rectal/Genitourinary: not pertinent. Extremities:  Pos for 1+ b/l lower extremity edema  Skin:  Warm and dry  Vascular: 2/4 Dorsalis Pedis pulses bilaterally.   Neuro:  Limited due to pt's status              Recent Labs     10/12/20  0545 10/12/20  1115 10/13/20  0515 10/14/20  0625     --  140 140   K 5.7* 5.3* 5.2* 4.4   *  --  107 103   CO2 28  --  30* 31*   BUN 29*  --  27* 32*   CREATININE 0.5  --  0.6 0.7   GLUCOSE 131*  --  137* 127*   CALCIUM 7.5*  --  8.0* 8.3*       Recent Labs     10/12/20  0545 10/13/20  0515 10/13/20  1630 10/14/20  0625   WBC 17.7* 11.4  --  6.9   RBC 2.56* 2.03*  --  2.34*   HGB 7.6* 6.0* 7.3* 6.9*   HCT 24.0* 19.5* 23.4* 22.1*   MCV 93.8 96.1  --  94.4   MCH 29.7 29.6  -- 29.5   MCHC 31.7* 30.8*  --  31.2*   RDW 19.9* 19.6*  --  18.5*   PLT 29* 29*  --  28*   MPV 10.9 10.9  --  12.3*       CBC with Differential:    Lab Results   Component Value Date    WBC 6.9 10/14/2020    RBC 2.34 10/14/2020    HGB 6.9 10/14/2020    HCT 22.1 10/14/2020    PLT 28 10/14/2020    MCV 94.4 10/14/2020    MCH 29.5 10/14/2020    MCHC 31.2 10/14/2020    RDW 18.5 10/14/2020    NRBC 7.9 10/14/2020    METASPCT 2.0 10/13/2020    LYMPHOPCT 14.9 10/14/2020    PROMYELOPCT 1.0 10/10/2020    MONOPCT 7.0 10/14/2020    MYELOPCT 4.0 10/10/2020    BASOPCT 0.0 10/14/2020    MONOSABS 0.48 10/14/2020    LYMPHSABS 1.04 10/14/2020    EOSABS 0.00 10/14/2020    BASOSABS 0.00 10/14/2020     CMP:    Lab Results   Component Value Date     10/14/2020    K 4.4 10/14/2020    K 3.8 10/09/2020     10/14/2020    CO2 31 10/14/2020    BUN 32 10/14/2020    CREATININE 0.7 10/14/2020    GFRAA >60 10/14/2020    LABGLOM >60 10/14/2020    GLUCOSE 127 10/14/2020    PROT 5.2 10/14/2020    LABALBU 3.2 10/14/2020    CALCIUM 8.3 10/14/2020    BILITOT 2.8 10/14/2020    ALKPHOS 284 10/14/2020    AST 57 10/14/2020    ALT 18 10/14/2020     Ionized Calcium:  No results found for: IONCA  Magnesium:    Lab Results   Component Value Date    MG 2.0 10/14/2020     Phosphorus:    Lab Results   Component Value Date    PHOS 2.5 10/14/2020     PT/INR:    Lab Results   Component Value Date    PROTIME 33.4 10/14/2020    INR 2.8 10/14/2020     PTT:    Lab Results   Component Value Date    APTT 68.6 10/14/2020   [APTT}     Radiology:   XR CHEST PORTABLE   Final Result   Infiltrate and/or atelectasis in the left lower lung zone with associated   pleural effusion. Probably a small right pleural effusion. CT HEAD WO CONTRAST   Final Result   1. No acute intracranial abnormality. 2. Diffuse cerebral atrophy with chronic small vessel ischemic disease. XR CHEST PORTABLE   Final Result   Unchanged airspace disease and pleural effusions. XR CHEST PORTABLE   Final Result   1. Left internal jugular central venous catheter tip is not well seen but   appears to terminate in the lower superior vena cava. No complication   including pneumothorax. 2. Other lines and tubes are stable. 3. Stable cardiopulmonary status. XR CHEST PORTABLE   Final Result   1. Moderate bilateral pleural effusions. 2. Findings of mild vascular congestion. CT HEAD WO CONTRAST   Final Result   Focal hypodensity within the left frontal lobe white matter. Correlate MRI   imaging as subacute infarct cannot be excluded. XR CHEST PORTABLE   Final Result   Supportive devices are in stable positions. Bibasilar effusions and airspace disease/atelectasis. Improved aeration of   the right base in the interval.         XR CHEST PORTABLE   Final Result   Increased right lower lobe infiltrate versus atelectasis and small pleural   effusion on the right. Unchanged left basilar airspace disease and small pleural effusion. US DUP LOWER EXTREMITIES BILATERAL VENOUS   Final Result   No evidence of DVT in either lower extremity. US DUP UPPER EXTREMITY LEFT VENOUS   Final Result   Occlusive thrombus left mid to proximal brachial vein. The findings were sent to the Radiology Results [id number] at 11:31   am on 10/9/2020to be communicated to a licensed caregiver. XR CHEST PORTABLE   Final Result   ET tube advancement. Right lower lobe atelectasis. XR CHEST PORTABLE   Final Result   No interval change         RADIOLOGY REPORT   Final Result      XR CHEST PORTABLE   Final Result   1. No interval change in the small left pleural effusion and left lung base   atelectasis. 2. No change in the minimal right lung base atelectasis. XR CHEST PORTABLE   Final Result   Opacities are present in left lung base which appear to have increased in   could suggest increasing pneumonia, atelectasis, or effusion. XR CHEST ABDOMEN NG PLACEMENT   Final Result   Satisfactory position of nasogastric tube. XR ABDOMEN (KUB) (SINGLE AP VIEW)   Final Result   Transverse, descending, and sigmoid colon mural thickening may reflect edema   and/or inflammation/infection, without significant interval change. Slight prominence of gas throughout the small intestine, without gross   distension, suggestive of paralytic ileus. Persisting left lung base opacity may be atelectasis with pleural effusion. Differential again includes infectious infiltrate         XR ABDOMEN (KUB) (SINGLE AP VIEW)   Final Result   Thickening of the transverse colon wall. Consistent with inflammation   identified on prior CT. US GALLBLADDER RUQ   Final Result   Cholelithiasis with positive sonographic Hopson's sign but no wall thickening   or pericholecystic inflammatory changes, altogether equivocal for   cholecystitis. Consider functional analysis with nuclear medicine HIDA scan. Hepatic steatosis. 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 Suspected small right pleural effusion. CT ABDOMEN PELVIS W IV CONTRAST Additional Contrast? None   Final Result   Addendum 1 of 1   ADDENDUM:   In the title of the examination, disregard the CT abdomen and pelvis. Final      CT ABDOMEN PELVIS W IV CONTRAST Additional Contrast? None   Final Result   Diffuse inflammation of the transverse colon, descending and rectosigmoid   colon with wall thickening and edema with active contrast   extravasation/bleeding. Hemorrhagic diverticulitis or colitis are   considered. There is some edema in the presacral area and tiny amount of air   collection in the rectovesical pouch. Walled-off microperforation is   considered. Distended gallbladder. Atelectasis/pleural effusion in the left lung base.       RECOMMENDATIONS:   The clinical service was notified         XR CHEST PORTABLE   Final Result Addendum 1 of 1   ADDENDUM:   In the title of the examination, disregard the CT abdomen and pelvis. Final      XR CHEST 1 VIEW   Final Result   Suspect left pleural effusion. No airspace consolidation. Dual-chamber   pacemaker in place. Follow-up PA and lateral radiographs may be helpful in   further evaluation. XR CHEST PORTABLE    (Results Pending)       Assessment:    Active Problems:    Septic shock (HCC)    Hyponatremia    Diarrhea    UTI (urinary tract infection) with pyuria    Anemia    Leukocytosis    Acidosis    Hypotension    Sick sinus syndrome (HCC)    Cardiac pacemaker in situ    C. difficile colitis    QI (acute kidney injury) ([facility] [address]    Thrombocytopenia (HCC)    Anemia associated with acute blood loss  Resolved Problems:    * No resolved hospital problems. *      Plan:  1. Hypotension (septic shock vs hypovolemia) monitor bp  intensivist following. Pressors per intensivist.   2. sepsis(leukocytosis, RR noted to go up to 21 and 33, infection)POA supportive care and tx underlying infection  3. uti possibly due to catheter treatment started as outpt and continued here for now.  ID following. 4. C. Diff colitis, fuminant/toxic megacolon  abx per ID   Surgery following. Terrance Seals following.  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 Peritonitis associated with c. Diff colitis  abx per ID and surgery following  6. UC less likely   bx reviewed and appears to be idiopathic colitis on bx. D/w gi. Pseudomembranous colitis likely and recommended surgical consult with notation of microperforation. Gi following peripherally  7. Hyponatremia improved monitor  8. qi renal following  Fluids per renal. improving  9.  leukocytosis possibly multifactorial monitor closely  10. Atelectasis contributing to leukocytosis  monitor  11. Anemia likely with acute blood loss component monitor and transfuse prn  12. Acidosis/lactic acidosis monitor  13.  Hypoalbuminemia edema likely due to third spacing  14. Pleural effusion monitor low albumin may also be contributing  15. Atrial fib monitor and tx. Cardiology on consult  16. htn stop lisinopril  Monitor bp  17. LUE dvt  Monitor pt closely  Hematology on consult  Argatroban per intensivist/hematology  Argatroban currently stopped  18. Hypokalemia monitor and replace prn  19. Hypophosphatemia monitor and replace prn  20. Hyperkalemia monitor and tx  21. 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  Possible cva monitor  Repeat ct reviewed    Pt to be terminally extubated  Pt to be made comfortable        Chart reviewed and updated by nursing    Time spent is 35 min    Electronically signed by Asael Alberto DO on 10/14/2020 at 11:34 PM

## 2020-10-15 NOTE — PROGRESS NOTES
Nutrition Assessment     Type and Reason for Visit: Reassess    Nutrition Assessment:  Pt to be seen for follow-up assessment. Chart reviewed. Unfortunately, pt s/p terminal extubation and w/ noted plan for hospice at this time. Will sign-off. Please consult if RD needed.     Electronically signed by Paramjit Ramires RD, LD on 10/15/20 at 3:10 PM EDT    Contact: ext [phone]

## 2020-10-16 NOTE — DISCHARGE SUMMARY
[facility]ERGENCY SERVICE Physician Discharge Summary       [address]  [phone]          Activity level: as sy    Diet: No diet orders on file    Dispo:hospice at [facility] at discharge: guarded          Patient ID:  Alexandre Holley  [id number]  82 y.o.  1935    Admit date: 10/4/2020    Discharge date and time:  10/16/2020  8:09 AM    Admission Diagnoses: Active Problems:    Septic shock (HCC)    Hyponatremia    Diarrhea    UTI (urinary tract infection) with pyuria    Anemia    Leukocytosis    Acidosis    Hypotension    Sick sinus syndrome (HCC)    Cardiac pacemaker in situ    C. difficile colitis    QI (acute kidney injury) ([address]    Thrombocytopenia (HCC)    Anemia associated with acute blood loss  Resolved Problems:    * No resolved hospital problems. *      Discharge Diagnoses: Active Problems:    Septic shock (HCC)    Hyponatremia    Diarrhea    UTI (urinary tract infection) with pyuria    Anemia    Leukocytosis    Acidosis    Hypotension    Sick sinus syndrome (HCC)    Cardiac pacemaker in situ    C. difficile colitis    QI (acute kidney injury) ([address]    Thrombocytopenia (HCC)    Anemia associated with acute blood loss  Resolved Problems:    * No resolved hospital problems. *    Hypotension(septic shock vs hypovolemia)  Sepsis  uti possibly due to catheter   C. Diff colitis  Peritonitis associated with c.  Diff colitis  Idiopathic colitis on bx and less likely UC  Hyponatremia  qi  Leukocytosis  Atelectasis  Anemia with acute blood loss component  Acidosis/lactic acidosis  Hypoalbuminemia  Pleural effusion  Atrial fib  htn  LUE dvt  Hypokalemia  Hypophosphatemia  Thrombocytopenia  Possible cva      Consults:  IP CONSULT TO CRITICAL CARE  IP CONSULT TO NEPHROLOGY  IP CONSULT TO INFECTIOUS DISEASES  IP CONSULT TO GI  IP CONSULT TO GENERAL SURGERY  IP CONSULT TO IV TEAM  IP CONSULT TO CRITICAL CARE  IP CONSULT TO soft, non-tender, non-distended, normal bowel sounds, no masses or organomegaly  Extremities: no cyanosis, no clubbing and pos for 1+ b/l le edema  I/O last 3 completed shifts: In: 6472 [I.V.:3020; Blood:350]  Out: 6976 [CJIPV:8953; Emesis/NG output:50; Drains:1620; Stool:25]  I/O this shift:  In: -   Out: 7385 [Urine:1950; Drains:480]      LABS:  Recent Labs     10/14/20  0625      K 4.4      CO2 31*   BUN 32*   CREATININE 0.7   GLUCOSE 127*   CALCIUM 8.3*       Recent Labs     10/13/20  1630 10/14/20  0625   WBC  --  6.9   RBC  --  2.34*   HGB 7.3* 6.9*   HCT 23.4* 22.1*   MCV  --  94.4   MCH  --  29.5   MCHC  --  31.2*   RDW  --  18.5*   PLT  --  28*   MPV  --  12.3*       No results for input(s): POCGLU in the last 72 hours. Imaging:   XR CHEST PORTABLE   Final Result   Infiltrate and/or atelectasis in the left lower lung zone with associated   pleural effusion. Probably a small right pleural effusion. CT HEAD WO CONTRAST   Final Result   1. No acute intracranial abnormality. 2. Diffuse cerebral atrophy with chronic small vessel ischemic disease. XR CHEST PORTABLE   Final Result   Unchanged airspace disease and pleural effusions. XR CHEST PORTABLE   Final Result   1. Left internal jugular central venous catheter tip is not well seen but   appears to terminate in the lower superior vena cava. No complication   including pneumothorax. 2. Other lines and tubes are stable. 3. Stable cardiopulmonary status. XR CHEST PORTABLE   Final Result   1. Moderate bilateral pleural effusions. 2. Findings of mild vascular congestion. CT HEAD WO CONTRAST   Final Result   Focal hypodensity within the left frontal lobe white matter. Correlate MRI   imaging as subacute infarct cannot be excluded. XR CHEST PORTABLE   Final Result   Supportive devices are in stable positions. Bibasilar effusions and airspace disease/atelectasis.   Improved aeration of the right base in the interval.         XR CHEST PORTABLE   Final Result   Increased right lower lobe infiltrate versus atelectasis and small pleural   effusion on the right. Unchanged left basilar airspace disease and small pleural effusion. US DUP LOWER EXTREMITIES BILATERAL VENOUS   Final Result   No evidence of DVT in either lower extremity. US DUP UPPER EXTREMITY LEFT VENOUS   Final Result   Occlusive thrombus left mid to proximal brachial vein. The findings were sent to the Radiology Results [address] at 11:31   am on 10/9/2020to be communicated to a licensed caregiver. XR CHEST PORTABLE   Final Result   ET tube advancement. Right lower lobe atelectasis. XR CHEST PORTABLE   Final Result   No interval change         RADIOLOGY REPORT   Final Result      XR CHEST PORTABLE   Final Result   1. No interval change in the small left pleural effusion and left lung base   atelectasis. 2. No change in the minimal right lung base atelectasis. XR CHEST PORTABLE   Final Result   Opacities are present in left lung base which appear to have increased in   could suggest increasing pneumonia, atelectasis, or effusion. XR CHEST ABDOMEN NG PLACEMENT   Final Result   Satisfactory position of nasogastric tube. XR ABDOMEN (KUB) (SINGLE AP VIEW)   Final Result   Transverse, descending, and sigmoid colon mural thickening may reflect edema   and/or inflammation/infection, without significant interval change. Slight prominence of gas throughout the small intestine, without gross   distension, suggestive of paralytic ileus. Persisting left lung base opacity may be atelectasis with pleural effusion. Differential again includes infectious infiltrate         XR ABDOMEN (KUB) (SINGLE AP VIEW)   Final Result   Thickening of the transverse colon wall. Consistent with inflammation   identified on prior CT.          US GALLBLADDER RUQ   Final Result Cholelithiasis with positive sonographic Hopson's sign but no wall thickening   or pericholecystic inflammatory changes, altogether equivocal for   cholecystitis. Consider functional analysis with nuclear medicine HIDA scan. Hepatic steatosis. 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 Suspected small right pleural effusion. CT ABDOMEN PELVIS W IV CONTRAST Additional Contrast? None   Final Result   Addendum 1 of 1   ADDENDUM:   In the title of the examination, disregard the CT abdomen and pelvis. Final      CT ABDOMEN PELVIS W IV CONTRAST Additional Contrast? None   Final Result   Diffuse inflammation of the transverse colon, descending and rectosigmoid   colon with wall thickening and edema with active contrast   extravasation/bleeding. Hemorrhagic diverticulitis or colitis are   considered. There is some edema in the presacral area and tiny amount of air   collection in the rectovesical pouch. Walled-off microperforation is   considered. Distended gallbladder. Atelectasis/pleural effusion in the left lung base. RECOMMENDATIONS:   The clinical service was notified         XR CHEST PORTABLE   Final Result   Addendum 1 of 1   ADDENDUM:   In the title of the examination, disregard the CT abdomen and pelvis. Final      XR CHEST 1 VIEW   Final Result   Suspect left pleural effusion. No airspace consolidation. Dual-chamber   pacemaker in place. Follow-up PA and lateral radiographs may be helpful in   further evaluation.              Patient Instructions:      Medication List      ASK your doctor about these medications    acetaminophen 325 MG tablet  Commonly known as:  TYLENOL     amitriptyline 10 MG tablet  Commonly known as:  ELAVIL     calcium carbonate 600 MG Tabs tablet     cefTRIAXone 1 g injection  Commonly known as:  ROCEPHIN     dicyclomine 10 MG capsule  Commonly known as:  BENTYL     ferrous sulfate 325 (65 Fe) MG tablet  Commonly

## 2020-10-17 LAB
BLOOD BANK DISPENSE STATUS: NORMAL
BLOOD BANK DISPENSE STATUS: NORMAL
BLOOD BANK PRODUCT CODE: NORMAL
BLOOD BANK PRODUCT CODE: NORMAL
BPU ID: NORMAL
BPU ID: NORMAL
DESCRIPTION BLOOD BANK: NORMAL
DESCRIPTION BLOOD BANK: NORMAL

## 2020-11-09 LAB
FUNGUS (MYCOLOGY) CULTURE: ABNORMAL
FUNGUS STAIN: ABNORMAL
ORGANISM: ABNORMAL

## 2020-11-24 LAB
AFB CULTURE (MYCOBACTERIA): NORMAL
AFB SMEAR: NORMAL

## 2024-03-07 NOTE — CARE COORDINATION
Social Work / Discharge Planning : Patient was admitted from [facility]. SW placed call to [facility] to verify bedhold. SW spoke to0 patient daughter Romario Farnsowrth and she verified goal at discharge is to return to [place]. [address], Ambulance forms completed. Pre-cert will be needed . Negative COVID will be needed. SW to follow.  Electronically signed by USMAN Cruz on 10/5/20 at 2:26 PM EDT due

## (undated) DEVICE — MARKER,SKIN,WI/RULER AND LABELS: Brand: MEDLINE

## (undated) DEVICE — GOWN,SIRUS,FABRNF,L,20/CS: Brand: MEDLINE

## (undated) DEVICE — DOUBLE BASIN SET: Brand: MEDLINE INDUSTRIES, INC.

## (undated) DEVICE — SUTURE VCRL + SZ 3-0 L18IN ABSRB UD PS-1 L24MM 3/8 CIR PRIM VCP683G

## (undated) DEVICE — CATHETER ETER SUCT 14FR RED POLYPR STR OPN W VLV

## (undated) DEVICE — PACK PROCEDURE SURG GEN CUST

## (undated) DEVICE — RELOAD STPL L75MM OPN H3.8MM CLS 1.5MM WIRE DIA0.2MM REG

## (undated) DEVICE — SET INSTRUMENT LAP I

## (undated) DEVICE — TOWEL,OR,DSP,ST,BLUE,STD,6/PK,12PK/CS: Brand: MEDLINE

## (undated) DEVICE — SUTURE SILK BLK BR 3-0 18IN SH (24/BX C0135

## (undated) DEVICE — ELECTRODE PT RET AD L9FT HI MOIST COND ADH HYDRGEL CORDED

## (undated) DEVICE — Device

## (undated) DEVICE — DRESSING NEG PRSS OPN ABD FEN VIS PROTCT LAYR PERF FOAM DRP 3706055S] KCI THERAPEUTIC SERVICES]

## (undated) DEVICE — DRESSING NEG PRSS L 26X15X3.2CM W/ 1 PD W/ CONN 2 SHT STD

## (undated) DEVICE — SOLUTION IV IRRIG POUR BRL 0.9% SODIUM CHL 2F7124

## (undated) DEVICE — Z DISCONTINUED USE 2716239 STAPLER INT STPL 51MM CUT LN L40MM STD TISS CRV CUT CR40B

## (undated) DEVICE — DRAIN SURG 19FR 100% SIL RADPQ RND CHN FULL FLUT

## (undated) DEVICE — YANKAUER,OPEN TIP,W/O VENT,STERILE: Brand: MEDLINE INDUSTRIES, INC.

## (undated) DEVICE — SPONGE LAP W18XL18IN WHT COT 4 PLY FLD STRUNG RADPQ DISP ST

## (undated) DEVICE — SET INSTRUMENT LAP II

## (undated) DEVICE — STAPLER INT L75MM CUT LN L73MM STPL LN L77MM BLU B FRM 8

## (undated) DEVICE — 4-PORT MANIFOLD: Brand: NEPTUNE 2

## (undated) DEVICE — GLOVE ORANGE PI 7 1/2   MSG9075

## (undated) DEVICE — SHEET, T, LAPAROTOMY, STERILE: Brand: MEDLINE

## (undated) DEVICE — FORCEPS BONNEY

## (undated) DEVICE — BLADE ES L6IN ELASTOMERIC COAT EXT DURABLE BEND UPTO 90DEG

## (undated) DEVICE — CANISTER NEG PRSS 1000ML W/ GEL INFOVAC

## (undated) DEVICE — INTENDED FOR TISSUE SEPARATION, AND OTHER PROCEDURES THAT REQUIRE A SHARP SURGICAL BLADE TO PUNCTURE OR CUT.: Brand: BARD-PARKER ® STAINLESS STEEL BLADES

## (undated) DEVICE — BAG: SPONGE CT 10.25X32 2.0ML BLU 250/CS: Brand: MEDICAL ACTION INDUSTRIES

## (undated) DEVICE — SEALER ENDOSCP NANO COAT OPN DIV CRV L JAW LIGASURE IMPACT

## (undated) DEVICE — SYRINGE IRRIG 60ML SFT PLIABLE BLB EZ TO GRP 1 HND USE W/

## (undated) DEVICE — COVER HNDL LT DISP

## (undated) DEVICE — GOWN,SIRUS,FABRNF,XL,20/CS: Brand: MEDLINE

## (undated) DEVICE — SOLUTION IV IRRIG WATER 1000ML POUR BRL 2F7114

## (undated) DEVICE — GLOVE SURG SZ 7 L12IN FNGR THK94MIL TRNSLUC YEL LTX HYDRGEL

## (undated) DEVICE — WIPE,PROTECTANT,SKIN,SUREPREP: Brand: MEDLINE

## (undated) DEVICE — DRESSING FOAM W22XL25CM FILVE LAYR FOAM DP DEF SAFETAC

## (undated) DEVICE — YANKAUER,POOLE TIP,STERILE,50/CS: Brand: MEDLINE

## (undated) DEVICE — CHLORAPREP 26ML ORANGE

## (undated) DEVICE — TRAP SPEC MUCUS FOR SUCT

## (undated) DEVICE — SPONGE,DRAIN,NONWVN,4"X4",6PLY,STRL,LF: Brand: MEDLINE

## (undated) DEVICE — CANISTER NEG PRSS L 100ML GRAD OPN ABD THER ABTHERA

## (undated) DEVICE — Device: Brand: SENSURA MIO

## (undated) DEVICE — TUBING, SUCTION, 3/16" X 12', STRAIGHT: Brand: MEDLINE